# Patient Record
Sex: MALE | Race: WHITE | NOT HISPANIC OR LATINO | Employment: OTHER | URBAN - NONMETROPOLITAN AREA
[De-identification: names, ages, dates, MRNs, and addresses within clinical notes are randomized per-mention and may not be internally consistent; named-entity substitution may affect disease eponyms.]

---

## 2017-07-25 ENCOUNTER — OFFICE VISIT (OUTPATIENT)
Dept: CARDIOLOGY | Facility: CLINIC | Age: 62
End: 2017-07-25

## 2017-07-25 VITALS
SYSTOLIC BLOOD PRESSURE: 114 MMHG | BODY MASS INDEX: 33.89 KG/M2 | HEART RATE: 80 BPM | DIASTOLIC BLOOD PRESSURE: 74 MMHG | WEIGHT: 250.2 LBS | HEIGHT: 72 IN | OXYGEN SATURATION: 94 %

## 2017-07-25 DIAGNOSIS — I10 ESSENTIAL HYPERTENSION: ICD-10-CM

## 2017-07-25 DIAGNOSIS — I25.10 CORONARY ARTERY DISEASE INVOLVING NATIVE CORONARY ARTERY OF NATIVE HEART WITHOUT ANGINA PECTORIS: Primary | ICD-10-CM

## 2017-07-25 DIAGNOSIS — Z72.0 TOBACCO USE: ICD-10-CM

## 2017-07-25 PROCEDURE — 99213 OFFICE O/P EST LOW 20 MIN: CPT | Performed by: INTERNAL MEDICINE

## 2017-07-25 NOTE — PROGRESS NOTES
subjective     Chief Complaint   Patient presents with   • Follow-up   • Coronary Artery Disease   • Hypertension   • Hyperlipidemia     History of Present Illness  Patient has not been seen in quite a while.  Because of some family emergency at 2 minutes appointments.  Patient states that he is doing very well.  He denies any chest pain palpitations or shortness of breath.    Coronary artery disease  Patient had inferior wall myocardial infarction in 2013 requiring bare metal stent to RCA followed by 2 drug-eluting stents in the LAD and balloon angioplasty of the medial trunk of the LAD diagonal in 2014.  Patient is currently doing very well he denies any chest pain palpitations or shortness of breath is fairly active and is doing well from cardiac standpoint.  Recently he was planning to have surgery on the ganglion on cyst on his wrist and was wanting to know if he could hold off Plavix and aspirin for the surgery.  However he apparently had a TIA 3 weeks ago went to the emergency room and was told that he has had a mini stroke.  Patient is planning to see a neurologist to see his opinion if he could stop the medications.     HYPERTENSION  Jovanni Richter has long-standing essential hypertension for years.  he is taking medications regularly.  There are no medication side effects.  Blood pressure is very well controlled.  There has been no headache nausea chest pain.  There has been no syncopal or presyncopal episode.  he denies episodes of hypo-tension or accelerated hypertension.     Hyperlipidemia  Patient is taking Lipitor 40 mg daily.  Tolerating medications very well.  His lab work with Dr. Espinal apparently have been normal.  He has no side effects.     Unfortunately he still smokes with having known coronary artery disease and having recent TIA.  Patient is also diabetic.  Smoking cessation was discussed  Past Surgical History:   Procedure Laterality Date   • CARDIAC CATHETERIZATION  03/03/2014   •  CARDIOVASCULAR STRESS TEST  04/04/2013   • CATARACT EXTRACTION     • CEREBRAL ANEURYSM REPAIR     • CORONARY ANGIOPLASTY WITH STENT PLACEMENT     • ECHO - CONVERTED  02/02/2014   • INNER EAR SURGERY       Family History   Problem Relation Age of Onset   • Osteoporosis Mother    • Cancer Mother      breast and lung   • Cancer Father      small cell cancer, kidney   • Heart disease Father    • Diabetes Father    • No Known Problems Sister    • Aneurysm Brother      Past Medical History:   Diagnosis Date   • Claustrophobia    • COPD (chronic obstructive pulmonary disease)    • Diabetes    • Fracture, finger    • High cholesterol    • Hypertension    • MI (myocardial infarction)    • Prostate disease    • Stroke     X3 last one 9/2016   • TIA (transient ischemic attack)      Patient Active Problem List   Diagnosis   • Left wrist pain   • Diabetes   • Prostate disease   • COPD (chronic obstructive pulmonary disease)   • History of TIA (transient ischemic attack)   • Ganglion cyst of wrist   • Benign fibroma of prostate   • Claustrophobia   • Blood in the urine   • Incomplete bladder emptying   • Coronary artery disease, inferior wall myocardial infarction 2013, bare metal stent to RCA, drug-eluting stent ×2 to LAD and balloon angioplasty of the medial trunk of the LAD diagonal 2014   • Hypertension   • Hyperlipidemia   • Tobacco use       Social History   Substance Use Topics   • Smoking status: Current Every Day Smoker     Packs/day: 1.00   • Smokeless tobacco: Never Used   • Alcohol use No       No Known Allergies    Current Outpatient Prescriptions on File Prior to Visit   Medication Sig   • atenolol (TENORMIN) 25 MG tablet Take 25 mg by mouth daily.   • atorvastatin (LIPITOR) 40 MG tablet Take 40 mg by mouth daily.   • clopidogrel (PLAVIX) 75 MG tablet Take 75 mg by mouth daily.   • doxazosin (CARDURA) 8 MG tablet Take 8 mg by mouth every night.   • gabapentin (NEURONTIN) 400 MG capsule Take 400 mg by mouth 2 (two)  "times a day.   • glimepiride (AMARYL) 4 MG tablet Take 4 mg by mouth daily.   • isosorbide mononitrate (IMDUR) 30 MG 24 hr tablet Take 30 mg by mouth daily.   • losartan (COZAAR) 100 MG tablet Take 1 tablet by mouth daily.   • metFORMIN (GLUCOPHAGE) 500 MG tablet Take 500 mg by mouth. 500 mg am  1000 mg qhs   • pantoprazole (PROTONIX) 40 MG EC tablet Take 40 mg by mouth daily.   • Venlafaxine HCl (EFFEXOR PO) Take  by mouth.   • zolpidem (AMBIEN) 10 MG tablet Take 10 mg by mouth at night as needed.     No current facility-administered medications on file prior to visit.          The following portions of the patient's history were reviewed and updated as appropriate: allergies, current medications, past family history, past medical history, past social history, past surgical history and problem list.    Review of Systems   Constitution: Negative.   HENT: Negative.  Negative for congestion and headaches.    Eyes: Negative.    Cardiovascular: Negative.  Negative for chest pain, cyanosis, dyspnea on exertion, irregular heartbeat, leg swelling, near-syncope, orthopnea, palpitations, paroxysmal nocturnal dyspnea and syncope.   Respiratory: Negative.  Negative for shortness of breath.    Hematologic/Lymphatic: Negative.    Musculoskeletal: Negative.    Gastrointestinal: Negative.    Neurological: Negative.           Objective:     /74 (BP Location: Left arm, Patient Position: Sitting)  Pulse 80  Ht 72\" (182.9 cm)  Wt 250 lb 3.2 oz (113 kg)  SpO2 94%  BMI 33.93 kg/m2  Physical Exam   Constitutional: He appears well-developed and well-nourished.   HENT:   Head: Normocephalic and atraumatic.   Mouth/Throat: Oropharynx is clear and moist.   Eyes: Conjunctivae and EOM are normal. Pupils are equal, round, and reactive to light. No scleral icterus.   Neck: Normal range of motion. Neck supple. No JVD present. No tracheal deviation present. No thyromegaly present.   Cardiovascular: Normal rate, regular rhythm, normal " heart sounds and intact distal pulses.  Exam reveals no friction rub.    No murmur heard.  Pulmonary/Chest: Effort normal and breath sounds normal. No respiratory distress. He has no wheezes. He has no rales. He exhibits no tenderness.   Abdominal: Soft. Bowel sounds are normal. He exhibits no distension and no mass. There is no tenderness. There is no rebound and no guarding.   Musculoskeletal: Normal range of motion. He exhibits no edema, tenderness or deformity.   Lymphadenopathy:     He has no cervical adenopathy.   Neurological: He is alert. He has normal reflexes. No cranial nerve deficit. He exhibits normal muscle tone. Coordination normal.   Skin: Skin is warm and dry.   Psychiatric: He has a normal mood and affect. His behavior is normal. Judgment and thought content normal.         Lab Review  No lab report available today but he had lab work done by Dr. Espinal today.  We will get a copy for our records    Procedures       I personally viewed and interpreted the patient's LAB data         Assessment:     1. Coronary artery disease involving native coronary artery of native heart without angina pectoris    2. Essential hypertension    3. Tobacco use          Plan:      Patient is doing very well from cardiac standpoint he has had myocardial infarction in 2013 and has had the multiple stents.  Patient is quite active and is totally asymptomatic.  Current management will be continued.  Aggressive risk factor modification discussed.    Blood pressure seems been very well controlled  Unfortunately patient continues to smoke.  Cessation of smoking was again urged.    He also has diabetes mellitus and hyperlipidemia he is following very closely with Dr. Espinal.  He had lab work done today reported is not available.    Follow-up scheduled.  No change in therapy at this time.            Return in about 6 months (around 1/25/2018).

## 2017-11-13 ENCOUNTER — APPOINTMENT (OUTPATIENT)
Dept: CT IMAGING | Facility: HOSPITAL | Age: 62
End: 2017-11-13

## 2017-11-13 ENCOUNTER — OFFICE VISIT (OUTPATIENT)
Dept: UROLOGY | Facility: CLINIC | Age: 62
End: 2017-11-13

## 2017-11-13 ENCOUNTER — HOSPITAL ENCOUNTER (INPATIENT)
Facility: HOSPITAL | Age: 62
LOS: 2 days | Discharge: HOME | End: 2017-11-16
Attending: FAMILY MEDICINE | Admitting: HOSPITALIST

## 2017-11-13 DIAGNOSIS — R33.9 URINARY RETENTION: ICD-10-CM

## 2017-11-13 DIAGNOSIS — N40.1 BENIGN PROSTATIC HYPERPLASIA (BPH) WITH STRAINING ON URINATION: ICD-10-CM

## 2017-11-13 DIAGNOSIS — E11.9 TYPE 2 DIABETES MELLITUS TREATED WITHOUT INSULIN (HCC): ICD-10-CM

## 2017-11-13 DIAGNOSIS — R31.0 GROSS HEMATURIA: Primary | ICD-10-CM

## 2017-11-13 DIAGNOSIS — R39.16 BENIGN PROSTATIC HYPERPLASIA (BPH) WITH STRAINING ON URINATION: ICD-10-CM

## 2017-11-13 LAB
ALBUMIN SERPL-MCNC: 4.6 G/DL (ref 3.4–4.8)
ALBUMIN/GLOB SERPL: 1.4 G/DL (ref 1.5–2.5)
ALP SERPL-CCNC: 89 U/L (ref 40–129)
ALT SERPL W P-5'-P-CCNC: 30 U/L (ref 10–44)
ANION GAP SERPL CALCULATED.3IONS-SCNC: 12.6 MMOL/L (ref 3.6–11.2)
APTT PPP: 26.1 SECONDS (ref 23.8–36.1)
AST SERPL-CCNC: 30 U/L (ref 10–34)
BASOPHILS # BLD AUTO: 0.04 10*3/MM3 (ref 0–0.3)
BASOPHILS NFR BLD AUTO: 0.2 % (ref 0–2)
BILIRUB SERPL-MCNC: 0.4 MG/DL (ref 0.2–1.8)
BUN BLD-MCNC: 13 MG/DL (ref 7–21)
BUN/CREAT SERPL: 10 (ref 7–25)
CALCIUM SPEC-SCNC: 9.5 MG/DL (ref 7.7–10)
CHLORIDE SERPL-SCNC: 102 MMOL/L (ref 99–112)
CO2 SERPL-SCNC: 19.4 MMOL/L (ref 24.3–31.9)
CREAT BLD-MCNC: 1.3 MG/DL (ref 0.43–1.29)
D-LACTATE SERPL-SCNC: 5.2 MMOL/L (ref 0.5–2)
DEPRECATED RDW RBC AUTO: 50.3 FL (ref 37–54)
EOSINOPHIL # BLD AUTO: 0.02 10*3/MM3 (ref 0–0.7)
EOSINOPHIL NFR BLD AUTO: 0.1 % (ref 0–5)
ERYTHROCYTE [DISTWIDTH] IN BLOOD BY AUTOMATED COUNT: 15.6 % (ref 11.5–14.5)
GFR SERPL CREATININE-BSD FRML MDRD: 56 ML/MIN/1.73
GLOBULIN UR ELPH-MCNC: 3.4 GM/DL
GLUCOSE BLD-MCNC: 408 MG/DL (ref 70–110)
HCT VFR BLD AUTO: 38.6 % (ref 42–52)
HGB BLD-MCNC: 13 G/DL (ref 14–18)
IMM GRANULOCYTES # BLD: 0.1 10*3/MM3 (ref 0–0.03)
IMM GRANULOCYTES NFR BLD: 0.4 % (ref 0–0.5)
INR PPP: 1.02 (ref 0.9–1.1)
LYMPHOCYTES # BLD AUTO: 3.01 10*3/MM3 (ref 1–3)
LYMPHOCYTES NFR BLD AUTO: 12.3 % (ref 21–51)
MCH RBC QN AUTO: 30.1 PG (ref 27–33)
MCHC RBC AUTO-ENTMCNC: 33.7 G/DL (ref 33–37)
MCV RBC AUTO: 89.4 FL (ref 80–94)
MONOCYTES # BLD AUTO: 1.85 10*3/MM3 (ref 0.1–0.9)
MONOCYTES NFR BLD AUTO: 7.6 % (ref 0–10)
NEUTROPHILS # BLD AUTO: 19.44 10*3/MM3 (ref 1.4–6.5)
NEUTROPHILS NFR BLD AUTO: 79.4 % (ref 30–70)
OSMOLALITY SERPL CALC.SUM OF ELEC: 285.5 MOSM/KG (ref 273–305)
PLATELET # BLD AUTO: 266 10*3/MM3 (ref 130–400)
PMV BLD AUTO: 11.1 FL (ref 6–10)
POTASSIUM BLD-SCNC: 4.5 MMOL/L (ref 3.5–5.3)
PROT SERPL-MCNC: 8 G/DL (ref 6–8)
PROTHROMBIN TIME: 13.5 SECONDS (ref 11–15.4)
RBC # BLD AUTO: 4.32 10*6/MM3 (ref 4.7–6.1)
SODIUM BLD-SCNC: 134 MMOL/L (ref 135–153)
WBC NRBC COR # BLD: 24.46 10*3/MM3 (ref 4.5–12.5)

## 2017-11-13 PROCEDURE — 74176 CT ABD & PELVIS W/O CONTRAST: CPT | Performed by: RADIOLOGY

## 2017-11-13 PROCEDURE — 25010000002 HYDROMORPHONE PER 4 MG

## 2017-11-13 PROCEDURE — 87040 BLOOD CULTURE FOR BACTERIA: CPT | Performed by: FAMILY MEDICINE

## 2017-11-13 PROCEDURE — 25010000002 ONDANSETRON PER 1 MG

## 2017-11-13 PROCEDURE — 85610 PROTHROMBIN TIME: CPT | Performed by: FAMILY MEDICINE

## 2017-11-13 PROCEDURE — 80053 COMPREHEN METABOLIC PANEL: CPT | Performed by: FAMILY MEDICINE

## 2017-11-13 PROCEDURE — 84484 ASSAY OF TROPONIN QUANT: CPT | Performed by: HOSPITALIST

## 2017-11-13 PROCEDURE — 82553 CREATINE MB FRACTION: CPT | Performed by: HOSPITALIST

## 2017-11-13 PROCEDURE — 85025 COMPLETE CBC W/AUTO DIFF WBC: CPT | Performed by: FAMILY MEDICINE

## 2017-11-13 PROCEDURE — 36415 COLL VENOUS BLD VENIPUNCTURE: CPT

## 2017-11-13 PROCEDURE — 82550 ASSAY OF CK (CPK): CPT | Performed by: HOSPITALIST

## 2017-11-13 PROCEDURE — 74176 CT ABD & PELVIS W/O CONTRAST: CPT

## 2017-11-13 PROCEDURE — 85730 THROMBOPLASTIN TIME PARTIAL: CPT | Performed by: FAMILY MEDICINE

## 2017-11-13 PROCEDURE — 83874 ASSAY OF MYOGLOBIN: CPT | Performed by: HOSPITALIST

## 2017-11-13 PROCEDURE — 51703 INSERT BLADDER CATH COMPLEX: CPT | Performed by: UROLOGY

## 2017-11-13 PROCEDURE — 99204 OFFICE O/P NEW MOD 45 MIN: CPT | Performed by: UROLOGY

## 2017-11-13 PROCEDURE — 83036 HEMOGLOBIN GLYCOSYLATED A1C: CPT | Performed by: HOSPITALIST

## 2017-11-13 PROCEDURE — 99285 EMERGENCY DEPT VISIT HI MDM: CPT

## 2017-11-13 PROCEDURE — 83605 ASSAY OF LACTIC ACID: CPT | Performed by: FAMILY MEDICINE

## 2017-11-13 PROCEDURE — 99284 EMERGENCY DEPT VISIT MOD MDM: CPT

## 2017-11-13 RX ORDER — HYDROMORPHONE HCL 110MG/55ML
PATIENT CONTROLLED ANALGESIA SYRINGE INTRAVENOUS
Status: COMPLETED
Start: 2017-11-13 | End: 2017-11-13

## 2017-11-13 RX ORDER — OXYCODONE AND ACETAMINOPHEN 10; 325 MG/1; MG/1
1 TABLET ORAL ONCE
Status: COMPLETED | OUTPATIENT
Start: 2017-11-13 | End: 2017-11-13

## 2017-11-13 RX ORDER — ONDANSETRON 2 MG/ML
4 INJECTION INTRAMUSCULAR; INTRAVENOUS EVERY 6 HOURS PRN
Status: DISCONTINUED | OUTPATIENT
Start: 2017-11-13 | End: 2017-11-16 | Stop reason: HOSPADM

## 2017-11-13 RX ORDER — ONDANSETRON 2 MG/ML
INJECTION INTRAMUSCULAR; INTRAVENOUS
Status: COMPLETED
Start: 2017-11-13 | End: 2017-11-13

## 2017-11-13 RX ORDER — SODIUM CHLORIDE 0.9 % (FLUSH) 0.9 %
10 SYRINGE (ML) INJECTION AS NEEDED
Status: DISCONTINUED | OUTPATIENT
Start: 2017-11-13 | End: 2017-11-16 | Stop reason: HOSPADM

## 2017-11-13 RX ORDER — MAGNESIUM HYDROXIDE 1200 MG/15ML
LIQUID ORAL
Status: COMPLETED
Start: 2017-11-13 | End: 2017-11-13

## 2017-11-13 RX ADMIN — WATER 1000 ML: 100 IRRIGANT IRRIGATION at 23:00

## 2017-11-13 RX ADMIN — ONDANSETRON 4 MG: 2 INJECTION INTRAMUSCULAR; INTRAVENOUS at 23:22

## 2017-11-13 RX ADMIN — OXYCODONE HYDROCHLORIDE AND ACETAMINOPHEN 1 TABLET: 10; 325 TABLET ORAL at 21:20

## 2017-11-13 RX ADMIN — ONDANSETRON 4 MG: 2 INJECTION, SOLUTION INTRAMUSCULAR; INTRAVENOUS at 23:22

## 2017-11-13 RX ADMIN — SODIUM CHLORIDE 3450 ML: 9 INJECTION, SOLUTION INTRAVENOUS at 23:20

## 2017-11-13 RX ADMIN — HYDROMORPHONE HYDROCHLORIDE 2 MG: 2 INJECTION INTRAMUSCULAR; INTRAVENOUS; SUBCUTANEOUS at 23:23

## 2017-11-13 NOTE — PROGRESS NOTES
Chief Complaint:          Chief Complaint   Patient presents with   • Blood in Urine       HPI:   62 y.o. male.  62-year-old white male previously a patient here and saw Ginna in 2014 with BPH placed on doxazosin.  Apparently on Sunday started with significant pain and strangury as well as gross hematuria presents today clot retention having significant pain.  No fevers or chills.  He is in significant pain he is on Plavix for heart disease and stents.  I went ahead and anchored a catheter to gravity drainage and irrigated 5 L of fluid with a large amount of clot and cessation of the blood I told him to see the prostate bleeding or bladder tumor.  He has no flank pain or upper tract symptomatology whatsoever.  I left a 22 [place] catheter gravity drainage its clear him gentamicin and have a CBC and a BMP pending at the time of this dictation.          Past Medical History:        Past Medical History:   Diagnosis Date   • Claustrophobia    • COPD (chronic obstructive pulmonary disease)    • Diabetes    • Fracture, finger    • High cholesterol    • Hypertension    • MI (myocardial infarction)    • Prostate disease    • Stroke     X3 last one 9/2016   • TIA (transient ischemic attack)          Current Meds:     Current Outpatient Prescriptions   Medication Sig Dispense Refill   • atenolol (TENORMIN) 25 MG tablet Take 25 mg by mouth daily.     • atorvastatin (LIPITOR) 40 MG tablet Take 40 mg by mouth daily.     • clopidogrel (PLAVIX) 75 MG tablet Take 75 mg by mouth daily.     • doxazosin (CARDURA) 8 MG tablet Take 8 mg by mouth every night.     • gabapentin (NEURONTIN) 400 MG capsule Take 400 mg by mouth 2 (two) times a day.     • glimepiride (AMARYL) 4 MG tablet Take 4 mg by mouth daily.     • isosorbide mononitrate (IMDUR) 30 MG 24 hr tablet Take 30 mg by mouth daily.     • losartan (COZAAR) 100 MG tablet Take 1 tablet by mouth daily. 30 tablet 2   • metFORMIN (GLUCOPHAGE) 500 MG tablet Take 500 mg by mouth. 500 mg  am  1000 mg qhs     • pantoprazole (PROTONIX) 40 MG EC tablet Take 40 mg by mouth daily.     • Venlafaxine HCl (EFFEXOR PO) Take  by mouth.     • zolpidem (AMBIEN) 10 MG tablet Take 10 mg by mouth at night as needed.       No current facility-administered medications for this visit.         Allergies:      No Known Allergies     Past Surgical History:     Past Surgical History:   Procedure Laterality Date   • CARDIAC CATHETERIZATION  03/03/2014   • CARDIOVASCULAR STRESS TEST  04/04/2013   • CATARACT EXTRACTION     • CEREBRAL ANEURYSM REPAIR     • CORONARY ANGIOPLASTY WITH STENT PLACEMENT     • ECHO - CONVERTED  02/02/2014   • INNER EAR SURGERY           Social History:     Social History     Social History   • Marital status: Single     Spouse name: N/A   • Number of children: N/A   • Years of education: N/A     Occupational History   • Not on file.     Social History Main Topics   • Smoking status: Current Every Day Smoker     Packs/day: 1.00   • Smokeless tobacco: Never Used   • Alcohol use No   • Drug use: No   • Sexual activity: Not on file     Other Topics Concern   • Not on file     Social History Narrative       Family History:     Family History   Problem Relation Age of Onset   • Osteoporosis Mother    • Cancer Mother      breast and lung   • Cancer Father      small cell cancer, kidney   • Heart disease Father    • Diabetes Father    • No Known Problems Sister    • Aneurysm Brother        Review of Systems:     Review of Systems   Constitutional: Negative.    HENT: Negative.    Eyes: Negative.    Respiratory: Negative.    Cardiovascular: Negative.    Gastrointestinal: Negative.    Endocrine: Negative.    Genitourinary: Positive for difficulty urinating, hematuria and penile pain.   Musculoskeletal: Negative.    Allergic/Immunologic: Negative.    Neurological: Negative.    Hematological: Negative.    Psychiatric/Behavioral: Negative.        Physical Exam:     Physical Exam   Constitutional: He is oriented  to person, place, and time. He appears well-developed and well-nourished.   HENT:   Head: Normocephalic and atraumatic.   Eyes: Conjunctivae and EOM are normal. Pupils are equal, round, and reactive to light.   Neck: Normal range of motion.   Cardiovascular: Normal rate, regular rhythm, normal heart sounds and intact distal pulses.    Pulmonary/Chest: Effort normal and breath sounds normal.   Abdominal: Soft. Bowel sounds are normal.   Genitourinary: Penis normal.   Genitourinary Comments: Obese white male in obvious discomfort with a palpable bladder I went ahead negative catheter drained a large amount of urine and blood clot he circumcised he has normal testes   Musculoskeletal: Normal range of motion.   Neurological: He is alert and oriented to person, place, and time. He has normal reflexes.   Skin: Skin is warm and dry.   Psychiatric: He has a normal mood and affect. His behavior is normal. Judgment and thought content normal.   Nursing note and vitals reviewed.      Procedure:   Bladder clot irrigation extensive: After prep and drape in a sterile fashion I anesthetized the urethra with 10 cc of 2% viscous Xylocaine jelly I anchored a 22 [place] catheter and irrigated extensively with 5 L of sterile water to the urine was clear and leave the catheter and I gave him gentamicin as prophylaxis.    Assessment:   No diagnosis found.  No orders of the defined types were placed in this encounter.      Plan:   Urinary clot retention-secondary to gross hematuria catheter placed no active bleeding at this time  Gross painless hematuria-Hematuria-patient was diagnosed with hematuria.  We discussed the significance of microscopic hematuria versus gross hematuria.  We discussed the presence or absence of the type of clotting identified including vermiform clots consistent with ureteral bleeding versus just pink tinged urine versus marcy clots.  We discussed the presence of urokinase in the urine which causes the clots to  dissolve with time.  We discussed the fact that it takes only a very small amount of blood in the urine to make the urine very red appearing and therefore give one the impression that there is much more blood loss that is really present.  He discussed the use of both an upper and lower tract investigation.  I discussed the fact that an upper tract investigation includes a normal renal ultrasound with a significant risks of missing more subtle lesions.  Progressing to a CT scan without contrast and finally the CT scan with contrast being the gold standard to diagnose the small neoplasms.  We discussed the lower tract investigation consisting of a cystoscopy in many of the cases where the upper tract study is negative.  Discussed the fact that there is about a 96% chance of a negative workup with episodes of microscopic hematuria and with much greater in the face of gross hematuria.  We discussed the fact that this is a non-cumulative test.  In other words if there is hematuria next year I would recommend continuing to work up the condition because of the fact that neoplasms may be small at the first workup and easily are missed.  I discussed the differential diagnosis of hematuria including trauma, neoplasia, infection, etc.  We discussed the fact that if there is any history of chronic kidney disease or risk factors such as diabetes for contrast a noncontrasted study will be utilized.  We will initiate an investigation.           This document has been electronically signed by TRACE BELLO MD November 13, 2017 1:04 PM

## 2017-11-14 ENCOUNTER — APPOINTMENT (OUTPATIENT)
Dept: CT IMAGING | Facility: HOSPITAL | Age: 62
End: 2017-11-14

## 2017-11-14 LAB
6-ACETYL MORPHINE: NEGATIVE
ALBUMIN SERPL-MCNC: 3.6 G/DL (ref 3.4–4.8)
ALBUMIN/GLOB SERPL: 1.3 G/DL (ref 1.5–2.5)
ALP SERPL-CCNC: 66 U/L (ref 40–129)
ALT SERPL W P-5'-P-CCNC: 23 U/L (ref 10–44)
AMPHET+METHAMPHET UR QL: NEGATIVE
ANION GAP SERPL CALCULATED.3IONS-SCNC: 7 MMOL/L (ref 3.6–11.2)
AST SERPL-CCNC: 23 U/L (ref 10–34)
BACTERIA UR QL AUTO: ABNORMAL /HPF
BARBITURATES UR QL SCN: NEGATIVE
BASOPHILS # BLD AUTO: 0.03 10*3/MM3 (ref 0–0.3)
BASOPHILS NFR BLD AUTO: 0.2 % (ref 0–2)
BENZODIAZ UR QL SCN: NEGATIVE
BILIRUB SERPL-MCNC: 0.4 MG/DL (ref 0.2–1.8)
BILIRUB UR QL STRIP: ABNORMAL
BUN BLD-MCNC: 10 MG/DL (ref 7–21)
BUN/CREAT SERPL: 10.4 (ref 7–25)
BUPRENORPHINE SERPL-MCNC: NEGATIVE NG/ML
CALCIUM SPEC-SCNC: 8.1 MG/DL (ref 7.7–10)
CANNABINOIDS SERPL QL: NEGATIVE
CHLORIDE SERPL-SCNC: 108 MMOL/L (ref 99–112)
CK MB SERPL-CCNC: 3.2 NG/ML (ref 0–5)
CK MB SERPL-RTO: 3.5 % (ref 0–3)
CK SERPL-CCNC: 91 U/L (ref 24–204)
CLARITY UR: ABNORMAL
CO2 SERPL-SCNC: 22 MMOL/L (ref 24.3–31.9)
COCAINE UR QL: NEGATIVE
COLOR UR: ABNORMAL
CREAT BLD-MCNC: 0.96 MG/DL (ref 0.43–1.29)
CRP SERPL-MCNC: 0.75 MG/DL (ref 0–0.99)
CRP SERPL-MCNC: 1.33 MG/DL (ref 0–0.99)
D-LACTATE SERPL-SCNC: 1.7 MMOL/L (ref 0.5–2)
D-LACTATE SERPL-SCNC: 3.2 MMOL/L (ref 0.5–2)
DEPRECATED RDW RBC AUTO: 52.1 FL (ref 37–54)
EOSINOPHIL # BLD AUTO: 0.02 10*3/MM3 (ref 0–0.7)
EOSINOPHIL NFR BLD AUTO: 0.1 % (ref 0–5)
ERYTHROCYTE [DISTWIDTH] IN BLOOD BY AUTOMATED COUNT: 15.8 % (ref 11.5–14.5)
GFR SERPL CREATININE-BSD FRML MDRD: 79 ML/MIN/1.73
GLOBULIN UR ELPH-MCNC: 2.8 GM/DL
GLUCOSE BLD-MCNC: 235 MG/DL (ref 70–110)
GLUCOSE BLDC GLUCOMTR-MCNC: 181 MG/DL (ref 70–130)
GLUCOSE BLDC GLUCOMTR-MCNC: 219 MG/DL (ref 70–130)
GLUCOSE BLDC GLUCOMTR-MCNC: 231 MG/DL (ref 70–130)
GLUCOSE BLDC GLUCOMTR-MCNC: 242 MG/DL (ref 70–130)
GLUCOSE BLDC GLUCOMTR-MCNC: 251 MG/DL (ref 70–130)
GLUCOSE BLDC GLUCOMTR-MCNC: 332 MG/DL (ref 70–130)
GLUCOSE UR STRIP-MCNC: ABNORMAL MG/DL
HBA1C MFR BLD: 9.2 % (ref 4.5–5.7)
HBA1C MFR BLD: 9.4 % (ref 4.5–5.7)
HCT VFR BLD AUTO: 30.8 % (ref 42–52)
HGB BLD-MCNC: 10.3 G/DL (ref 14–18)
HGB UR QL STRIP.AUTO: ABNORMAL
HOLD SPECIMEN: NORMAL
HYALINE CASTS UR QL AUTO: ABNORMAL /LPF
IMM GRANULOCYTES # BLD: 0.04 10*3/MM3 (ref 0–0.03)
IMM GRANULOCYTES NFR BLD: 0.2 % (ref 0–0.5)
KETONES UR QL STRIP: NEGATIVE
LEUKOCYTE ESTERASE UR QL STRIP.AUTO: ABNORMAL
LYMPHOCYTES # BLD AUTO: 3.54 10*3/MM3 (ref 1–3)
LYMPHOCYTES NFR BLD AUTO: 19.9 % (ref 21–51)
MCH RBC QN AUTO: 29.9 PG (ref 27–33)
MCHC RBC AUTO-ENTMCNC: 33.4 G/DL (ref 33–37)
MCV RBC AUTO: 89.5 FL (ref 80–94)
METHADONE UR QL SCN: NEGATIVE
MONOCYTES # BLD AUTO: 2.15 10*3/MM3 (ref 0.1–0.9)
MONOCYTES NFR BLD AUTO: 12.1 % (ref 0–10)
MYOGLOBIN SERPL-MCNC: 53 NG/ML (ref 0–109)
NEUTROPHILS # BLD AUTO: 12.03 10*3/MM3 (ref 1.4–6.5)
NEUTROPHILS NFR BLD AUTO: 67.5 % (ref 30–70)
NITRITE UR QL STRIP: POSITIVE
OPIATES UR QL: POSITIVE
OSMOLALITY SERPL CALC.SUM OF ELEC: 280.4 MOSM/KG (ref 273–305)
OXYCODONE UR QL SCN: POSITIVE
PCP UR QL SCN: NEGATIVE
PH UR STRIP.AUTO: <=5 [PH] (ref 5–8)
PLATELET # BLD AUTO: 188 10*3/MM3 (ref 130–400)
PMV BLD AUTO: 11 FL (ref 6–10)
POTASSIUM BLD-SCNC: 4.3 MMOL/L (ref 3.5–5.3)
PROT SERPL-MCNC: 6.4 G/DL (ref 6–8)
PROT UR QL STRIP: ABNORMAL
RBC # BLD AUTO: 3.44 10*6/MM3 (ref 4.7–6.1)
RBC # UR: ABNORMAL /HPF
REF LAB TEST METHOD: ABNORMAL
SODIUM BLD-SCNC: 137 MMOL/L (ref 135–153)
SP GR UR STRIP: 1.02 (ref 1–1.03)
SQUAMOUS #/AREA URNS HPF: ABNORMAL /HPF
TROPONIN I SERPL-MCNC: 0.03 NG/ML
UROBILINOGEN UR QL STRIP: ABNORMAL
WBC NRBC COR # BLD: 17.81 10*3/MM3 (ref 4.5–12.5)
WBC UR QL AUTO: ABNORMAL /HPF

## 2017-11-14 PROCEDURE — 25010000002 ONDANSETRON PER 1 MG: Performed by: FAMILY MEDICINE

## 2017-11-14 PROCEDURE — 80307 DRUG TEST PRSMV CHEM ANLYZR: CPT | Performed by: HOSPITALIST

## 2017-11-14 PROCEDURE — 0 IOPAMIDOL 61 % SOLUTION: Performed by: FAMILY MEDICINE

## 2017-11-14 PROCEDURE — 99222 1ST HOSP IP/OBS MODERATE 55: CPT | Performed by: UROLOGY

## 2017-11-14 PROCEDURE — 25010000002 HYDROMORPHONE PER 4 MG

## 2017-11-14 PROCEDURE — 25010000002 HYDROMORPHONE PER 4 MG: Performed by: HOSPITALIST

## 2017-11-14 PROCEDURE — 99223 1ST HOSP IP/OBS HIGH 75: CPT | Performed by: HOSPITALIST

## 2017-11-14 PROCEDURE — 94799 UNLISTED PULMONARY SVC/PX: CPT

## 2017-11-14 PROCEDURE — 82962 GLUCOSE BLOOD TEST: CPT

## 2017-11-14 PROCEDURE — 74177 CT ABD & PELVIS W/CONTRAST: CPT | Performed by: RADIOLOGY

## 2017-11-14 PROCEDURE — 93010 ELECTROCARDIOGRAM REPORT: CPT | Performed by: INTERNAL MEDICINE

## 2017-11-14 PROCEDURE — 81001 URINALYSIS AUTO W/SCOPE: CPT | Performed by: HOSPITALIST

## 2017-11-14 PROCEDURE — 25010000002 PIPERACILLIN-TAZOBACTAM: Performed by: FAMILY MEDICINE

## 2017-11-14 PROCEDURE — 86140 C-REACTIVE PROTEIN: CPT | Performed by: HOSPITALIST

## 2017-11-14 PROCEDURE — 87086 URINE CULTURE/COLONY COUNT: CPT | Performed by: HOSPITALIST

## 2017-11-14 PROCEDURE — 85025 COMPLETE CBC W/AUTO DIFF WBC: CPT | Performed by: HOSPITALIST

## 2017-11-14 PROCEDURE — 93005 ELECTROCARDIOGRAM TRACING: CPT | Performed by: FAMILY MEDICINE

## 2017-11-14 PROCEDURE — 74177 CT ABD & PELVIS W/CONTRAST: CPT

## 2017-11-14 PROCEDURE — 63710000001 INSULIN REGULAR HUMAN PER 5 UNITS: Performed by: FAMILY MEDICINE

## 2017-11-14 PROCEDURE — 80053 COMPREHEN METABOLIC PANEL: CPT | Performed by: HOSPITALIST

## 2017-11-14 PROCEDURE — 63710000001 INSULIN DETEMIR PER 5 UNITS: Performed by: INTERNAL MEDICINE

## 2017-11-14 PROCEDURE — 83605 ASSAY OF LACTIC ACID: CPT | Performed by: HOSPITALIST

## 2017-11-14 PROCEDURE — 83036 HEMOGLOBIN GLYCOSYLATED A1C: CPT | Performed by: UROLOGY

## 2017-11-14 PROCEDURE — 83605 ASSAY OF LACTIC ACID: CPT | Performed by: FAMILY MEDICINE

## 2017-11-14 PROCEDURE — 63710000001 INSULIN ASPART PER 5 UNITS: Performed by: HOSPITALIST

## 2017-11-14 RX ORDER — NITROGLYCERIN 0.4 MG/1
0.4 TABLET SUBLINGUAL
Status: DISCONTINUED | OUTPATIENT
Start: 2017-11-14 | End: 2017-11-16 | Stop reason: HOSPADM

## 2017-11-14 RX ORDER — L.ACID,CASEI/B.ANIMAL/S.THERMO 16B CELL
1 CAPSULE ORAL DAILY
Status: DISCONTINUED | OUTPATIENT
Start: 2017-11-14 | End: 2017-11-16 | Stop reason: HOSPADM

## 2017-11-14 RX ORDER — ZOLPIDEM TARTRATE 5 MG/1
10 TABLET ORAL NIGHTLY PRN
Status: DISCONTINUED | OUTPATIENT
Start: 2017-11-14 | End: 2017-11-16 | Stop reason: HOSPADM

## 2017-11-14 RX ORDER — PANTOPRAZOLE SODIUM 40 MG/1
40 TABLET, DELAYED RELEASE ORAL DAILY
Status: DISCONTINUED | OUTPATIENT
Start: 2017-11-14 | End: 2017-11-16 | Stop reason: HOSPADM

## 2017-11-14 RX ORDER — NICOTINE POLACRILEX 4 MG
15 LOZENGE BUCCAL
Status: DISCONTINUED | OUTPATIENT
Start: 2017-11-14 | End: 2017-11-16 | Stop reason: HOSPADM

## 2017-11-14 RX ORDER — HYDROMORPHONE HCL 110MG/55ML
PATIENT CONTROLLED ANALGESIA SYRINGE INTRAVENOUS
Status: COMPLETED
Start: 2017-11-14 | End: 2017-11-14

## 2017-11-14 RX ORDER — ZOLPIDEM TARTRATE 5 MG/1
10 TABLET ORAL NIGHTLY PRN
Status: CANCELLED | OUTPATIENT
Start: 2017-11-14

## 2017-11-14 RX ORDER — HYDROMORPHONE HYDROCHLORIDE 1 MG/ML
0.5 INJECTION, SOLUTION INTRAMUSCULAR; INTRAVENOUS; SUBCUTANEOUS EVERY 4 HOURS PRN
Status: DISCONTINUED | OUTPATIENT
Start: 2017-11-14 | End: 2017-11-15

## 2017-11-14 RX ORDER — MAGNESIUM HYDROXIDE 1200 MG/15ML
LIQUID ORAL
Status: DISPENSED
Start: 2017-11-14 | End: 2017-11-14

## 2017-11-14 RX ORDER — ONDANSETRON 2 MG/ML
4 INJECTION INTRAMUSCULAR; INTRAVENOUS ONCE
Status: COMPLETED | OUTPATIENT
Start: 2017-11-14 | End: 2017-11-14

## 2017-11-14 RX ORDER — ISOSORBIDE MONONITRATE 30 MG/1
30 TABLET, EXTENDED RELEASE ORAL DAILY
Status: DISCONTINUED | OUTPATIENT
Start: 2017-11-14 | End: 2017-11-16 | Stop reason: HOSPADM

## 2017-11-14 RX ORDER — DEXTROSE MONOHYDRATE 25 G/50ML
25 INJECTION, SOLUTION INTRAVENOUS
Status: DISCONTINUED | OUTPATIENT
Start: 2017-11-14 | End: 2017-11-16 | Stop reason: HOSPADM

## 2017-11-14 RX ORDER — SODIUM CHLORIDE 9 MG/ML
INJECTION, SOLUTION INTRAVENOUS
Status: DISPENSED
Start: 2017-11-14 | End: 2017-11-14

## 2017-11-14 RX ORDER — PROMETHAZINE HYDROCHLORIDE 12.5 MG/1
6.25 TABLET ORAL EVERY 6 HOURS PRN
Status: DISCONTINUED | OUTPATIENT
Start: 2017-11-14 | End: 2017-11-16 | Stop reason: HOSPADM

## 2017-11-14 RX ORDER — SODIUM CHLORIDE 0.9 % (FLUSH) 0.9 %
1-10 SYRINGE (ML) INJECTION AS NEEDED
Status: DISCONTINUED | OUTPATIENT
Start: 2017-11-14 | End: 2017-11-16 | Stop reason: HOSPADM

## 2017-11-14 RX ORDER — ATORVASTATIN CALCIUM 40 MG/1
40 TABLET, FILM COATED ORAL DAILY
Status: CANCELLED | OUTPATIENT
Start: 2017-11-14

## 2017-11-14 RX ORDER — CLOPIDOGREL BISULFATE 75 MG/1
75 TABLET ORAL DAILY
Status: CANCELLED | OUTPATIENT
Start: 2017-11-14

## 2017-11-14 RX ORDER — FUROSEMIDE 10 MG/ML
INJECTION INTRAMUSCULAR; INTRAVENOUS
Status: DISPENSED
Start: 2017-11-14 | End: 2017-11-15

## 2017-11-14 RX ORDER — ATENOLOL 25 MG/1
25 TABLET ORAL DAILY
Status: DISCONTINUED | OUTPATIENT
Start: 2017-11-14 | End: 2017-11-16 | Stop reason: HOSPADM

## 2017-11-14 RX ORDER — TERAZOSIN 5 MG/1
10 CAPSULE ORAL NIGHTLY
Status: DISCONTINUED | OUTPATIENT
Start: 2017-11-14 | End: 2017-11-16 | Stop reason: HOSPADM

## 2017-11-14 RX ORDER — MAGNESIUM HYDROXIDE 1200 MG/15ML
3000 LIQUID ORAL CONTINUOUS
Status: DISCONTINUED | OUTPATIENT
Start: 2017-11-14 | End: 2017-11-15

## 2017-11-14 RX ORDER — SODIUM CHLORIDE 9 MG/ML
100 INJECTION, SOLUTION INTRAVENOUS CONTINUOUS
Status: DISCONTINUED | OUTPATIENT
Start: 2017-11-14 | End: 2017-11-16 | Stop reason: HOSPADM

## 2017-11-14 RX ORDER — GABAPENTIN 100 MG/1
100 CAPSULE ORAL EVERY 12 HOURS SCHEDULED
Status: DISCONTINUED | OUTPATIENT
Start: 2017-11-14 | End: 2017-11-16 | Stop reason: HOSPADM

## 2017-11-14 RX ORDER — LOSARTAN POTASSIUM 50 MG/1
100 TABLET ORAL 2 TIMES DAILY
Status: CANCELLED | OUTPATIENT
Start: 2017-11-14

## 2017-11-14 RX ORDER — GLIPIZIDE 5 MG/1
10 TABLET ORAL
Status: CANCELLED | OUTPATIENT
Start: 2017-11-14

## 2017-11-14 RX ORDER — VENLAFAXINE 100 MG/1
100 TABLET ORAL 2 TIMES DAILY
COMMUNITY
End: 2019-06-25

## 2017-11-14 RX ADMIN — SODIUM CHLORIDE 3000 ML: 900 IRRIGANT IRRIGATION at 12:15

## 2017-11-14 RX ADMIN — HYDROMORPHONE HYDROCHLORIDE 1 MG: 2 INJECTION INTRAMUSCULAR; INTRAVENOUS; SUBCUTANEOUS at 01:55

## 2017-11-14 RX ADMIN — Medication 1 CAPSULE: at 18:13

## 2017-11-14 RX ADMIN — HUMAN INSULIN 10 UNITS: 100 INJECTION, SOLUTION SUBCUTANEOUS at 00:45

## 2017-11-14 RX ADMIN — INSULIN ASPART 2 UNITS: 100 INJECTION, SOLUTION INTRAVENOUS; SUBCUTANEOUS at 18:14

## 2017-11-14 RX ADMIN — PIPERACILLIN SODIUM,TAZOBACTAM SODIUM 3.38 G: 3; .375 INJECTION, POWDER, FOR SOLUTION INTRAVENOUS at 05:17

## 2017-11-14 RX ADMIN — INSULIN ASPART 3 UNITS: 100 INJECTION, SOLUTION INTRAVENOUS; SUBCUTANEOUS at 12:14

## 2017-11-14 RX ADMIN — HYDROMORPHONE HYDROCHLORIDE 0.5 MG: 2 INJECTION INTRAMUSCULAR; INTRAVENOUS; SUBCUTANEOUS at 18:13

## 2017-11-14 RX ADMIN — SODIUM CHLORIDE 100 ML/HR: 9 INJECTION, SOLUTION INTRAVENOUS at 04:08

## 2017-11-14 RX ADMIN — ISOSORBIDE MONONITRATE 30 MG: 30 TABLET, EXTENDED RELEASE ORAL at 08:46

## 2017-11-14 RX ADMIN — GABAPENTIN 100 MG: 100 CAPSULE ORAL at 21:20

## 2017-11-14 RX ADMIN — VENLAFAXINE HYDROCHLORIDE 100 MG: 37.5 TABLET ORAL at 11:38

## 2017-11-14 RX ADMIN — SODIUM CHLORIDE 3000 ML: 900 IRRIGANT IRRIGATION at 18:10

## 2017-11-14 RX ADMIN — SODIUM CHLORIDE 3000 ML: 900 IRRIGANT IRRIGATION at 22:03

## 2017-11-14 RX ADMIN — HYDROMORPHONE HYDROCHLORIDE 0.5 MG: 1 INJECTION, SOLUTION INTRAMUSCULAR; INTRAVENOUS; SUBCUTANEOUS at 12:28

## 2017-11-14 RX ADMIN — PANTOPRAZOLE SODIUM 40 MG: 40 TABLET, DELAYED RELEASE ORAL at 08:45

## 2017-11-14 RX ADMIN — IOPAMIDOL 95 ML: 612 INJECTION, SOLUTION INTRAVENOUS at 00:46

## 2017-11-14 RX ADMIN — SODIUM CHLORIDE 3000 ML: 900 IRRIGANT IRRIGATION at 18:12

## 2017-11-14 RX ADMIN — SODIUM CHLORIDE 3000 ML: 900 IRRIGANT IRRIGATION at 17:02

## 2017-11-14 RX ADMIN — INSULIN ASPART 4 UNITS: 100 INJECTION, SOLUTION INTRAVENOUS; SUBCUTANEOUS at 21:20

## 2017-11-14 RX ADMIN — ATENOLOL 25 MG: 25 TABLET ORAL at 08:46

## 2017-11-14 RX ADMIN — TERAZOSIN HYDROCHLORIDE 10 MG: 5 CAPSULE ORAL at 21:20

## 2017-11-14 RX ADMIN — INSULIN DETEMIR 5 UNITS: 100 INJECTION, SOLUTION SUBCUTANEOUS at 21:21

## 2017-11-14 RX ADMIN — PIPERACILLIN SODIUM,TAZOBACTAM SODIUM 3.38 G: 3; .375 INJECTION, POWDER, FOR SOLUTION INTRAVENOUS at 14:32

## 2017-11-14 RX ADMIN — ONDANSETRON 4 MG: 2 INJECTION, SOLUTION INTRAMUSCULAR; INTRAVENOUS at 01:54

## 2017-11-14 RX ADMIN — VENLAFAXINE HYDROCHLORIDE 100 MG: 37.5 TABLET ORAL at 18:13

## 2017-11-14 RX ADMIN — PIPERACILLIN SODIUM AND TAZOBACTAM SODIUM 4.5 G: 4; .5 INJECTION, POWDER, FOR SOLUTION INTRAVENOUS at 00:13

## 2017-11-14 RX ADMIN — PIPERACILLIN SODIUM,TAZOBACTAM SODIUM 3.38 G: 3; .375 INJECTION, POWDER, FOR SOLUTION INTRAVENOUS at 23:49

## 2017-11-14 RX ADMIN — SODIUM CHLORIDE 3000 ML: 900 IRRIGANT IRRIGATION at 08:45

## 2017-11-14 RX ADMIN — ZOLPIDEM TARTRATE 10 MG: 5 TABLET ORAL at 22:51

## 2017-11-14 RX ADMIN — SODIUM CHLORIDE 3000 ML: 900 IRRIGANT IRRIGATION at 12:14

## 2017-11-14 RX ADMIN — GABAPENTIN 100 MG: 100 CAPSULE ORAL at 08:44

## 2017-11-15 LAB
ALBUMIN SERPL-MCNC: 3.7 G/DL (ref 3.4–4.8)
ALBUMIN/GLOB SERPL: 1.3 G/DL (ref 1.5–2.5)
ALP SERPL-CCNC: 62 U/L (ref 40–129)
ALT SERPL W P-5'-P-CCNC: 23 U/L (ref 10–44)
ANION GAP SERPL CALCULATED.3IONS-SCNC: 3.9 MMOL/L (ref 3.6–11.2)
AST SERPL-CCNC: 28 U/L (ref 10–34)
BASOPHILS # BLD AUTO: 0.04 10*3/MM3 (ref 0–0.3)
BASOPHILS NFR BLD AUTO: 0.3 % (ref 0–2)
BILIRUB SERPL-MCNC: 0.4 MG/DL (ref 0.2–1.8)
BUN BLD-MCNC: 10 MG/DL (ref 7–21)
BUN/CREAT SERPL: 10.4 (ref 7–25)
CALCIUM SPEC-SCNC: 8.3 MG/DL (ref 7.7–10)
CHLORIDE SERPL-SCNC: 107 MMOL/L (ref 99–112)
CO2 SERPL-SCNC: 25.1 MMOL/L (ref 24.3–31.9)
CREAT BLD-MCNC: 0.96 MG/DL (ref 0.43–1.29)
CRP SERPL-MCNC: 3.55 MG/DL (ref 0–0.99)
DEPRECATED RDW RBC AUTO: 52.1 FL (ref 37–54)
EOSINOPHIL # BLD AUTO: 0.24 10*3/MM3 (ref 0–0.7)
EOSINOPHIL NFR BLD AUTO: 2.1 % (ref 0–5)
ERYTHROCYTE [DISTWIDTH] IN BLOOD BY AUTOMATED COUNT: 16 % (ref 11.5–14.5)
GFR SERPL CREATININE-BSD FRML MDRD: 79 ML/MIN/1.73
GLOBULIN UR ELPH-MCNC: 2.8 GM/DL
GLUCOSE BLD-MCNC: 215 MG/DL (ref 70–110)
GLUCOSE BLDC GLUCOMTR-MCNC: 200 MG/DL (ref 70–130)
GLUCOSE BLDC GLUCOMTR-MCNC: 219 MG/DL (ref 70–130)
GLUCOSE BLDC GLUCOMTR-MCNC: 236 MG/DL (ref 70–130)
GLUCOSE BLDC GLUCOMTR-MCNC: 239 MG/DL (ref 70–130)
HCT VFR BLD AUTO: 32.5 % (ref 42–52)
HGB BLD-MCNC: 10.4 G/DL (ref 14–18)
IMM GRANULOCYTES # BLD: 0.03 10*3/MM3 (ref 0–0.03)
IMM GRANULOCYTES NFR BLD: 0.3 % (ref 0–0.5)
LYMPHOCYTES # BLD AUTO: 3.97 10*3/MM3 (ref 1–3)
LYMPHOCYTES NFR BLD AUTO: 34.7 % (ref 21–51)
MCH RBC QN AUTO: 30.1 PG (ref 27–33)
MCHC RBC AUTO-ENTMCNC: 32 G/DL (ref 33–37)
MCV RBC AUTO: 93.9 FL (ref 80–94)
MONOCYTES # BLD AUTO: 1.49 10*3/MM3 (ref 0.1–0.9)
MONOCYTES NFR BLD AUTO: 13 % (ref 0–10)
NEUTROPHILS # BLD AUTO: 5.67 10*3/MM3 (ref 1.4–6.5)
NEUTROPHILS NFR BLD AUTO: 49.6 % (ref 30–70)
OSMOLALITY SERPL CALC.SUM OF ELEC: 277.5 MOSM/KG (ref 273–305)
PLATELET # BLD AUTO: 202 10*3/MM3 (ref 130–400)
PMV BLD AUTO: 11 FL (ref 6–10)
POTASSIUM BLD-SCNC: 4.1 MMOL/L (ref 3.5–5.3)
PROT SERPL-MCNC: 6.5 G/DL (ref 6–8)
RBC # BLD AUTO: 3.46 10*6/MM3 (ref 4.7–6.1)
SODIUM BLD-SCNC: 136 MMOL/L (ref 135–153)
WBC NRBC COR # BLD: 11.44 10*3/MM3 (ref 4.5–12.5)

## 2017-11-15 PROCEDURE — 80053 COMPREHEN METABOLIC PANEL: CPT | Performed by: INTERNAL MEDICINE

## 2017-11-15 PROCEDURE — 86140 C-REACTIVE PROTEIN: CPT | Performed by: INTERNAL MEDICINE

## 2017-11-15 PROCEDURE — 94799 UNLISTED PULMONARY SVC/PX: CPT

## 2017-11-15 PROCEDURE — C1751 CATH, INF, PER/CENT/MIDLINE: HCPCS

## 2017-11-15 PROCEDURE — 63710000001 INSULIN ASPART PER 5 UNITS: Performed by: HOSPITALIST

## 2017-11-15 PROCEDURE — 82962 GLUCOSE BLOOD TEST: CPT

## 2017-11-15 PROCEDURE — 63710000001 INSULIN DETEMIR PER 5 UNITS: Performed by: INTERNAL MEDICINE

## 2017-11-15 PROCEDURE — 99233 SBSQ HOSP IP/OBS HIGH 50: CPT | Performed by: INTERNAL MEDICINE

## 2017-11-15 PROCEDURE — 85025 COMPLETE CBC W/AUTO DIFF WBC: CPT | Performed by: INTERNAL MEDICINE

## 2017-11-15 RX ORDER — SODIUM CHLORIDE 0.9 % (FLUSH) 0.9 %
10 SYRINGE (ML) INJECTION AS NEEDED
Status: DISCONTINUED | OUTPATIENT
Start: 2017-11-15 | End: 2017-11-16 | Stop reason: HOSPADM

## 2017-11-15 RX ORDER — NICOTINE 21 MG/24HR
1 PATCH, TRANSDERMAL 24 HOURS TRANSDERMAL DAILY
Status: DISCONTINUED | OUTPATIENT
Start: 2017-11-15 | End: 2017-11-16 | Stop reason: HOSPADM

## 2017-11-15 RX ORDER — FINASTERIDE 5 MG/1
5 TABLET, FILM COATED ORAL DAILY
Status: DISCONTINUED | OUTPATIENT
Start: 2017-11-15 | End: 2017-11-16 | Stop reason: HOSPADM

## 2017-11-15 RX ORDER — HYDROCODONE BITARTRATE AND ACETAMINOPHEN 5; 325 MG/1; MG/1
1 TABLET ORAL EVERY 8 HOURS PRN
Status: DISCONTINUED | OUTPATIENT
Start: 2017-11-15 | End: 2017-11-16 | Stop reason: HOSPADM

## 2017-11-15 RX ORDER — LUBIPROSTONE 24 UG/1
24 CAPSULE ORAL 2 TIMES DAILY WITH MEALS
Status: DISCONTINUED | OUTPATIENT
Start: 2017-11-15 | End: 2017-11-16 | Stop reason: HOSPADM

## 2017-11-15 RX ORDER — SODIUM CHLORIDE 0.9 % (FLUSH) 0.9 %
10 SYRINGE (ML) INJECTION EVERY 12 HOURS SCHEDULED
Status: DISCONTINUED | OUTPATIENT
Start: 2017-11-15 | End: 2017-11-16 | Stop reason: HOSPADM

## 2017-11-15 RX ADMIN — INSULIN ASPART 3 UNITS: 100 INJECTION, SOLUTION INTRAVENOUS; SUBCUTANEOUS at 20:27

## 2017-11-15 RX ADMIN — VENLAFAXINE HYDROCHLORIDE 100 MG: 37.5 TABLET ORAL at 08:49

## 2017-11-15 RX ADMIN — INSULIN ASPART 3 UNITS: 100 INJECTION, SOLUTION INTRAVENOUS; SUBCUTANEOUS at 18:39

## 2017-11-15 RX ADMIN — GABAPENTIN 100 MG: 100 CAPSULE ORAL at 08:49

## 2017-11-15 RX ADMIN — PIPERACILLIN SODIUM,TAZOBACTAM SODIUM 3.38 G: 3; .375 INJECTION, POWDER, FOR SOLUTION INTRAVENOUS at 13:30

## 2017-11-15 RX ADMIN — SODIUM CHLORIDE 3000 ML: 900 IRRIGANT IRRIGATION at 04:00

## 2017-11-15 RX ADMIN — HYDROCODONE BITARTRATE AND ACETAMINOPHEN 1 TABLET: 5; 325 TABLET ORAL at 20:27

## 2017-11-15 RX ADMIN — FINASTERIDE 5 MG: 5 TABLET, FILM COATED ORAL at 13:30

## 2017-11-15 RX ADMIN — ZOLPIDEM TARTRATE 10 MG: 5 TABLET ORAL at 20:51

## 2017-11-15 RX ADMIN — NICOTINE 1 PATCH: 21 PATCH TRANSDERMAL at 20:26

## 2017-11-15 RX ADMIN — INSULIN ASPART 3 UNITS: 100 INJECTION, SOLUTION INTRAVENOUS; SUBCUTANEOUS at 12:36

## 2017-11-15 RX ADMIN — TERAZOSIN HYDROCHLORIDE 10 MG: 5 CAPSULE ORAL at 20:27

## 2017-11-15 RX ADMIN — PIPERACILLIN SODIUM,TAZOBACTAM SODIUM 3.38 G: 3; .375 INJECTION, POWDER, FOR SOLUTION INTRAVENOUS at 20:55

## 2017-11-15 RX ADMIN — GABAPENTIN 100 MG: 100 CAPSULE ORAL at 20:27

## 2017-11-15 RX ADMIN — LUBIPROSTONE 24 MCG: 24 CAPSULE, GELATIN COATED ORAL at 18:39

## 2017-11-15 RX ADMIN — PANTOPRAZOLE SODIUM 40 MG: 40 TABLET, DELAYED RELEASE ORAL at 08:50

## 2017-11-15 RX ADMIN — Medication 1 CAPSULE: at 08:50

## 2017-11-15 RX ADMIN — INSULIN DETEMIR 10 UNITS: 100 INJECTION, SOLUTION SUBCUTANEOUS at 20:28

## 2017-11-15 RX ADMIN — SODIUM CHLORIDE 3000 ML: 900 IRRIGANT IRRIGATION at 00:00

## 2017-11-15 RX ADMIN — SODIUM CHLORIDE 3000 ML: 900 IRRIGANT IRRIGATION at 05:55

## 2017-11-15 RX ADMIN — Medication 10 ML: at 20:53

## 2017-11-15 RX ADMIN — ATENOLOL 25 MG: 25 TABLET ORAL at 08:50

## 2017-11-15 RX ADMIN — VENLAFAXINE HYDROCHLORIDE 100 MG: 37.5 TABLET ORAL at 18:39

## 2017-11-15 RX ADMIN — INSULIN ASPART 3 UNITS: 100 INJECTION, SOLUTION INTRAVENOUS; SUBCUTANEOUS at 07:59

## 2017-11-15 RX ADMIN — PIPERACILLIN SODIUM,TAZOBACTAM SODIUM 3.38 G: 3; .375 INJECTION, POWDER, FOR SOLUTION INTRAVENOUS at 05:55

## 2017-11-15 RX ADMIN — ISOSORBIDE MONONITRATE 30 MG: 30 TABLET, EXTENDED RELEASE ORAL at 08:50

## 2017-11-15 RX ADMIN — SODIUM CHLORIDE 3000 ML: 900 IRRIGANT IRRIGATION at 09:52

## 2017-11-15 RX ADMIN — SODIUM CHLORIDE 3000 ML: 900 IRRIGANT IRRIGATION at 02:00

## 2017-11-16 VITALS
TEMPERATURE: 98.8 F | RESPIRATION RATE: 20 BRPM | SYSTOLIC BLOOD PRESSURE: 132 MMHG | OXYGEN SATURATION: 97 % | WEIGHT: 247.8 LBS | DIASTOLIC BLOOD PRESSURE: 63 MMHG | BODY MASS INDEX: 33.56 KG/M2 | HEART RATE: 85 BPM | HEIGHT: 72 IN

## 2017-11-16 LAB
ALBUMIN SERPL-MCNC: 3.4 G/DL (ref 3.4–4.8)
ALBUMIN/GLOB SERPL: 1.3 G/DL (ref 1.5–2.5)
ALP SERPL-CCNC: 54 U/L (ref 40–129)
ALT SERPL W P-5'-P-CCNC: 23 U/L (ref 10–44)
ANION GAP SERPL CALCULATED.3IONS-SCNC: 4.8 MMOL/L (ref 3.6–11.2)
AST SERPL-CCNC: 36 U/L (ref 10–34)
BACTERIA SPEC AEROBE CULT: NORMAL
BASOPHILS # BLD AUTO: 0.03 10*3/MM3 (ref 0–0.3)
BASOPHILS NFR BLD AUTO: 0.3 % (ref 0–2)
BILIRUB SERPL-MCNC: 0.3 MG/DL (ref 0.2–1.8)
BUN BLD-MCNC: 10 MG/DL (ref 7–21)
BUN/CREAT SERPL: 10.9 (ref 7–25)
CALCIUM SPEC-SCNC: 8.3 MG/DL (ref 7.7–10)
CHLORIDE SERPL-SCNC: 107 MMOL/L (ref 99–112)
CO2 SERPL-SCNC: 24.2 MMOL/L (ref 24.3–31.9)
CREAT BLD-MCNC: 0.92 MG/DL (ref 0.43–1.29)
CRP SERPL-MCNC: 2.41 MG/DL (ref 0–0.99)
DEPRECATED RDW RBC AUTO: 50.5 FL (ref 37–54)
EOSINOPHIL # BLD AUTO: 0.23 10*3/MM3 (ref 0–0.7)
EOSINOPHIL NFR BLD AUTO: 2.6 % (ref 0–5)
ERYTHROCYTE [DISTWIDTH] IN BLOOD BY AUTOMATED COUNT: 15.6 % (ref 11.5–14.5)
GFR SERPL CREATININE-BSD FRML MDRD: 83 ML/MIN/1.73
GLOBULIN UR ELPH-MCNC: 2.7 GM/DL
GLUCOSE BLD-MCNC: 208 MG/DL (ref 70–110)
GLUCOSE BLDC GLUCOMTR-MCNC: 195 MG/DL (ref 70–130)
GLUCOSE BLDC GLUCOMTR-MCNC: 269 MG/DL (ref 70–130)
HCT VFR BLD AUTO: 28.3 % (ref 42–52)
HGB BLD-MCNC: 9.3 G/DL (ref 14–18)
IMM GRANULOCYTES # BLD: 0.01 10*3/MM3 (ref 0–0.03)
IMM GRANULOCYTES NFR BLD: 0.1 % (ref 0–0.5)
LYMPHOCYTES # BLD AUTO: 2.91 10*3/MM3 (ref 1–3)
LYMPHOCYTES NFR BLD AUTO: 32.7 % (ref 21–51)
MCH RBC QN AUTO: 30.5 PG (ref 27–33)
MCHC RBC AUTO-ENTMCNC: 32.9 G/DL (ref 33–37)
MCV RBC AUTO: 92.8 FL (ref 80–94)
MONOCYTES # BLD AUTO: 1.05 10*3/MM3 (ref 0.1–0.9)
MONOCYTES NFR BLD AUTO: 11.8 % (ref 0–10)
NEUTROPHILS # BLD AUTO: 4.67 10*3/MM3 (ref 1.4–6.5)
NEUTROPHILS NFR BLD AUTO: 52.5 % (ref 30–70)
OSMOLALITY SERPL CALC.SUM OF ELEC: 277.1 MOSM/KG (ref 273–305)
PLATELET # BLD AUTO: 195 10*3/MM3 (ref 130–400)
PMV BLD AUTO: 11.1 FL (ref 6–10)
POTASSIUM BLD-SCNC: 3.5 MMOL/L (ref 3.5–5.3)
PROT SERPL-MCNC: 6.1 G/DL (ref 6–8)
RBC # BLD AUTO: 3.05 10*6/MM3 (ref 4.7–6.1)
SODIUM BLD-SCNC: 136 MMOL/L (ref 135–153)
WBC NRBC COR # BLD: 8.9 10*3/MM3 (ref 4.5–12.5)

## 2017-11-16 PROCEDURE — 99231 SBSQ HOSP IP/OBS SF/LOW 25: CPT | Performed by: UROLOGY

## 2017-11-16 PROCEDURE — 80053 COMPREHEN METABOLIC PANEL: CPT | Performed by: INTERNAL MEDICINE

## 2017-11-16 PROCEDURE — 99239 HOSP IP/OBS DSCHRG MGMT >30: CPT | Performed by: INTERNAL MEDICINE

## 2017-11-16 PROCEDURE — 63710000001 INSULIN ASPART PER 5 UNITS: Performed by: HOSPITALIST

## 2017-11-16 PROCEDURE — 85025 COMPLETE CBC W/AUTO DIFF WBC: CPT | Performed by: INTERNAL MEDICINE

## 2017-11-16 PROCEDURE — 82962 GLUCOSE BLOOD TEST: CPT

## 2017-11-16 PROCEDURE — 86140 C-REACTIVE PROTEIN: CPT | Performed by: INTERNAL MEDICINE

## 2017-11-16 PROCEDURE — 94799 UNLISTED PULMONARY SVC/PX: CPT

## 2017-11-16 RX ORDER — LUBIPROSTONE 24 UG/1
24 CAPSULE ORAL 2 TIMES DAILY WITH MEALS
Qty: 60 CAPSULE | Refills: 0 | Status: SHIPPED | OUTPATIENT
Start: 2017-11-16 | End: 2019-06-25

## 2017-11-16 RX ORDER — AMOXICILLIN AND CLAVULANATE POTASSIUM 875; 125 MG/1; MG/1
1 TABLET, FILM COATED ORAL 2 TIMES DAILY
Qty: 14 TABLET | Refills: 0 | Status: SHIPPED | OUTPATIENT
Start: 2017-11-16 | End: 2017-11-23

## 2017-11-16 RX ORDER — HYDROCODONE BITARTRATE AND ACETAMINOPHEN 5; 325 MG/1; MG/1
1 TABLET ORAL EVERY 8 HOURS PRN
Qty: 9 TABLET | Refills: 0 | Status: SHIPPED | OUTPATIENT
Start: 2017-11-16 | End: 2017-11-25

## 2017-11-16 RX ORDER — FINASTERIDE 5 MG/1
5 TABLET, FILM COATED ORAL DAILY
Qty: 30 TABLET | Refills: 0 | Status: SHIPPED | OUTPATIENT
Start: 2017-11-17 | End: 2020-02-06 | Stop reason: SDUPTHER

## 2017-11-16 RX ADMIN — PIPERACILLIN SODIUM,TAZOBACTAM SODIUM 3.38 G: 3; .375 INJECTION, POWDER, FOR SOLUTION INTRAVENOUS at 15:06

## 2017-11-16 RX ADMIN — NICOTINE 1 PATCH: 21 PATCH TRANSDERMAL at 08:36

## 2017-11-16 RX ADMIN — LUBIPROSTONE 24 MCG: 24 CAPSULE, GELATIN COATED ORAL at 08:33

## 2017-11-16 RX ADMIN — ISOSORBIDE MONONITRATE 30 MG: 30 TABLET, EXTENDED RELEASE ORAL at 08:33

## 2017-11-16 RX ADMIN — ATENOLOL 25 MG: 25 TABLET ORAL at 08:33

## 2017-11-16 RX ADMIN — SODIUM CHLORIDE 100 ML/HR: 9 INJECTION, SOLUTION INTRAVENOUS at 02:50

## 2017-11-16 RX ADMIN — INSULIN ASPART 4 UNITS: 100 INJECTION, SOLUTION INTRAVENOUS; SUBCUTANEOUS at 12:42

## 2017-11-16 RX ADMIN — INSULIN ASPART 2 UNITS: 100 INJECTION, SOLUTION INTRAVENOUS; SUBCUTANEOUS at 08:33

## 2017-11-16 RX ADMIN — PANTOPRAZOLE SODIUM 40 MG: 40 TABLET, DELAYED RELEASE ORAL at 08:33

## 2017-11-16 RX ADMIN — PIPERACILLIN SODIUM,TAZOBACTAM SODIUM 3.38 G: 3; .375 INJECTION, POWDER, FOR SOLUTION INTRAVENOUS at 06:00

## 2017-11-16 RX ADMIN — FINASTERIDE 5 MG: 5 TABLET, FILM COATED ORAL at 08:33

## 2017-11-16 RX ADMIN — Medication 1 CAPSULE: at 08:33

## 2017-11-16 RX ADMIN — POLYETHYLENE GLYCOL (3350) 17 G: 17 POWDER, FOR SOLUTION ORAL at 08:33

## 2017-11-16 RX ADMIN — GABAPENTIN 100 MG: 100 CAPSULE ORAL at 08:33

## 2017-11-16 RX ADMIN — HYDROCODONE BITARTRATE AND ACETAMINOPHEN 1 TABLET: 5; 325 TABLET ORAL at 04:10

## 2017-11-16 RX ADMIN — VENLAFAXINE HYDROCHLORIDE 100 MG: 37.5 TABLET ORAL at 08:33

## 2017-11-18 LAB
BACTERIA SPEC AEROBE CULT: NORMAL
BACTERIA SPEC AEROBE CULT: NORMAL

## 2017-11-20 ENCOUNTER — HOSPITAL ENCOUNTER (EMERGENCY)
Facility: HOSPITAL | Age: 62
Discharge: HOME | End: 2017-11-20
Attending: EMERGENCY MEDICINE | Admitting: EMERGENCY MEDICINE

## 2017-11-20 VITALS
WEIGHT: 235 LBS | DIASTOLIC BLOOD PRESSURE: 74 MMHG | BODY MASS INDEX: 31.83 KG/M2 | TEMPERATURE: 98 F | SYSTOLIC BLOOD PRESSURE: 138 MMHG | OXYGEN SATURATION: 98 % | HEIGHT: 72 IN | RESPIRATION RATE: 18 BRPM | HEART RATE: 89 BPM

## 2017-11-20 DIAGNOSIS — T83.9XXA FOLEY CATHETER PROBLEM, INITIAL ENCOUNTER (HCC): Primary | ICD-10-CM

## 2017-11-20 LAB
ANION GAP SERPL CALCULATED.3IONS-SCNC: 6.8 MMOL/L (ref 3.6–11.2)
BASOPHILS # BLD AUTO: 0.05 10*3/MM3 (ref 0–0.3)
BASOPHILS NFR BLD AUTO: 0.3 % (ref 0–2)
BUN BLD-MCNC: 10 MG/DL (ref 7–21)
BUN/CREAT SERPL: 9.8 (ref 7–25)
CALCIUM SPEC-SCNC: 9.2 MG/DL (ref 7.7–10)
CHLORIDE SERPL-SCNC: 101 MMOL/L (ref 99–112)
CO2 SERPL-SCNC: 25.2 MMOL/L (ref 24.3–31.9)
CREAT BLD-MCNC: 1.02 MG/DL (ref 0.43–1.29)
DEPRECATED RDW RBC AUTO: 49.7 FL (ref 37–54)
EOSINOPHIL # BLD AUTO: 0.48 10*3/MM3 (ref 0–0.7)
EOSINOPHIL NFR BLD AUTO: 2.9 % (ref 0–5)
ERYTHROCYTE [DISTWIDTH] IN BLOOD BY AUTOMATED COUNT: 15.3 % (ref 11.5–14.5)
GFR SERPL CREATININE-BSD FRML MDRD: 74 ML/MIN/1.73
GLUCOSE BLD-MCNC: 277 MG/DL (ref 70–110)
HCT VFR BLD AUTO: 31.3 % (ref 42–52)
HGB BLD-MCNC: 10.3 G/DL (ref 14–18)
IMM GRANULOCYTES # BLD: 0.06 10*3/MM3 (ref 0–0.03)
IMM GRANULOCYTES NFR BLD: 0.4 % (ref 0–0.5)
LYMPHOCYTES # BLD AUTO: 4.38 10*3/MM3 (ref 1–3)
LYMPHOCYTES NFR BLD AUTO: 26.8 % (ref 21–51)
MCH RBC QN AUTO: 30.1 PG (ref 27–33)
MCHC RBC AUTO-ENTMCNC: 32.9 G/DL (ref 33–37)
MCV RBC AUTO: 91.5 FL (ref 80–94)
MONOCYTES # BLD AUTO: 2.04 10*3/MM3 (ref 0.1–0.9)
MONOCYTES NFR BLD AUTO: 12.5 % (ref 0–10)
NEUTROPHILS # BLD AUTO: 9.32 10*3/MM3 (ref 1.4–6.5)
NEUTROPHILS NFR BLD AUTO: 57.1 % (ref 30–70)
OSMOLALITY SERPL CALC.SUM OF ELEC: 275.3 MOSM/KG (ref 273–305)
PLATELET # BLD AUTO: 271 10*3/MM3 (ref 130–400)
PMV BLD AUTO: 10.6 FL (ref 6–10)
POTASSIUM BLD-SCNC: 3.8 MMOL/L (ref 3.5–5.3)
RBC # BLD AUTO: 3.42 10*6/MM3 (ref 4.7–6.1)
SODIUM BLD-SCNC: 133 MMOL/L (ref 135–153)
WBC NRBC COR # BLD: 16.33 10*3/MM3 (ref 4.5–12.5)

## 2017-11-20 PROCEDURE — 25010000002 MORPHINE PER 10 MG: Performed by: EMERGENCY MEDICINE

## 2017-11-20 PROCEDURE — 96374 THER/PROPH/DIAG INJ IV PUSH: CPT

## 2017-11-20 PROCEDURE — 85025 COMPLETE CBC W/AUTO DIFF WBC: CPT | Performed by: EMERGENCY MEDICINE

## 2017-11-20 PROCEDURE — 99284 EMERGENCY DEPT VISIT MOD MDM: CPT

## 2017-11-20 PROCEDURE — 80048 BASIC METABOLIC PNL TOTAL CA: CPT | Performed by: EMERGENCY MEDICINE

## 2017-11-20 RX ORDER — ONDANSETRON 4 MG/1
4 TABLET, ORALLY DISINTEGRATING ORAL ONCE
Status: COMPLETED | OUTPATIENT
Start: 2017-11-20 | End: 2017-11-20

## 2017-11-20 RX ORDER — MAGNESIUM HYDROXIDE 1200 MG/15ML
LIQUID ORAL
Status: COMPLETED
Start: 2017-11-20 | End: 2017-11-20

## 2017-11-20 RX ORDER — MORPHINE SULFATE 2 MG/ML
INJECTION, SOLUTION INTRAMUSCULAR; INTRAVENOUS
Status: DISCONTINUED
Start: 2017-11-20 | End: 2017-11-20 | Stop reason: WASHOUT

## 2017-11-20 RX ORDER — MAGNESIUM HYDROXIDE 1200 MG/15ML
1000 LIQUID ORAL ONCE
Status: DISCONTINUED | OUTPATIENT
Start: 2017-11-20 | End: 2017-11-20 | Stop reason: HOSPADM

## 2017-11-20 RX ORDER — MORPHINE SULFATE 2 MG/ML
2 INJECTION, SOLUTION INTRAMUSCULAR; INTRAVENOUS ONCE
Status: COMPLETED | OUTPATIENT
Start: 2017-11-20 | End: 2017-11-20

## 2017-11-20 RX ORDER — CIPROFLOXACIN 500 MG/1
500 TABLET, FILM COATED ORAL 2 TIMES DAILY
Qty: 20 TABLET | Refills: 0 | Status: SHIPPED | OUTPATIENT
Start: 2017-11-20 | End: 2019-06-25

## 2017-11-20 RX ADMIN — ONDANSETRON 4 MG: 4 TABLET, ORALLY DISINTEGRATING ORAL at 15:48

## 2017-11-20 RX ADMIN — SODIUM CHLORIDE: 900 IRRIGANT IRRIGATION at 15:32

## 2017-11-20 RX ADMIN — MORPHINE SULFATE 2 MG: 2 INJECTION, SOLUTION INTRAMUSCULAR; INTRAVENOUS at 15:47

## 2017-11-20 NOTE — ED NOTES
Patient has urinated independently without difficulty. Denies pain. Is very appreciative of out assistance. States he is 100% better.     Torie Hand RN  11/20/17 7311

## 2017-11-20 NOTE — ED NOTES
Gave pt Urine Cup. Pt states he is unable to use the bathroom at this time. Nurse notified.      Clovis Kennedy  11/20/17 1505

## 2017-11-21 NOTE — ED PROVIDER NOTES
Subjective   HPI Comments: 60-year-old white male presented to ER with complaints of urinary retention.  Patient's past medical history is positive for having Woodward catheter placed 5 days prior to arrival by Dr. Grace.  Patient denies history of cancer, admits to history of enlarged prostate.  Patient admitted that he has been passing blood clots in his urine that have been collecting in his leg bag.  Patient admitted to increase in pain.  Pain is localized to the penis.      Review of Systems   Constitutional: Negative.  Negative for fever.   HENT: Negative.    Respiratory: Negative.    Cardiovascular: Negative.  Negative for chest pain.   Gastrointestinal: Negative.  Negative for abdominal pain.   Endocrine: Negative.    Genitourinary: Positive for decreased urine volume, difficulty urinating and hematuria. Negative for dysuria.   Skin: Negative.    Neurological: Negative.    Psychiatric/Behavioral: Negative.    All other systems reviewed and are negative.      Past Medical History:   Diagnosis Date   • Claustrophobia    • COPD (chronic obstructive pulmonary disease)    • Diabetes    • Fracture, finger    • High cholesterol    • Hypertension    • MI (myocardial infarction)    • Prostate disease    • Stroke     X3 last one 9/2016   • TIA (transient ischemic attack)        No Known Allergies    Past Surgical History:   Procedure Laterality Date   • CARDIAC CATHETERIZATION  03/03/2014   • CARDIOVASCULAR STRESS TEST  04/04/2013   • CATARACT EXTRACTION     • CEREBRAL ANEURYSM REPAIR     • CORONARY ANGIOPLASTY WITH STENT PLACEMENT     • ECHO - CONVERTED  02/02/2014   • INNER EAR SURGERY         Family History   Problem Relation Age of Onset   • Osteoporosis Mother    • Cancer Mother      breast and lung   • Cancer Father      small cell cancer, kidney   • Heart disease Father    • Diabetes Father    • No Known Problems Sister    • Aneurysm Brother        Social History     Social History   • Marital status: Single      Spouse name: N/A   • Number of children: N/A   • Years of education: N/A     Social History Main Topics   • Smoking status: Current Every Day Smoker     Packs/day: 1.00   • Smokeless tobacco: Never Used   • Alcohol use No   • Drug use: No   • Sexual activity: Defer     Other Topics Concern   • None     Social History Narrative           Objective   Physical Exam   Constitutional: He is oriented to person, place, and time. He appears well-developed and well-nourished. No distress.   HENT:   Head: Normocephalic and atraumatic.   Nose: Nose normal.   Eyes: Conjunctivae and EOM are normal. Pupils are equal, round, and reactive to light.   Neck: Normal range of motion. Neck supple. No JVD present. No tracheal deviation present.   Cardiovascular: Normal rate, regular rhythm and normal heart sounds.    No murmur heard.  Pulmonary/Chest: Effort normal and breath sounds normal. No respiratory distress. He has no wheezes.   Abdominal: Soft. Bowel sounds are normal. There is no tenderness.   Genitourinary:   Genitourinary Comments: Glans penis appears normal, positive bloody discharge noted to the Woodward catheter.   Musculoskeletal: Normal range of motion. He exhibits no edema or deformity.   Neurological: He is alert and oriented to person, place, and time. No cranial nerve deficit.   Skin: Skin is warm and dry. No rash noted. He is not diaphoretic. No erythema. No pallor.   Psychiatric: He has a normal mood and affect. His behavior is normal. Thought content normal.   Nursing note and vitals reviewed.      Procedures         ED Course  ED Course   Comment By Time   Consulted Dr. Barrios, patient's urologist.  Patient has follow-up appointment in 6 days with Dr. Barrios, was given clearance to leave catheter out until follow-up appointment. JACOB Husain 11/20 1634                  MDM  Number of Diagnoses or Management Options  established and worsening     Amount and/or Complexity of Data Reviewed  Clinical lab tests:  reviewed    Risk of Complications, Morbidity, and/or Mortality  Presenting problems: moderate  Diagnostic procedures: moderate  Management options: low    Patient Progress  Patient progress: stable      Final diagnoses:   Woodward catheter problem, initial encounter            JACOB Husain  11/21/17 0244

## 2017-11-27 ENCOUNTER — OFFICE VISIT (OUTPATIENT)
Dept: UROLOGY | Facility: CLINIC | Age: 62
End: 2017-11-27

## 2017-11-27 DIAGNOSIS — N42.9 PROSTATE DISEASE: ICD-10-CM

## 2017-11-27 DIAGNOSIS — Z48.816 SURGICAL AFTERCARE, GENITOURINARY SYSTEM: Primary | ICD-10-CM

## 2017-11-27 DIAGNOSIS — R31.0 GROSS HEMATURIA: ICD-10-CM

## 2017-11-27 DIAGNOSIS — N21.0 BLADDER STONES: ICD-10-CM

## 2017-11-27 LAB
BILIRUB BLD-MCNC: NEGATIVE MG/DL
CLARITY, POC: CLEAR
COLOR UR: ABNORMAL
GLUCOSE UR STRIP-MCNC: ABNORMAL MG/DL
KETONES UR QL: NEGATIVE
LEUKOCYTE EST, POC: NEGATIVE
NITRITE UR-MCNC: NEGATIVE MG/ML
PH UR: 7 [PH] (ref 5–8)
PROT UR STRIP-MCNC: ABNORMAL MG/DL
RBC # UR STRIP: ABNORMAL /UL
SP GR UR: 1.01 (ref 1–1.03)
UROBILINOGEN UR QL: NORMAL

## 2017-11-27 PROCEDURE — 99214 OFFICE O/P EST MOD 30 MIN: CPT | Performed by: UROLOGY

## 2017-11-27 PROCEDURE — 81003 URINALYSIS AUTO W/O SCOPE: CPT | Performed by: UROLOGY

## 2017-11-27 PROCEDURE — 52000 CYSTOURETHROSCOPY: CPT | Performed by: UROLOGY

## 2017-11-27 PROCEDURE — 96372 THER/PROPH/DIAG INJ SC/IM: CPT | Performed by: UROLOGY

## 2017-11-27 RX ORDER — GENTAMICIN SULFATE 40 MG/ML
80 INJECTION, SOLUTION INTRAMUSCULAR; INTRAVENOUS ONCE
Status: COMPLETED | OUTPATIENT
Start: 2017-11-27 | End: 2017-11-27

## 2017-11-27 RX ADMIN — GENTAMICIN SULFATE 80 MG: 40 INJECTION, SOLUTION INTRAMUSCULAR; INTRAVENOUS at 15:15

## 2017-11-27 NOTE — PROGRESS NOTES
Chief Complaint:          Chief Complaint   Patient presents with   • Blood in Urine       HPI:   62 y.o. male.  62-year-old white male previously a patient here and saw Ginna in 2014 with BPH placed on doxazosin.  Apparently on Sunday started with significant pain and strangury as well as gross hematuria presents today clot retention having significant pain.  No fevers or chills.  He is in significant pain he is on Plavix for heart disease and stents.  I went ahead and anchored a catheter to gravity drainage and irrigated 5 L of fluid with a large amount of clot and cessation of the blood I told him to see the prostate bleeding or bladder tumor.  He has no flank pain or upper tract symptomatology whatsoever.  I left a 22 [place] catheter gravity drainage its clear him gentamicin and have a CBC and a BMP pending at the time of this dictation.  He had she was admitted the night after the above aforementioned visit.  In fact he was found to have massive BPH and bladder stones as the probable etiology of his symptomatology.  He actually did well after release.  He voided 500 cc after his catheter occluded knee pain now presents for cystoscopy.  Cystoscopy showed a large amount of clot I'm going to set him up for cystoscopy clot evacuation cystoscopy litholapaxy and follow-up based on this on Wednesday morning.  He is maximized with regard to his medical therapy.  He still has poorly controlled diabetes and significant heart disease but he is off the anticoagulant.          Past Medical History:        Past Medical History:   Diagnosis Date   • Claustrophobia    • COPD (chronic obstructive pulmonary disease)    • Diabetes    • Fracture, finger    • High cholesterol    • Hypertension    • MI (myocardial infarction)    • Prostate disease    • Stroke     X3 last one 9/2016   • TIA (transient ischemic attack)          Current Meds:     Current Outpatient Prescriptions   Medication Sig Dispense Refill   • atenolol  (TENORMIN) 25 MG tablet Take 25 mg by mouth daily.     • atorvastatin (LIPITOR) 40 MG tablet Take 40 mg by mouth daily.     • Blood Glucose Monitoring Suppl (ACCU-CHEK JOSE G) device Use as directed to test blood sugar three times daily and and bedtime. 1 each 0   • ciprofloxacin (CIPRO) 500 MG tablet Take 1 tablet by mouth 2 (Two) Times a Day. 20 tablet 0   • doxazosin (CARDURA) 8 MG tablet Take 8 mg by mouth Every Morning.     • finasteride (PROSCAR) 5 MG tablet Take 1 tablet by mouth Daily. 30 tablet 0   • gabapentin (NEURONTIN) 400 MG capsule Take 400 mg by mouth 2 (two) times a day.     • glimepiride (AMARYL) 4 MG tablet Take 4 mg by mouth daily.     • glucose blood test strip Use as directed to test blood sugar three times daily and and bedtime. 120 each 0   • insulin aspart (novoLOG FLEXPEN) 100 UNIT/ML solution pen-injector sc pen Use as directed 4 times daily per sliding scale (0 to 7 units/dose). 15 mL 0   • insulin detemir (LEVEMIR) 100 UNIT/ML injection Inject 15 Units under the skin Daily. 15 mL 0   • Insulin Pen Needle 29G X 12.7MM misc Use as directed with insulin three times daily with meals and at bedtime. 120 each 0   • isosorbide mononitrate (IMDUR) 30 MG 24 hr tablet Take 30 mg by mouth daily.     • losartan (COZAAR) 100 MG tablet Take 1 tablet by mouth daily. (Patient taking differently: Take 100 mg by mouth 2 (Two) Times a Day. Is prescribed as 100 mg daily) 30 tablet 2   • lubiprostone (AMITIZA) 24 MCG capsule Take 1 capsule by mouth 2 (Two) Times a Day With Meals. 60 capsule 0   • metFORMIN (GLUCOPHAGE) 1000 MG tablet Take 1,000 mg by mouth Every Night.     • metFORMIN (GLUCOPHAGE) 500 MG tablet Take 500 mg by mouth Every Morning. 500 mg am  1000 mg qhs     • pantoprazole (PROTONIX) 40 MG EC tablet Take 40 mg by mouth daily.     • polyethylene glycol (MIRALAX) pack packet Mix 17 g with liquid of choice and drink   Daily. 500 g 0   • TRUEPLUS LANCETS 33G misc Use as directed to test blood sugar  three times daily and and bedtime. 120 each 0   • venlafaxine (EFFEXOR) 100 MG tablet Take 100 mg by mouth 2 (Two) Times a Day.     • zolpidem (AMBIEN) 10 MG tablet Take 10 mg by mouth at night as needed.       No current facility-administered medications for this visit.         Allergies:      No Known Allergies     Past Surgical History:     Past Surgical History:   Procedure Laterality Date   • CARDIAC CATHETERIZATION  03/03/2014   • CARDIOVASCULAR STRESS TEST  04/04/2013   • CATARACT EXTRACTION     • CEREBRAL ANEURYSM REPAIR     • CORONARY ANGIOPLASTY WITH STENT PLACEMENT     • ECHO - CONVERTED  02/02/2014   • INNER EAR SURGERY           Social History:     Social History     Social History   • Marital status: Single     Spouse name: N/A   • Number of children: N/A   • Years of education: N/A     Occupational History   • Not on file.     Social History Main Topics   • Smoking status: Current Every Day Smoker     Packs/day: 1.00   • Smokeless tobacco: Never Used   • Alcohol use No   • Drug use: No   • Sexual activity: Defer     Other Topics Concern   • Not on file     Social History Narrative       Family History:     Family History   Problem Relation Age of Onset   • Osteoporosis Mother    • Cancer Mother      breast and lung   • Cancer Father      small cell cancer, kidney   • Heart disease Father    • Diabetes Father    • No Known Problems Sister    • Aneurysm Brother        Review of Systems:     Review of Systems   Constitutional: Negative.    HENT: Negative.    Eyes: Negative.    Respiratory: Negative.    Cardiovascular: Negative.    Gastrointestinal: Negative.    Endocrine: Negative.    Genitourinary: Positive for dysuria, frequency, hematuria and urgency.   Musculoskeletal: Negative.    Allergic/Immunologic: Negative.    Neurological: Negative.    Hematological: Negative.    Psychiatric/Behavioral: Negative.        Physical Exam:     Physical Exam   Constitutional: He is oriented to person, place, and  time. He appears well-developed and well-nourished.   HENT:   Head: Normocephalic and atraumatic.   Eyes: Conjunctivae and EOM are normal. Pupils are equal, round, and reactive to light.   Neck: Normal range of motion.   Cardiovascular: Normal rate, regular rhythm, normal heart sounds and intact distal pulses.    Pulmonary/Chest: Effort normal and breath sounds normal.   Abdominal: Soft. Bowel sounds are normal.   Genitourinary: Prostate normal and penis normal.   Genitourinary Comments: Circumcised normal phallus   Musculoskeletal: Normal range of motion.   Neurological: He is alert and oriented to person, place, and time. He has normal reflexes.   Skin: Skin is warm and dry.   Psychiatric: He has a normal mood and affect. His behavior is normal. Judgment and thought content normal.   Nursing note and vitals reviewed.      Procedure:   Cystoscopy:  Patient presents today for cystourethroscopy.  I went ahead and obtained an informed consent including the risk of anesthesia, bleeding, infection, etc.  After prep and drape in a sterile fashion in the low dorsal lithotomy position the urethra was gently anesthetized with 10 cc of 2% viscous Xylocaine jelly.  After an appropriate period of topical anesthesia I used the Olympus digital 14 [place] flexible cystoscope to examine the anterior urethra which was completely normal, the ureteral orifices were visualized and normal in position and configuration there were no , tumors or foreign bodies.  There was a massive-sized prostate with friability, bladder stones, some adherent clot to the posterior aspect of the bladder.  The blue light was enabled and was negative allowing us to see small mucosal lesions. The patient was given 80 mg of gentamicin in an intramuscular fashion  as prophylaxis for the cystoscopy and released from the clinic.    Assessment:   No diagnosis found.  No orders of the defined types were placed in this encounter.      Plan:   Bladder stones-for  cystoscopy litholapaxy on Wednesday, November 29, 2017  Massive BPH as the etiology of his bleeding.  He will require close inspection of the mucosa to rule out any small bladder tumors.           This document has been electronically signed by TRACE BELLO MD November 27, 2017 3:02 PM

## 2017-11-28 ENCOUNTER — APPOINTMENT (OUTPATIENT)
Dept: PREADMISSION TESTING | Facility: HOSPITAL | Age: 62
End: 2017-11-28

## 2017-11-28 PROBLEM — N21.0 BLADDER STONES: Status: ACTIVE | Noted: 2017-11-28

## 2017-11-28 RX ORDER — GENTAMICIN SULFATE 80 MG/100ML
80 INJECTION, SOLUTION INTRAVENOUS ONCE
Status: CANCELLED | OUTPATIENT
Start: 2017-11-29 | End: 2017-11-28

## 2017-11-29 ENCOUNTER — ANESTHESIA EVENT (OUTPATIENT)
Dept: PERIOP | Facility: HOSPITAL | Age: 62
End: 2017-11-29

## 2017-11-29 ENCOUNTER — ANESTHESIA (OUTPATIENT)
Dept: PERIOP | Facility: HOSPITAL | Age: 62
End: 2017-11-29

## 2017-11-29 ENCOUNTER — HOSPITAL ENCOUNTER (OUTPATIENT)
Facility: HOSPITAL | Age: 62
Discharge: HOME | End: 2017-11-29
Attending: UROLOGY | Admitting: UROLOGY

## 2017-11-29 ENCOUNTER — APPOINTMENT (OUTPATIENT)
Dept: GENERAL RADIOLOGY | Facility: HOSPITAL | Age: 62
End: 2017-11-29
Attending: UROLOGY

## 2017-11-29 VITALS
SYSTOLIC BLOOD PRESSURE: 152 MMHG | OXYGEN SATURATION: 98 % | RESPIRATION RATE: 16 BRPM | HEIGHT: 72 IN | DIASTOLIC BLOOD PRESSURE: 79 MMHG | TEMPERATURE: 97.7 F | HEART RATE: 86 BPM | BODY MASS INDEX: 34.27 KG/M2 | WEIGHT: 253 LBS

## 2017-11-29 DIAGNOSIS — N21.0 BLADDER STONES: ICD-10-CM

## 2017-11-29 PROBLEM — Z48.816 SURGICAL AFTERCARE, GENITOURINARY SYSTEM: Status: ACTIVE | Noted: 2017-11-29

## 2017-11-29 LAB
GLUCOSE BLDC GLUCOMTR-MCNC: 248 MG/DL (ref 70–130)
GLUCOSE BLDC GLUCOMTR-MCNC: 276 MG/DL (ref 70–130)

## 2017-11-29 PROCEDURE — 25010000002 FENTANYL CITRATE (PF) 100 MCG/2ML SOLUTION: Performed by: NURSE ANESTHETIST, CERTIFIED REGISTERED

## 2017-11-29 PROCEDURE — 25010000002 MIDAZOLAM PER 1 MG: Performed by: NURSE ANESTHETIST, CERTIFIED REGISTERED

## 2017-11-29 PROCEDURE — 25010000002 PROPOFOL 10 MG/ML EMULSION: Performed by: NURSE ANESTHETIST, CERTIFIED REGISTERED

## 2017-11-29 PROCEDURE — 25010000002 ONDANSETRON PER 1 MG: Performed by: NURSE ANESTHETIST, CERTIFIED REGISTERED

## 2017-11-29 PROCEDURE — A9270 NON-COVERED ITEM OR SERVICE: HCPCS

## 2017-11-29 PROCEDURE — 82360 CALCULUS ASSAY QUANT: CPT | Performed by: UROLOGY

## 2017-11-29 PROCEDURE — 82962 GLUCOSE BLOOD TEST: CPT

## 2017-11-29 PROCEDURE — 63710000001 BELLADONNA-OPIUM 16.2-30 MG SUPPOSITORY

## 2017-11-29 PROCEDURE — 52318 REMOVE BLADDER STONE: CPT | Performed by: UROLOGY

## 2017-11-29 PROCEDURE — 25010000002 GENTAMICIN PER 80 MG: Performed by: UROLOGY

## 2017-11-29 RX ORDER — OXYCODONE AND ACETAMINOPHEN 10; 325 MG/1; MG/1
1 TABLET ORAL EVERY 4 HOURS PRN
Qty: 12 TABLET | Refills: 0 | Status: SHIPPED | OUTPATIENT
Start: 2017-11-29 | End: 2019-06-25

## 2017-11-29 RX ORDER — ONDANSETRON 2 MG/ML
INJECTION INTRAMUSCULAR; INTRAVENOUS AS NEEDED
Status: DISCONTINUED | OUTPATIENT
Start: 2017-11-29 | End: 2017-11-29 | Stop reason: SURG

## 2017-11-29 RX ORDER — MIDAZOLAM HYDROCHLORIDE 1 MG/ML
INJECTION INTRAMUSCULAR; INTRAVENOUS AS NEEDED
Status: DISCONTINUED | OUTPATIENT
Start: 2017-11-29 | End: 2017-11-29 | Stop reason: SURG

## 2017-11-29 RX ORDER — FENTANYL CITRATE 50 UG/ML
50 INJECTION, SOLUTION INTRAMUSCULAR; INTRAVENOUS
Status: DISCONTINUED | OUTPATIENT
Start: 2017-11-29 | End: 2017-11-29 | Stop reason: HOSPADM

## 2017-11-29 RX ORDER — LIDOCAINE HYDROCHLORIDE 20 MG/ML
INJECTION, SOLUTION INFILTRATION; PERINEURAL AS NEEDED
Status: DISCONTINUED | OUTPATIENT
Start: 2017-11-29 | End: 2017-11-29 | Stop reason: SURG

## 2017-11-29 RX ORDER — MAGNESIUM HYDROXIDE 1200 MG/15ML
LIQUID ORAL AS NEEDED
Status: DISCONTINUED | OUTPATIENT
Start: 2017-11-29 | End: 2017-11-29 | Stop reason: HOSPADM

## 2017-11-29 RX ORDER — SODIUM CHLORIDE, SODIUM LACTATE, POTASSIUM CHLORIDE, CALCIUM CHLORIDE 600; 310; 30; 20 MG/100ML; MG/100ML; MG/100ML; MG/100ML
1000 INJECTION, SOLUTION INTRAVENOUS CONTINUOUS PRN
Status: DISCONTINUED | OUTPATIENT
Start: 2017-11-29 | End: 2017-11-29 | Stop reason: HOSPADM

## 2017-11-29 RX ORDER — PROPOFOL 10 MG/ML
VIAL (ML) INTRAVENOUS AS NEEDED
Status: DISCONTINUED | OUTPATIENT
Start: 2017-11-29 | End: 2017-11-29 | Stop reason: SURG

## 2017-11-29 RX ORDER — GENTAMICIN SULFATE 80 MG/100ML
80 INJECTION, SOLUTION INTRAVENOUS ONCE
Status: COMPLETED | OUTPATIENT
Start: 2017-11-29 | End: 2017-11-29

## 2017-11-29 RX ORDER — FENTANYL CITRATE 50 UG/ML
INJECTION, SOLUTION INTRAMUSCULAR; INTRAVENOUS AS NEEDED
Status: DISCONTINUED | OUTPATIENT
Start: 2017-11-29 | End: 2017-11-29 | Stop reason: SURG

## 2017-11-29 RX ORDER — ONDANSETRON 2 MG/ML
4 INJECTION INTRAMUSCULAR; INTRAVENOUS ONCE AS NEEDED
Status: DISCONTINUED | OUTPATIENT
Start: 2017-11-29 | End: 2017-11-29 | Stop reason: HOSPADM

## 2017-11-29 RX ORDER — IPRATROPIUM BROMIDE AND ALBUTEROL SULFATE 2.5; .5 MG/3ML; MG/3ML
3 SOLUTION RESPIRATORY (INHALATION) ONCE AS NEEDED
Status: DISCONTINUED | OUTPATIENT
Start: 2017-11-29 | End: 2017-11-29 | Stop reason: HOSPADM

## 2017-11-29 RX ORDER — OXYCODONE HYDROCHLORIDE AND ACETAMINOPHEN 5; 325 MG/1; MG/1
1 TABLET ORAL ONCE AS NEEDED
Status: DISCONTINUED | OUTPATIENT
Start: 2017-11-29 | End: 2017-11-29 | Stop reason: HOSPADM

## 2017-11-29 RX ORDER — MEPERIDINE HYDROCHLORIDE 25 MG/ML
12.5 INJECTION INTRAMUSCULAR; INTRAVENOUS; SUBCUTANEOUS
Status: DISCONTINUED | OUTPATIENT
Start: 2017-11-29 | End: 2017-11-29 | Stop reason: HOSPADM

## 2017-11-29 RX ADMIN — MIDAZOLAM HYDROCHLORIDE 2 MG: 1 INJECTION, SOLUTION INTRAMUSCULAR; INTRAVENOUS at 10:04

## 2017-11-29 RX ADMIN — GENTAMICIN SULFATE 80 MG: 80 INJECTION, SOLUTION INTRAVENOUS at 09:59

## 2017-11-29 RX ADMIN — FENTANYL CITRATE 50 MCG: 50 INJECTION INTRAMUSCULAR; INTRAVENOUS at 10:40

## 2017-11-29 RX ADMIN — SODIUM CHLORIDE, POTASSIUM CHLORIDE, SODIUM LACTATE AND CALCIUM CHLORIDE 1000 ML: 600; 310; 30; 20 INJECTION, SOLUTION INTRAVENOUS at 09:16

## 2017-11-29 RX ADMIN — FENTANYL CITRATE 50 MCG: 50 INJECTION INTRAMUSCULAR; INTRAVENOUS at 10:13

## 2017-11-29 RX ADMIN — FENTANYL CITRATE 100 MCG: 50 INJECTION INTRAMUSCULAR; INTRAVENOUS at 10:04

## 2017-11-29 RX ADMIN — ONDANSETRON 4 MG: 2 INJECTION, SOLUTION INTRAMUSCULAR; INTRAVENOUS at 10:04

## 2017-11-29 RX ADMIN — LIDOCAINE HYDROCHLORIDE 40 MG: 20 INJECTION, SOLUTION INFILTRATION; PERINEURAL at 10:04

## 2017-11-29 RX ADMIN — PROPOFOL 100 MG: 10 INJECTION, EMULSION INTRAVENOUS at 10:04

## 2017-11-29 NOTE — ANESTHESIA PREPROCEDURE EVALUATION
Anesthesia Evaluation     Patient summary reviewed and Nursing notes reviewed   no history of anesthetic complications:  NPO Solid Status: > 8 hours  NPO Liquid Status: > 8 hours     Airway   Mallampati: III  TM distance: <3 FB  Neck ROM: limited  possible difficult intubation  Dental - normal exam   (+) edentulous    Pulmonary - negative pulmonary ROS and normal exam   Cardiovascular - normal exam  Exercise tolerance: poor (<4 METS)    NYHA Classification: IV        Neuro/Psych- negative ROS  GI/Hepatic/Renal/Endo - negative ROS     Musculoskeletal (-) negative ROS    Abdominal  - normal exam    Bowel sounds: normal.   Substance History - negative use     OB/GYN negative ob/gyn ROS         Other - negative ROS                                       Anesthesia Plan    ASA 3     intravenous and inhalational induction   Anesthetic plan and risks discussed with patient.  Use of blood products discussed with patient  Consented to blood products.   Plan discussed with CRNA.

## 2017-11-29 NOTE — ANESTHESIA POSTPROCEDURE EVALUATION
Patient: Jovanni Richter    Procedure Summary     Date Anesthesia Start Anesthesia Stop Room / Location    11/29/17 0958 1048  [facility] / [facility]       Procedure Diagnosis Surgeon Provider    CYSTOSCOPY LITHOLAPAXY BLADDER STONE EXTRACTION (N/A ) Bladder stones  (Bladder stones [N21.0]) MD Christopher Dooley DO          Anesthesia Type: No value filed.  Last vitals  BP   157/79 (11/29/17 1120)   Temp   97.7 °F (36.5 °C) (11/29/17 1049)   Pulse   82 (11/29/17 1120)   Resp   12 (11/29/17 1120)     SpO2   97 % (11/29/17 1120)     Post Anesthesia Care and Evaluation    Patient location during evaluation: bedside  Patient participation: complete - patient participated  Level of consciousness: awake and alert  Pain score: 1  Pain management: adequate  Airway patency: patent  Anesthetic complications: No anesthetic complications  PONV Status: none  Cardiovascular status: acceptable  Respiratory status: acceptable  Hydration status: acceptable

## 2017-11-29 NOTE — ANESTHESIA PROCEDURE NOTES
Airway    General Information and Staff    CRNA: [facility]    Indications and Patient Condition    Preoxygenated: yes  Mask difficulty assessment: 0 - not attempted    Final Airway Details  Final airway type: supraglottic airway      Successful airway: classic  Size 4    Number of attempts at approach: 1

## 2017-12-07 LAB
CA PHOS CRY STONE QL IR: 25 %
COLOR STONE: NORMAL
COM CRY STONE QL IR: 75 %
COMPN STONE: NORMAL
Lab: NORMAL
Lab: NORMAL
NIDUS STONE QL: NORMAL
PATH REPORT.COMMENTS IMP SPEC: NORMAL
SIZE STONE: NORMAL MM
WT STONE: 1742 MG

## 2017-12-21 ENCOUNTER — OFFICE VISIT (OUTPATIENT)
Dept: UROLOGY | Facility: CLINIC | Age: 62
End: 2017-12-21

## 2017-12-21 DIAGNOSIS — N40.1 BENIGN PROSTATIC HYPERPLASIA WITH URINARY FREQUENCY: ICD-10-CM

## 2017-12-21 DIAGNOSIS — N21.0 BLADDER STONES: Primary | ICD-10-CM

## 2017-12-21 DIAGNOSIS — R35.0 BENIGN PROSTATIC HYPERPLASIA WITH URINARY FREQUENCY: ICD-10-CM

## 2017-12-21 PROCEDURE — 99214 OFFICE O/P EST MOD 30 MIN: CPT | Performed by: UROLOGY

## 2017-12-21 NOTE — PROGRESS NOTES
Chief Complaint:          Chief Complaint   Patient presents with   • Nephrolithiasis       HPI:   62 y.o. male.  62-year-old white male previously a patient here and saw Ginna in 2014 with BPH placed on doxazosin.  Apparently on Sunday started with significant pain and strangury as well as gross hematuria presents today clot retention having significant pain.  No fevers or chills.  He is in significant pain he is on Plavix for heart disease and stents.  I went ahead and anchored a catheter to gravity drainage and irrigated 5 L of fluid with a large amount of clot and cessation of the blood I told him to see the prostate bleeding or bladder tumor.  He has no flank pain or upper tract symptomatology whatsoever.  I left a 22 Italian catheter gravity drainage its clear him gentamicin and have a CBC and a BMP pending at the time of this dictation.  He had she was admitted the night after the above aforementioned visit.  In fact he was found to have massive BPH and bladder stones as the probable etiology of his symptomatology.  He actually did well after release.  He voided 500 cc after his catheter occluded knee pain now presents for cystoscopy.  Cystoscopy showed a large amount of clot I'm going to set him up for cystoscopy clot evacuation cystoscopy litholapaxy and follow-up based on this on Wednesday morning.  He is maximized with regard to his medical therapy.  He still has poorly controlled diabetes and significant heart disease but he is off the anticoagulant.           He returns today he's doing fantastic he has a great stream he gets up every 2 hours at night most likely secondary to his poorly controlled diabetes he has no pain with urination he has no bleeding he is a solid stream.  I told him that's okay to restart his anticoagulation but he needs to check with his cardiologist.  Overall is done extremely well.  He has no significant prostatic obstruction and I expect him to continue on finasteride and  doing extremely well and check him back in 6 months with a PSA        Past Medical History:        Past Medical History:   Diagnosis Date   • Arthritis    • Bladder stones    • BPH (benign prostatic hyperplasia)    • Cancer     skin ca- felix   • Claustrophobia    • COPD (chronic obstructive pulmonary disease)    • Coronary artery disease    • Diabetes    • Difficulty urinating    • Fracture, finger    • Hematuria    • High cholesterol    • History of sepsis    • Hypertension    • MI (myocardial infarction)    • Prostate disease    • Stroke     X3 last one 9/2016   TIA   • TIA (transient ischemic attack)    • UTI (urinary tract infection)          Current Meds:     Current Outpatient Prescriptions   Medication Sig Dispense Refill   • atenolol (TENORMIN) 25 MG tablet Take 25 mg by mouth daily.     • atorvastatin (LIPITOR) 40 MG tablet Take 40 mg by mouth daily.     • Blood Glucose Monitoring Suppl (ACCU-CHEK JOSE G) device Use as directed to test blood sugar three times daily and and bedtime. 1 each 0   • ciprofloxacin (CIPRO) 500 MG tablet Take 1 tablet by mouth 2 (Two) Times a Day. 20 tablet 0   • Clopidogrel Bisulfate (PLAVIX PO) Take 75 mg by mouth Daily.     • doxazosin (CARDURA) 8 MG tablet Take 8 mg by mouth Every Morning.     • finasteride (PROSCAR) 5 MG tablet Take 1 tablet by mouth Daily. 30 tablet 0   • gabapentin (NEURONTIN) 400 MG capsule Take 400 mg by mouth 2 (two) times a day.     • glimepiride (AMARYL) 4 MG tablet Take 4 mg by mouth daily.     • glucose blood test strip Use as directed to test blood sugar three times daily and and bedtime. 120 each 0   • insulin aspart (novoLOG FLEXPEN) 100 UNIT/ML solution pen-injector sc pen Use as directed 4 times daily per sliding scale (0 to 7 units/dose). (Patient taking differently: 2 (Two) Times a Day.) 15 mL 0   • insulin detemir (LEVEMIR) 100 UNIT/ML injection Inject 15 Units under the skin Daily. 15 mL 0   • Insulin Pen Needle 29G X 12.7MM misc Use as  directed with insulin three times daily with meals and at bedtime. 120 each 0   • isosorbide mononitrate (IMDUR) 30 MG 24 hr tablet Take 30 mg by mouth daily.     • losartan (COZAAR) 100 MG tablet Take 1 tablet by mouth daily. (Patient taking differently: Take 100 mg by mouth 2 (Two) Times a Day. Is prescribed as 100 mg daily) 30 tablet 2   • lubiprostone (AMITIZA) 24 MCG capsule Take 1 capsule by mouth 2 (Two) Times a Day With Meals. 60 capsule 0   • metFORMIN (GLUCOPHAGE) 1000 MG tablet Take 1,000 mg by mouth Every Night.     • oxyCODONE-acetaminophen (PERCOCET)  MG per tablet Take 1 tablet by mouth Every 4 (Four) Hours As Needed for Moderate Pain 12 tablet 0   • pantoprazole (PROTONIX) 40 MG EC tablet Take 40 mg by mouth daily.     • polyethylene glycol (MIRALAX) pack packet Mix 17 g with liquid of choice and drink   Daily. 500 g 0   • TRUEPLUS LANCETS 33G misc Use as directed to test blood sugar three times daily and and bedtime. 120 each 0   • venlafaxine (EFFEXOR) 100 MG tablet Take 100 mg by mouth 2 (Two) Times a Day.     • zolpidem (AMBIEN) 10 MG tablet Take 10 mg by mouth at night as needed.       No current facility-administered medications for this visit.         Allergies:      No Known Allergies     Past Surgical History:     Past Surgical History:   Procedure Laterality Date   • CARDIAC CATHETERIZATION  03/03/2014   • CARDIAC SURGERY     • CARDIOVASCULAR STRESS TEST  04/04/2013   • CATARACT EXTRACTION     • CEREBRAL ANEURYSM REPAIR     • COLONOSCOPY     • CORONARY ANGIOPLASTY WITH STENT PLACEMENT     • CYSTOSCOPY LITHOLAPAXY BLADDER STONE EXTRACTION N/A 11/29/2017    Procedure: CYSTOSCOPY LITHOLAPAXY BLADDER STONE EXTRACTION;  Surgeon: Flavio Barrios MD;  Location: [facility];  Service:    • ECHO - CONVERTED  02/02/2014   • EYE SURGERY     • INNER EAR SURGERY     • SKIN BIOPSY     • TONSILLECTOMY           Social History:     Social History     Social History   • Marital status: Single      Spouse name: N/A   • Number of children: N/A   • Years of education: N/A     Occupational History   • Not on file.     Social History Main Topics   • Smoking status: Current Every Day Smoker     Packs/day: 1.00     Years: 45.00   • Smokeless tobacco: Never Used   • Alcohol use Yes   • Drug use: No   • Sexual activity: Defer     Other Topics Concern   • Not on file     Social History Narrative       Family History:     Family History   Problem Relation Age of Onset   • Osteoporosis Mother    • Cancer Mother      breast and lung   • Cancer Father      small cell cancer, kidney   • Heart disease Father    • Diabetes Father    • No Known Problems Sister    • Aneurysm Brother        Review of Systems:     Review of Systems   Constitutional: Negative.    HENT: Negative.    Eyes: Negative.    Respiratory: Negative.    Cardiovascular: Negative.    Gastrointestinal: Negative.    Endocrine: Negative.    Genitourinary: Positive for frequency.   Musculoskeletal: Negative.    Allergic/Immunologic: Negative.    Neurological: Negative.    Hematological: Negative.    Psychiatric/Behavioral: Negative.        Physical Exam:     Physical Exam   Constitutional: He is oriented to person, place, and time. He appears well-developed and well-nourished.   HENT:   Head: Normocephalic and atraumatic.   Eyes: Conjunctivae and EOM are normal. Pupils are equal, round, and reactive to light.   Neck: Normal range of motion.   Cardiovascular: Normal rate, regular rhythm, normal heart sounds and intact distal pulses.    Pulmonary/Chest: Effort normal and breath sounds normal.   Abdominal: Soft. Bowel sounds are normal.   Musculoskeletal: Normal range of motion.   Neurological: He is alert and oriented to person, place, and time. He has normal reflexes.   Skin: Skin is warm and dry.   Psychiatric: He has a normal mood and affect. His behavior is normal. Judgment and thought content normal.   Nursing note and vitals reviewed.      Procedure:        Assessment:   No diagnosis found.  No orders of the defined types were placed in this encounter.      Plan:   Gross hematuria secondary to over anticoagulation and a bladder calculus calculus is now resolved he's doing much better we'll continue close observation.  BPH-massive recommended continued finasteride.             This document has been electronically signed by TRACE BELLO MD December 21, 2017 9:03 AM

## 2018-04-16 ENCOUNTER — TRANSCRIBE ORDERS (OUTPATIENT)
Dept: ADMINISTRATIVE | Facility: HOSPITAL | Age: 63
End: 2018-04-16

## 2018-04-16 ENCOUNTER — LAB (OUTPATIENT)
Dept: LAB | Facility: HOSPITAL | Age: 63
End: 2018-04-16

## 2018-04-16 DIAGNOSIS — D64.9 ANEMIA, UNSPECIFIED TYPE: ICD-10-CM

## 2018-04-16 DIAGNOSIS — D64.9 ANEMIA, UNSPECIFIED TYPE: Primary | ICD-10-CM

## 2018-04-16 LAB
ANISOCYTOSIS BLD QL: NORMAL
BASOPHILS # BLD AUTO: 0.04 10*3/MM3 (ref 0–0.3)
BASOPHILS NFR BLD AUTO: 0.3 % (ref 0–2)
DEPRECATED RDW RBC AUTO: 54.9 FL (ref 37–54)
EOSINOPHIL # BLD AUTO: 0.28 10*3/MM3 (ref 0–0.7)
EOSINOPHIL NFR BLD AUTO: 2.1 % (ref 0–5)
ERYTHROCYTE [DISTWIDTH] IN BLOOD BY AUTOMATED COUNT: 21.9 % (ref 11.5–14.5)
HCT VFR BLD AUTO: 33.4 % (ref 42–52)
HGB BLD-MCNC: 10.1 G/DL (ref 14–18)
HYPOCHROMIA BLD QL: NORMAL
IMM GRANULOCYTES # BLD: 0.07 10*3/MM3 (ref 0–0.03)
IMM GRANULOCYTES NFR BLD: 0.5 % (ref 0–0.5)
LYMPHOCYTES # BLD AUTO: 2.99 10*3/MM3 (ref 1–3)
LYMPHOCYTES NFR BLD AUTO: 22.6 % (ref 21–51)
MCH RBC QN AUTO: 21 PG (ref 27–33)
MCHC RBC AUTO-ENTMCNC: 30.2 G/DL (ref 33–37)
MCV RBC AUTO: 69.3 FL (ref 80–94)
MICROCYTES BLD QL: NORMAL
MONOCYTES # BLD AUTO: 1.41 10*3/MM3 (ref 0.1–0.9)
MONOCYTES NFR BLD AUTO: 10.7 % (ref 0–10)
NEUTROPHILS # BLD AUTO: 8.44 10*3/MM3 (ref 1.4–6.5)
NEUTROPHILS NFR BLD AUTO: 63.8 % (ref 30–70)
OVALOCYTES BLD QL SMEAR: NORMAL
PLAT MORPH BLD: NORMAL
PLATELET # BLD AUTO: 370 10*3/MM3 (ref 130–400)
PMV BLD AUTO: 10.3 FL (ref 6–10)
RBC # BLD AUTO: 4.82 10*6/MM3 (ref 4.7–6.1)
SCHISTOCYTES BLD QL SMEAR: NORMAL
WBC NRBC COR # BLD: 13.23 10*3/MM3 (ref 4.5–12.5)

## 2018-04-16 PROCEDURE — 85007 BL SMEAR W/DIFF WBC COUNT: CPT

## 2018-04-16 PROCEDURE — 85025 COMPLETE CBC W/AUTO DIFF WBC: CPT

## 2018-04-16 PROCEDURE — 36415 COLL VENOUS BLD VENIPUNCTURE: CPT

## 2018-04-17 ENCOUNTER — HOSPITAL ENCOUNTER (OUTPATIENT)
Dept: GENERAL RADIOLOGY | Facility: HOSPITAL | Age: 63
Discharge: HOME | End: 2018-04-17
Admitting: NURSE PRACTITIONER

## 2018-04-17 ENCOUNTER — TRANSCRIBE ORDERS (OUTPATIENT)
Dept: ADMINISTRATIVE | Facility: HOSPITAL | Age: 63
End: 2018-04-17

## 2018-04-17 DIAGNOSIS — R06.00 DYSPNEA, UNSPECIFIED TYPE: ICD-10-CM

## 2018-04-17 DIAGNOSIS — R07.81 RIB PAIN: ICD-10-CM

## 2018-04-17 DIAGNOSIS — R07.81 RIB PAIN: Primary | ICD-10-CM

## 2018-04-17 PROCEDURE — 71110 X-RAY EXAM RIBS BIL 3 VIEWS: CPT | Performed by: RADIOLOGY

## 2018-04-17 PROCEDURE — 71046 X-RAY EXAM CHEST 2 VIEWS: CPT | Performed by: RADIOLOGY

## 2018-04-17 PROCEDURE — 71110 X-RAY EXAM RIBS BIL 3 VIEWS: CPT

## 2018-04-17 PROCEDURE — 71046 X-RAY EXAM CHEST 2 VIEWS: CPT

## 2018-04-19 PROCEDURE — 96376 TX/PRO/DX INJ SAME DRUG ADON: CPT

## 2018-04-19 PROCEDURE — 96375 TX/PRO/DX INJ NEW DRUG ADDON: CPT

## 2018-04-19 PROCEDURE — 96366 THER/PROPH/DIAG IV INF ADDON: CPT

## 2018-04-19 PROCEDURE — 96365 THER/PROPH/DIAG IV INF INIT: CPT

## 2018-04-19 PROCEDURE — 99283 EMERGENCY DEPT VISIT LOW MDM: CPT

## 2018-04-20 ENCOUNTER — APPOINTMENT (OUTPATIENT)
Dept: CT IMAGING | Facility: HOSPITAL | Age: 63
End: 2018-04-20

## 2018-04-20 ENCOUNTER — HOSPITAL ENCOUNTER (EMERGENCY)
Facility: HOSPITAL | Age: 63
Discharge: HOME | End: 2018-04-20
Attending: EMERGENCY MEDICINE | Admitting: EMERGENCY MEDICINE

## 2018-04-20 VITALS
HEIGHT: 72 IN | RESPIRATION RATE: 18 BRPM | OXYGEN SATURATION: 98 % | DIASTOLIC BLOOD PRESSURE: 69 MMHG | HEART RATE: 69 BPM | TEMPERATURE: 97.7 F | WEIGHT: 235 LBS | SYSTOLIC BLOOD PRESSURE: 123 MMHG | BODY MASS INDEX: 31.83 KG/M2

## 2018-04-20 DIAGNOSIS — S22.41XA MULTIPLE FRACTURES OF RIBS, RIGHT SIDE, INITIAL ENCOUNTER FOR CLOSED FRACTURE: Primary | ICD-10-CM

## 2018-04-20 LAB
ALBUMIN SERPL-MCNC: 4 G/DL (ref 3.4–4.8)
ALBUMIN/GLOB SERPL: 1.1 G/DL (ref 1.5–2.5)
ALP SERPL-CCNC: 98 U/L (ref 40–129)
ALT SERPL W P-5'-P-CCNC: 21 U/L (ref 10–44)
ANION GAP SERPL CALCULATED.3IONS-SCNC: 4.4 MMOL/L (ref 3.6–11.2)
ANISOCYTOSIS BLD QL: NORMAL
APTT PPP: 29.5 SECONDS (ref 23.8–36.1)
AST SERPL-CCNC: 18 U/L (ref 10–34)
BACTERIA UR QL AUTO: ABNORMAL /HPF
BASOPHILS # BLD AUTO: 0.03 10*3/MM3 (ref 0–0.3)
BASOPHILS NFR BLD AUTO: 0.3 % (ref 0–2)
BILIRUB SERPL-MCNC: 0.2 MG/DL (ref 0.2–1.8)
BILIRUB UR QL STRIP: NEGATIVE
BUN BLD-MCNC: 11 MG/DL (ref 7–21)
BUN/CREAT SERPL: 8.9 (ref 7–25)
CALCIUM SPEC-SCNC: 9.3 MG/DL (ref 7.7–10)
CHLORIDE SERPL-SCNC: 100 MMOL/L (ref 99–112)
CLARITY UR: ABNORMAL
CO2 SERPL-SCNC: 23.6 MMOL/L (ref 24.3–31.9)
COLOR UR: YELLOW
CREAT BLD-MCNC: 1.23 MG/DL (ref 0.43–1.29)
DEPRECATED RDW RBC AUTO: 56.1 FL (ref 37–54)
EOSINOPHIL # BLD AUTO: 0.25 10*3/MM3 (ref 0–0.7)
EOSINOPHIL NFR BLD AUTO: 2.1 % (ref 0–5)
ERYTHROCYTE [DISTWIDTH] IN BLOOD BY AUTOMATED COUNT: 22.7 % (ref 11.5–14.5)
GFR SERPL CREATININE-BSD FRML MDRD: 60 ML/MIN/1.73
GLOBULIN UR ELPH-MCNC: 3.7 GM/DL
GLUCOSE BLD-MCNC: 404 MG/DL (ref 70–110)
GLUCOSE UR STRIP-MCNC: ABNORMAL MG/DL
HCT VFR BLD AUTO: 33.2 % (ref 42–52)
HGB BLD-MCNC: 10.3 G/DL (ref 14–18)
HGB UR QL STRIP.AUTO: ABNORMAL
HYALINE CASTS UR QL AUTO: ABNORMAL /LPF
HYPOCHROMIA BLD QL: NORMAL
IMM GRANULOCYTES # BLD: 0.05 10*3/MM3 (ref 0–0.03)
IMM GRANULOCYTES NFR BLD: 0.4 % (ref 0–0.5)
INR PPP: 1 (ref 0.9–1.1)
KETONES UR QL STRIP: NEGATIVE
LEUKOCYTE ESTERASE UR QL STRIP.AUTO: ABNORMAL
LYMPHOCYTES # BLD AUTO: 3.25 10*3/MM3 (ref 1–3)
LYMPHOCYTES NFR BLD AUTO: 27.6 % (ref 21–51)
MCH RBC QN AUTO: 21.6 PG (ref 27–33)
MCHC RBC AUTO-ENTMCNC: 31 G/DL (ref 33–37)
MCV RBC AUTO: 69.6 FL (ref 80–94)
MICROCYTES BLD QL: NORMAL
MONOCYTES # BLD AUTO: 1 10*3/MM3 (ref 0.1–0.9)
MONOCYTES NFR BLD AUTO: 8.5 % (ref 0–10)
NEUTROPHILS # BLD AUTO: 7.21 10*3/MM3 (ref 1.4–6.5)
NEUTROPHILS NFR BLD AUTO: 61.1 % (ref 30–70)
NITRITE UR QL STRIP: NEGATIVE
OSMOLALITY SERPL CALC.SUM OF ELEC: 273.5 MOSM/KG (ref 273–305)
OVALOCYTES BLD QL SMEAR: NORMAL
PH UR STRIP.AUTO: 6 [PH] (ref 5–8)
PLAT MORPH BLD: NORMAL
PLATELET # BLD AUTO: 418 10*3/MM3 (ref 130–400)
PMV BLD AUTO: 10 FL (ref 6–10)
POTASSIUM BLD-SCNC: 4.5 MMOL/L (ref 3.5–5.3)
PROT SERPL-MCNC: 7.7 G/DL (ref 6–8)
PROT UR QL STRIP: NEGATIVE
PROTHROMBIN TIME: 13.3 SECONDS (ref 11–15.4)
RBC # BLD AUTO: 4.77 10*6/MM3 (ref 4.7–6.1)
RBC # UR: ABNORMAL /HPF
REF LAB TEST METHOD: ABNORMAL
SODIUM BLD-SCNC: 128 MMOL/L (ref 135–153)
SP GR UR STRIP: 1.02 (ref 1–1.03)
SQUAMOUS #/AREA URNS HPF: ABNORMAL /HPF
UROBILINOGEN UR QL STRIP: ABNORMAL
WBC NRBC COR # BLD: 11.79 10*3/MM3 (ref 4.5–12.5)
WBC UR QL AUTO: ABNORMAL /HPF

## 2018-04-20 PROCEDURE — 85007 BL SMEAR W/DIFF WBC COUNT: CPT | Performed by: EMERGENCY MEDICINE

## 2018-04-20 PROCEDURE — 25010000002 MORPHINE PER 10 MG: Performed by: EMERGENCY MEDICINE

## 2018-04-20 PROCEDURE — 85610 PROTHROMBIN TIME: CPT | Performed by: EMERGENCY MEDICINE

## 2018-04-20 PROCEDURE — 96366 THER/PROPH/DIAG IV INF ADDON: CPT

## 2018-04-20 PROCEDURE — 96376 TX/PRO/DX INJ SAME DRUG ADON: CPT

## 2018-04-20 PROCEDURE — 87086 URINE CULTURE/COLONY COUNT: CPT | Performed by: EMERGENCY MEDICINE

## 2018-04-20 PROCEDURE — 74177 CT ABD & PELVIS W/CONTRAST: CPT | Performed by: RADIOLOGY

## 2018-04-20 PROCEDURE — 25010000002 IOPAMIDOL 61 % SOLUTION: Performed by: EMERGENCY MEDICINE

## 2018-04-20 PROCEDURE — 85025 COMPLETE CBC W/AUTO DIFF WBC: CPT | Performed by: EMERGENCY MEDICINE

## 2018-04-20 PROCEDURE — 85730 THROMBOPLASTIN TIME PARTIAL: CPT | Performed by: EMERGENCY MEDICINE

## 2018-04-20 PROCEDURE — 71260 CT THORAX DX C+: CPT

## 2018-04-20 PROCEDURE — 71260 CT THORAX DX C+: CPT | Performed by: RADIOLOGY

## 2018-04-20 PROCEDURE — 81001 URINALYSIS AUTO W/SCOPE: CPT | Performed by: EMERGENCY MEDICINE

## 2018-04-20 PROCEDURE — 80053 COMPREHEN METABOLIC PANEL: CPT | Performed by: EMERGENCY MEDICINE

## 2018-04-20 PROCEDURE — 96365 THER/PROPH/DIAG IV INF INIT: CPT

## 2018-04-20 PROCEDURE — 96375 TX/PRO/DX INJ NEW DRUG ADDON: CPT

## 2018-04-20 PROCEDURE — 25010000002 PROMETHAZINE PER 50 MG: Performed by: EMERGENCY MEDICINE

## 2018-04-20 PROCEDURE — 74177 CT ABD & PELVIS W/CONTRAST: CPT

## 2018-04-20 RX ORDER — SODIUM CHLORIDE 0.9 % (FLUSH) 0.9 %
10 SYRINGE (ML) INJECTION AS NEEDED
Status: DISCONTINUED | OUTPATIENT
Start: 2018-04-20 | End: 2018-04-20 | Stop reason: HOSPADM

## 2018-04-20 RX ORDER — SODIUM CHLORIDE 9 MG/ML
125 INJECTION, SOLUTION INTRAVENOUS CONTINUOUS
Status: DISCONTINUED | OUTPATIENT
Start: 2018-04-20 | End: 2018-04-20 | Stop reason: HOSPADM

## 2018-04-20 RX ORDER — ONDANSETRON 4 MG/1
4 TABLET, ORALLY DISINTEGRATING ORAL 4 TIMES DAILY
Qty: 15 TABLET | Refills: 0 | Status: SHIPPED | OUTPATIENT
Start: 2018-04-20 | End: 2019-06-25

## 2018-04-20 RX ORDER — ALBUTEROL SULFATE 90 UG/1
2-4 AEROSOL, METERED RESPIRATORY (INHALATION) EVERY 4 HOURS PRN
Qty: 1 INHALER | Refills: 0 | Status: SHIPPED | OUTPATIENT
Start: 2018-04-20 | End: 2019-06-25

## 2018-04-20 RX ORDER — OXYCODONE AND ACETAMINOPHEN 10; 325 MG/1; MG/1
1 TABLET ORAL EVERY 4 HOURS PRN
Qty: 12 TABLET | Refills: 0 | Status: SHIPPED | OUTPATIENT
Start: 2018-04-20 | End: 2019-06-25

## 2018-04-20 RX ADMIN — PROMETHAZINE HYDROCHLORIDE 12.5 MG: 25 INJECTION INTRAMUSCULAR; INTRAVENOUS at 01:19

## 2018-04-20 RX ADMIN — MORPHINE SULFATE 4 MG: 4 INJECTION, SOLUTION INTRAMUSCULAR; INTRAVENOUS at 01:20

## 2018-04-20 RX ADMIN — SODIUM CHLORIDE 125 ML/HR: 9 INJECTION, SOLUTION INTRAVENOUS at 01:20

## 2018-04-20 RX ADMIN — IOPAMIDOL 95 ML: 612 INJECTION, SOLUTION INTRAVENOUS at 02:48

## 2018-04-20 RX ADMIN — PROMETHAZINE HYDROCHLORIDE 12.5 MG: 25 INJECTION INTRAMUSCULAR; INTRAVENOUS at 04:39

## 2018-04-20 RX ADMIN — MORPHINE SULFATE 4 MG: 4 INJECTION, SOLUTION INTRAMUSCULAR; INTRAVENOUS at 04:39

## 2018-04-20 NOTE — ED NOTES
Rt at bedside with incentive spirometer, provided patient and family on how to use.     Kirstin Fink RN  04/20/18 2095

## 2018-04-20 NOTE — ED PROVIDER NOTES
Subjective   Pt comes in with c/o severe right sided Cp.  Pain onset after pt fell out of his chair against a bench T-4.  Hurts to move, breathe, cough and to touch.          History provided by:  Patient  Fall   Mechanism of injury: fall    Injury location:  Torso  Torso injury location:  R chest  Incident location:  Home  Time since incident:  4 days  Arrived directly from scene: no    Fall:     Fall occurred: Out of chair.    Impact surface: against bench.    Point of impact: Right side.    Entrapped after fall: no    Protective equipment: none    Suspicion of alcohol use: no    Suspicion of drug use: no    Tetanus status:  Unknown  Prior to arrival data:     Bystander interventions:  None    Patient ambulatory at scene: yes      Blood loss:  None    Responsiveness at scene:  Alert    Orientation at scene:  Person, place, situation and time    Loss of consciousness: no      Amnesic to event: no      Airway interventions:  None    Breathing interventions:  None    IV access status:  None    IO access:  None    Fluids administered:  None    Cardiac interventions:  None    Medications administered:  None    Immobilization:  None    Airway condition since incident:  Stable    Breathing condition since incident:  Stable    Circulation condition since incident:  Stable    Mental status condition since incident:  Stable    Disability condition since incident:  Stable  Associated symptoms: abdominal pain, chest pain and difficulty breathing    Associated symptoms: no back pain, no blindness, no headaches, no hearing loss, no loss of consciousness, no nausea, no neck pain, no seizures and no vomiting    Risk factors: no AICD, no anticoagulation therapy, no asthma, no beta blocker therapy, no dialysis, no hemophilia, no kidney disease, not pregnant and no steroid use        Review of Systems   Constitutional: Positive for activity change. Negative for appetite change and fever.   HENT: Negative.  Negative for hearing loss  and nosebleeds.    Eyes: Negative.  Negative for blindness.   Respiratory: Positive for cough, chest tightness and shortness of breath.    Cardiovascular: Positive for chest pain.   Gastrointestinal: Positive for abdominal pain. Negative for nausea and vomiting.   Endocrine: Negative.    Genitourinary: Negative.    Musculoskeletal: Negative.  Negative for back pain and neck pain.   Skin: Negative.  Negative for color change and wound.   Allergic/Immunologic: Negative.    Neurological: Negative.  Negative for seizures, loss of consciousness and headaches.   Hematological: Negative.    Psychiatric/Behavioral: Negative.    All other systems reviewed and are negative.      Past Medical History:   Diagnosis Date   • Arthritis    • Bladder stones    • BPH (benign prostatic hyperplasia)    • Cancer     skin ca- felix   • Claustrophobia    • COPD (chronic obstructive pulmonary disease)    • Coronary artery disease    • Diabetes    • Difficulty urinating    • Fracture, finger    • Hematuria    • High cholesterol    • History of sepsis    • Hypertension    • MI (myocardial infarction)    • Prostate disease    • Stroke     X3 last one 9/2016   TIA   • TIA (transient ischemic attack)    • UTI (urinary tract infection)        No Known Allergies    Past Surgical History:   Procedure Laterality Date   • CARDIAC CATHETERIZATION  03/03/2014   • CARDIAC SURGERY     • CARDIOVASCULAR STRESS TEST  04/04/2013   • CATARACT EXTRACTION     • CEREBRAL ANEURYSM REPAIR     • COLONOSCOPY     • CORONARY ANGIOPLASTY WITH STENT PLACEMENT     • CYSTOSCOPY LITHOLAPAXY BLADDER STONE EXTRACTION N/A 11/29/2017    Procedure: CYSTOSCOPY LITHOLAPAXY BLADDER STONE EXTRACTION;  Surgeon: Flavio Barrios MD;  Location: [facility];  Service:    • ECHO - CONVERTED  02/02/2014   • EYE SURGERY     • INNER EAR SURGERY     • SKIN BIOPSY     • TONSILLECTOMY         Family History   Problem Relation Age of Onset   • Osteoporosis Mother    • Cancer Mother       breast and lung   • Cancer Father      small cell cancer, kidney   • Heart disease Father    • Diabetes Father    • No Known Problems Sister    • Aneurysm Brother        Social History     Social History   • Marital status: Single     Social History Main Topics   • Smoking status: Current Every Day Smoker     Packs/day: 1.00     Years: 45.00   • Smokeless tobacco: Never Used   • Alcohol use Yes   • Drug use: No   • Sexual activity: Defer     Other Topics Concern   • Not on file           Objective   Physical Exam   Constitutional: He is oriented to person, place, and time. He appears well-developed and well-nourished. He appears distressed.   HENT:   Head: Normocephalic and atraumatic.   Nose: Nose normal.   Mouth/Throat: Oropharynx is clear and moist.   Eyes: Conjunctivae and EOM are normal. Pupils are equal, round, and reactive to light. Right eye exhibits no discharge. Left eye exhibits no discharge. No scleral icterus.   Neck: Normal range of motion. Neck supple. No tracheal deviation present. No thyromegaly present.   Cardiovascular: Normal rate, regular rhythm, normal heart sounds and intact distal pulses.  Exam reveals no gallop and no friction rub.    No murmur heard.  Pulmonary/Chest: No stridor. No respiratory distress. He has no wheezes. He has no rales. He exhibits tenderness.   Shallow resps.  Pt cries out in pain with any light touch, including lifting shirt with no skin/chest contact  No visible wound or discoloration   Abdominal: He exhibits no distension and no mass. There is tenderness. There is no guarding.   Musculoskeletal: Normal range of motion. He exhibits no tenderness or deformity.   Lymphadenopathy:     He has no cervical adenopathy.   Neurological: He is alert and oriented to person, place, and time. He exhibits normal muscle tone. Coordination normal.   Skin: Skin is warm and dry. Capillary refill takes less than 2 seconds. No erythema. No pallor.   Psychiatric:   Anxious   Nursing note  and vitals reviewed.      Procedures         ED Course  ED Course   Comment By Time   Matty Isbell report [id number] requested received and reviewed.  15 active morphine equivalent.  Patient appears to get regular prescriptions of zolpidem and gabapentin from Dr. Nathan Espinal.  Patient last received tramadol on 17 April 2018.  He occasionally gets oxycodone/acetaminophen 10/325 last on November 29, 2017. Tommy Partida MD 04/20 0140     CT Chest With Contrast   ED Interpretation   CT chest with IV contrast   Faxed report from [facility]   Impression:   1.  Nondisplaced fractures of the right eighth and ninth lateral ribs.   2.  No pneumothorax or pleural fluid collection.   Signed Matt Boggs M.D.      CT Abdomen Pelvis With Contrast   ED Interpretation   CT abdomen and pelvis with IV contrast   Faxed report from [facility]   Impression:   1.  Nondisplaced fractures of the right eighth and ninth lateral ribs.   2.  Enlarged prostate gland measuring 5.5 cm transversely.   3.  Mild diffuse wall thickening of the urinary bladder which may be secondary to the enlarged prostate gland or cystitis.  Air within the anterior portion of the bladder lumen, possibly following recent catheterization.   4.  Multiple calcified stones within the urinary bladder lumen.   5.  Otherwise, no acute intra-abdominal or pelvic process.   Signed Matt Boggs M.D.        Labs Reviewed   COMPREHENSIVE METABOLIC PANEL - Abnormal; Notable for the following:        Result Value    Glucose 404 (*)     Sodium 128 (*)     CO2 23.6 (*)     eGFR Non  Amer 60 (*)     A/G Ratio 1.1 (*)     All other components within normal limits   URINALYSIS W/ CULTURE IF INDICATED - Abnormal; Notable for the following:     Appearance, UA Cloudy (*)     Glucose, UA >=1000 mg/dL (3+) (*)     Blood, UA Trace (*)     Leuk Esterase, UA Moderate (2+) (*)     All other components within normal limits   CBC WITH AUTO DIFFERENTIAL -  Abnormal; Notable for the following:     Hemoglobin 10.3 (*)     Hematocrit 33.2 (*)     MCV 69.6 (*)     MCH 21.6 (*)     MCHC 31.0 (*)     RDW 22.7 (*)     RDW-SD 56.1 (*)     Platelets 418 (*)     Neutrophils, Absolute 7.21 (*)     Lymphocytes, Absolute 3.25 (*)     Monocytes, Absolute 1.00 (*)     Immature Grans, Absolute 0.05 (*)     All other components within normal limits   URINALYSIS, MICROSCOPIC ONLY - Abnormal; Notable for the following:     WBC, UA 13-20 (*)     All other components within normal limits   PROTIME-INR - Normal    Narrative:     Suggested INR therapeutic range for stable oral anticoagulant therapy:    Low Intensity therapy:   1.5-2.0  Moderate Intensity therapy:   2.0-3.0  High Intensity therapy:   2.5-4.0   APTT - Normal    Narrative:     PTT Heparin Therapeutic Range:  59 - 95 seconds   OSMOLALITY, CALCULATED - Normal   URINE CULTURE   SCAN SLIDE   CBC AND DIFFERENTIAL    Narrative:     The following orders were created for panel order CBC & Differential.  Procedure                               Abnormality         Status                     ---------                               -----------         ------                     Scan Slide[[id number]]                                       Final result               CBC Auto Differential[[id number]]        Abnormal            Final result                 Please view results for these tests on the individual orders.        Medication List      START taking these medications    albuterol 108 (90 Base) MCG/ACT inhaler  Commonly known as:  PROVENTIL HFA;VENTOLIN HFA  Inhale 2-4 puffs Every 4 (Four) Hours As Needed for Wheezing or Shortness   of Air.     ondansetron ODT 4 MG disintegrating tablet  Commonly known as:  ZOFRAN-ODT  Take 1 tablet by mouth 4 (Four) Times a Day.        CHANGE how you take these medications    losartan 100 MG tablet  Commonly known as:  COZAAR  Take 1 tablet by mouth daily.  What changed:  when to take this  additional  instructions     NOVOLOG FLEXPEN 100 UNIT/ML solution pen-injector sc pen  Generic drug:  insulin aspart  Use as directed 4 times daily per sliding scale (0 to 7 units/dose).  What changed:  when to take this  additional instructions     * oxyCODONE-acetaminophen  MG per tablet  Commonly known as:  PERCOCET  Take 1 tablet by mouth Every 4 (Four) Hours As Needed for Moderate Pain  What changed:  Another medication with the same name was added. Make sure   you understand how and when to take each.     * oxyCODONE-acetaminophen  MG per tablet  Commonly known as:  PERCOCET  Take 1 tablet by mouth Every 4 (Four) Hours As Needed for Moderate Pain .  What changed:  You were already taking a medication with the same name,   and this prescription was added. Make sure you understand how and when to   take each.        * This list has 2 medication(s) that are the same as other medications   prescribed for you. Read the directions carefully, and ask your doctor or   other care provider to review them with you.            CONTINUE taking these medications    ACCU-CHEK JOSE G device  Use as directed to test blood sugar three times daily and and bedtime.     AMITIZA 24 MCG capsule  Generic drug:  lubiprostone  Take 1 capsule by mouth 2 (Two) Times a Day With Meals.     atenolol 25 MG tablet  Commonly known as:  TENORMIN     atorvastatin 40 MG tablet  Commonly known as:  LIPITOR     BD ULTRA-FINE PEN NEEDLES 29G X 12.7MM misc  Generic drug:  Insulin Pen Needle  Use as directed with insulin three times daily with meals and at bedtime.     ciprofloxacin 500 MG tablet  Commonly known as:  CIPRO  Take 1 tablet by mouth 2 (Two) Times a Day.     doxazosin 8 MG tablet  Commonly known as:  CARDURA     finasteride 5 MG tablet  Commonly known as:  PROSCAR  Take 1 tablet by mouth Daily.     gabapentin 400 MG capsule  Commonly known as:  NEURONTIN     glimepiride 4 MG tablet  Commonly known as:  AMARYL     glucose blood test  strip  Use as directed to test blood sugar three times daily and and bedtime.     isosorbide mononitrate 30 MG 24 hr tablet  Commonly known as:  IMDUR     LEVEMIR FLEXTOUCH 100 UNIT/ML injection  Generic drug:  insulin detemir  Inject 15 Units under the skin Daily.     metFORMIN 1000 MG tablet  Commonly known as:  GLUCOPHAGE     pantoprazole 40 MG EC tablet  Commonly known as:  PROTONIX     PLAVIX PO     polyethylene glycol packet  Commonly known as:  MIRALAX  Mix 17 g with liquid of choice and drink  Daily.     TRUEPLUS LANCETS 33G misc  Use as directed to test blood sugar three times daily and and bedtime.     venlafaxine 100 MG tablet  Commonly known as:  EFFEXOR     zolpidem 10 MG tablet  Commonly known as:  AMBIEN                MDM  Number of Diagnoses or Management Options  Multiple fractures of ribs, right side, initial encounter for closed fracture: new and requires workup     Amount and/or Complexity of Data Reviewed  Clinical lab tests: ordered and reviewed  Tests in the radiology section of CPT®: ordered and reviewed  Obtain history from someone other than the patient: yes  Independent visualization of images, tracings, or specimens: yes    Risk of Complications, Morbidity, and/or Mortality  Presenting problems: moderate  Diagnostic procedures: high  Management options: moderate    Patient Progress  Patient progress: stable      Final diagnoses:   Multiple fractures of ribs, right side, initial encounter for closed fracture            Tommy Partida MD  04/20/18 0436

## 2018-04-21 ENCOUNTER — TELEPHONE (OUTPATIENT)
Dept: EMERGENCY DEPT | Facility: HOSPITAL | Age: 63
End: 2018-04-21

## 2018-04-21 LAB — BACTERIA SPEC AEROBE CULT: NORMAL

## 2018-04-22 ENCOUNTER — TELEPHONE (OUTPATIENT)
Dept: EMERGENCY DEPT | Facility: HOSPITAL | Age: 63
End: 2018-04-22

## 2018-05-07 DIAGNOSIS — R06.02 SOB (SHORTNESS OF BREATH): Primary | ICD-10-CM

## 2018-05-10 ENCOUNTER — OFFICE VISIT (OUTPATIENT)
Dept: PULMONOLOGY | Facility: CLINIC | Age: 63
End: 2018-05-10

## 2018-05-10 ENCOUNTER — HOSPITAL ENCOUNTER (OUTPATIENT)
Dept: GENERAL RADIOLOGY | Facility: HOSPITAL | Age: 63
Discharge: HOME | End: 2018-05-10
Attending: INTERNAL MEDICINE | Admitting: INTERNAL MEDICINE

## 2018-05-10 VITALS
BODY MASS INDEX: 31.83 KG/M2 | WEIGHT: 235 LBS | OXYGEN SATURATION: 98 % | SYSTOLIC BLOOD PRESSURE: 119 MMHG | HEIGHT: 72 IN | HEART RATE: 72 BPM | DIASTOLIC BLOOD PRESSURE: 64 MMHG | TEMPERATURE: 97.8 F

## 2018-05-10 DIAGNOSIS — J44.9 CHRONIC OBSTRUCTIVE PULMONARY DISEASE, UNSPECIFIED COPD TYPE (HCC): ICD-10-CM

## 2018-05-10 DIAGNOSIS — R91.1 PULMONARY NODULE SEEN ON IMAGING STUDY: Primary | ICD-10-CM

## 2018-05-10 PROCEDURE — 71046 X-RAY EXAM CHEST 2 VIEWS: CPT

## 2018-05-10 PROCEDURE — 99203 OFFICE O/P NEW LOW 30 MIN: CPT | Performed by: INTERNAL MEDICINE

## 2018-05-10 PROCEDURE — 71046 X-RAY EXAM CHEST 2 VIEWS: CPT | Performed by: RADIOLOGY

## 2018-05-10 NOTE — PROGRESS NOTES
History of Present Illness 63-year-old gentleman referred for evaluation of pulmonary nodule that was incidental finding on the CT chest that he recently had he fell off the chair and broke several ribs in the right side the emergency room where he had a chest x-ray first that was negative and had rib series that showed Fracture of seventh rib and 2 days later had a CT chest and additional fractures and incidental finding of a small subpleural nodule in the right middle lobe is past history is significant for smoking One pack a day for 40 Years was in spite of having a heart attack 4 years ago ending up to 3 dispense also had a stroke and in remote past in 1987 had to brain aneurysm operation aspirin Plavix and losartan and has some right-sided repeat pain.8 Was a  and Has No History of Adult Stage Occupation      Review of SystemsRight-sided 3 pain since his fall recently and was has minimal cough and some shortness of breath on exertion no chest pain palpitation or edema review of system otherwise noncontributory    Active Problems:  Problem List Items Addressed This Visit        Respiratory    COPD (chronic obstructive pulmonary disease)      Other Visit Diagnoses     Pulmonary nodule seen on imaging study    -  Primary          Past Medical History:  Past Medical History:   Diagnosis Date   • Arthritis    • Bladder stones    • BPH (benign prostatic hyperplasia)    • Cancer     skin ca- felix   • Claustrophobia    • COPD (chronic obstructive pulmonary disease)    • Coronary artery disease    • Diabetes    • Difficulty urinating    • Fracture, finger    • Hematuria    • High cholesterol    • History of sepsis    • Hypertension    • MI (myocardial infarction)    • Prostate disease    • Stroke     X3 last one 9/2016   TIA   • TIA (transient ischemic attack)    • UTI (urinary tract infection)        Family History:  Family History   Problem Relation Age of Onset   • Osteoporosis Mother    • Cancer Mother       breast and lung   • Cancer Father      small cell cancer, kidney   • Heart disease Father    • Diabetes Father    • No Known Problems Sister    • Aneurysm Brother        Social History:  Social History   Substance Use Topics   • Smoking status: Current Every Day Smoker     Packs/day: 1.00     Years: 45.00   • Smokeless tobacco: Never Used   • Alcohol use No       Current Medications:  Current Outpatient Prescriptions   Medication Sig Dispense Refill   • albuterol (PROVENTIL HFA;VENTOLIN HFA) 108 (90 Base) MCG/ACT inhaler Inhale 2-4 puffs Every 4 (Four) Hours As Needed for Wheezing or Shortness of Air. 1 inhaler 0   • atenolol (TENORMIN) 25 MG tablet Take 25 mg by mouth daily.     • atorvastatin (LIPITOR) 40 MG tablet Take 40 mg by mouth daily.     • Blood Glucose Monitoring Suppl (ACCU-CHEK JOSE G) device Use as directed to test blood sugar three times daily and and bedtime. 1 each 0   • ciprofloxacin (CIPRO) 500 MG tablet Take 1 tablet by mouth 2 (Two) Times a Day. 20 tablet 0   • Clopidogrel Bisulfate (PLAVIX PO) Take 75 mg by mouth Daily.     • doxazosin (CARDURA) 8 MG tablet Take 8 mg by mouth Every Morning.     • finasteride (PROSCAR) 5 MG tablet Take 1 tablet by mouth Daily. 30 tablet 0   • gabapentin (NEURONTIN) 400 MG capsule Take 400 mg by mouth 2 (two) times a day.     • glimepiride (AMARYL) 4 MG tablet Take 4 mg by mouth daily.     • glucose blood test strip Use as directed to test blood sugar three times daily and and bedtime. 120 each 0   • insulin aspart (novoLOG FLEXPEN) 100 UNIT/ML solution pen-injector sc pen Use as directed 4 times daily per sliding scale (0 to 7 units/dose). (Patient taking differently: 2 (Two) Times a Day.) 15 mL 0   • insulin detemir (LEVEMIR) 100 UNIT/ML injection Inject 15 Units under the skin Daily. 15 mL 0   • Insulin Pen Needle 29G X 12.7MM misc Use as directed with insulin three times daily with meals and at bedtime. 120 each 0   • isosorbide mononitrate (IMDUR) 30 MG  "24 hr tablet Take 30 mg by mouth daily.     • losartan (COZAAR) 100 MG tablet Take 1 tablet by mouth daily. (Patient taking differently: Take 100 mg by mouth 2 (Two) Times a Day. Is prescribed as 100 mg daily) 30 tablet 2   • lubiprostone (AMITIZA) 24 MCG capsule Take 1 capsule by mouth 2 (Two) Times a Day With Meals. 60 capsule 0   • metFORMIN (GLUCOPHAGE) 1000 MG tablet Take 1,000 mg by mouth Every Night.     • ondansetron ODT (ZOFRAN-ODT) 4 MG disintegrating tablet Take 1 tablet by mouth 4 (Four) Times a Day. 15 tablet 0   • oxyCODONE-acetaminophen (PERCOCET)  MG per tablet Take 1 tablet by mouth Every 4 (Four) Hours As Needed for Moderate Pain 12 tablet 0   • oxyCODONE-acetaminophen (PERCOCET)  MG per tablet Take 1 tablet by mouth Every 4 (Four) Hours As Needed for Moderate Pain . 12 tablet 0   • pantoprazole (PROTONIX) 40 MG EC tablet Take 40 mg by mouth daily.     • polyethylene glycol (MIRALAX) pack packet Mix 17 g with liquid of choice and drink   Daily. 500 g 0   • TRUEPLUS LANCETS 33G misc Use as directed to test blood sugar three times daily and and bedtime. 120 each 0   • venlafaxine (EFFEXOR) 100 MG tablet Take 100 mg by mouth 2 (Two) Times a Day.     • zolpidem (AMBIEN) 10 MG tablet Take 10 mg by mouth at night as needed.       No current facility-administered medications for this visit.        Allergies:  No Known Allergies    Vitals:  /64   Pulse 72   Temp 97.8 °F (36.6 °C)   Ht 182.9 cm (72.01\")   Wt 107 kg (235 lb)   SpO2 98%   BMI 31.86 kg/m²     Physical ExamNo acute distress vital signs stable soreness of the Rib cage on the right side and unable to take deep breath however lungs are clear heart is regular no clubbing cyanosis or edema    Imaging:Chest x-ray and CT reviewed and has a fracture of the right seventh rib and has small solid    Pleural effusion and pulmonary nodule of the right middle lobe a few millimeters in size    PFT:No PFT today Because of rib pain " however seems TO COPD   Results for orders placed or performed during the hospital encounter of 04/20/18   Urine Culture - Urine, Urine, Clean Catch   Result Value Ref Range    Urine Culture 40,000-50,000 CFU/mL Normal Urogenital Valeri    Comprehensive Metabolic Panel   Result Value Ref Range    Glucose 404 (H) 70 - 110 mg/dL    BUN 11 7 - 21 mg/dL    Creatinine 1.23 0.43 - 1.29 mg/dL    Sodium 128 (L) 135 - 153 mmol/L    Potassium 4.5 3.5 - 5.3 mmol/L    Chloride 100 99 - 112 mmol/L    CO2 23.6 (L) 24.3 - 31.9 mmol/L    Calcium 9.3 7.7 - 10.0 mg/dL    Total Protein 7.7 6.0 - 8.0 g/dL    Albumin 4.00 3.40 - 4.80 g/dL    ALT (SGPT) 21 10 - 44 U/L    AST (SGOT) 18 10 - 34 U/L    Alkaline Phosphatase 98 40 - 129 U/L    Total Bilirubin 0.2 0.2 - 1.8 mg/dL    eGFR Non African Amer 60 (L) >60 mL/min/1.73    Globulin 3.7 gm/dL    A/G Ratio 1.1 (L) 1.5 - 2.5 g/dL    BUN/Creatinine Ratio 8.9 7.0 - 25.0    Anion Gap 4.4 3.6 - 11.2 mmol/L   Protime-INR   Result Value Ref Range    Protime 13.3 11.0 - 15.4 Seconds    INR 1.00 0.90 - 1.10   aPTT   Result Value Ref Range    PTT 29.5 23.8 - 36.1 seconds   Urinalysis With / Culture If Indicated - Urine, Clean Catch   Result Value Ref Range    Color, UA Yellow Yellow, Straw    Appearance, UA Cloudy (A) Clear    pH, UA 6.0 5.0 - 8.0    Specific Gravity, UA 1.021 1.005 - 1.030    Glucose, UA >=1000 mg/dL (3+) (A) Negative    Ketones, UA Negative Negative    Bilirubin, UA Negative Negative    Blood, UA Trace (A) Negative    Protein, UA Negative Negative    Leuk Esterase, UA Moderate (2+) (A) Negative    Nitrite, UA Negative Negative    Urobilinogen, UA 0.2 E.U./dL 0.2 - 1.0 E.U./dL   CBC Auto Differential   Result Value Ref Range    WBC 11.79 4.50 - 12.50 10*3/mm3    RBC 4.77 4.70 - 6.10 10*6/mm3    Hemoglobin 10.3 (L) 14.0 - 18.0 g/dL    Hematocrit 33.2 (L) 42.0 - 52.0 %    MCV 69.6 (L) 80.0 - 94.0 fL    MCH 21.6 (L) 27.0 - 33.0 pg    MCHC 31.0 (L) 33.0 - 37.0 g/dL    RDW 22.7 (H) 11.5  - 14.5 %    RDW-SD 56.1 (H) 37.0 - 54.0 fl    MPV 10.0 6.0 - 10.0 fL    Platelets 418 (H) 130 - 400 10*3/mm3    Neutrophil % 61.1 30.0 - 70.0 %    Lymphocyte % 27.6 21.0 - 51.0 %    Monocyte % 8.5 0.0 - 10.0 %    Eosinophil % 2.1 0.0 - 5.0 %    Basophil % 0.3 0.0 - 2.0 %    Immature Grans % 0.4 0.0 - 0.5 %    Neutrophils, Absolute 7.21 (H) 1.40 - 6.50 10*3/mm3    Lymphocytes, Absolute 3.25 (H) 1.00 - 3.00 10*3/mm3    Monocytes, Absolute 1.00 (H) 0.10 - 0.90 10*3/mm3    Eosinophils, Absolute 0.25 0.00 - 0.70 10*3/mm3    Basophils, Absolute 0.03 0.00 - 0.30 10*3/mm3    Immature Grans, Absolute 0.05 (H) 0.00 - 0.03 10*3/mm3   Osmolality, Calculated   Result Value Ref Range    Osmolality Calc 273.5 273.0 - 305.0 mOsm/kg   Urinalysis, Microscopic Only - Urine, Clean Catch   Result Value Ref Range    RBC, UA 0-2 None Seen, 0-2 /HPF    WBC, UA 13-20 (A) None Seen, 0-2 /HPF    Bacteria, UA None Seen None Seen /HPF    Squamous Epithelial Cells, UA 0-2 None Seen, 0-2 /HPF    Hyaline Casts, UA None Seen None Seen /LPF    Methodology Manual Light Microscopy    Scan Slide   Result Value Ref Range    Anisocytosis Large/3+ None Seen    Hypochromia Slight/1+ None Seen    Microcytes Mod/2+ None Seen    Ovalocytes Slight/1+ None Seen    Platelet Morphology Normal Normal           ASSESSMENT AND DIAGNOSIS:1- Rib fractures due to his sleep and evolving from the share April 17-2- pulmonary nodule, 3 mm in size,  right middle lobe incidental finding On the CT3-COPD seemingly mild.4-coronary artery disease with history of myocardial infarction and Stenting  Recommendations strongly urged him to quit smoking.  Gave him a Prescription for NicoDerm patches to start.  Explained to him that the nodule that possibly is scarring With Could Be Early Lung Cancer and Short of Taking out the Cannot Tell Him What It Is Cancer      Follow-Up:Return in 3 months with a chest x-ray and PFT  may repeat the CAT scan and proceed accordingly, certainly in  case of increasing size or number needs further intervention

## 2018-09-04 ENCOUNTER — HOSPITAL ENCOUNTER (EMERGENCY)
Facility: HOSPITAL | Age: 63
Discharge: HOME | End: 2018-09-04
Attending: EMERGENCY MEDICINE | Admitting: EMERGENCY MEDICINE

## 2018-09-04 VITALS
WEIGHT: 240 LBS | DIASTOLIC BLOOD PRESSURE: 86 MMHG | BODY MASS INDEX: 32.51 KG/M2 | HEIGHT: 72 IN | OXYGEN SATURATION: 99 % | HEART RATE: 72 BPM | SYSTOLIC BLOOD PRESSURE: 152 MMHG | RESPIRATION RATE: 18 BRPM | TEMPERATURE: 98 F

## 2018-09-04 DIAGNOSIS — R73.9 HYPERGLYCEMIA: ICD-10-CM

## 2018-09-04 DIAGNOSIS — R06.02 SOB (SHORTNESS OF BREATH): Primary | ICD-10-CM

## 2018-09-04 DIAGNOSIS — E86.0 DEHYDRATION: Primary | ICD-10-CM

## 2018-09-04 LAB
ALBUMIN SERPL-MCNC: 3.8 G/DL (ref 3.4–4.8)
ALBUMIN/GLOB SERPL: 1.2 G/DL (ref 1.5–2.5)
ALP SERPL-CCNC: 100 U/L (ref 40–129)
ALT SERPL W P-5'-P-CCNC: 32 U/L (ref 10–44)
ANION GAP SERPL CALCULATED.3IONS-SCNC: 7.3 MMOL/L (ref 3.6–11.2)
AST SERPL-CCNC: 34 U/L (ref 10–34)
BACTERIA UR QL AUTO: ABNORMAL /HPF
BASOPHILS # BLD AUTO: 0.05 10*3/MM3 (ref 0–0.3)
BASOPHILS NFR BLD AUTO: 0.5 % (ref 0–2)
BILIRUB SERPL-MCNC: 0.3 MG/DL (ref 0.2–1.8)
BILIRUB UR QL STRIP: NEGATIVE
BNP SERPL-MCNC: 27 PG/ML (ref 0–100)
BUN BLD-MCNC: 8 MG/DL (ref 7–21)
BUN/CREAT SERPL: 6.1 (ref 7–25)
CALCIUM SPEC-SCNC: 8.8 MG/DL (ref 7.7–10)
CHLORIDE SERPL-SCNC: 100 MMOL/L (ref 99–112)
CLARITY UR: ABNORMAL
CO2 SERPL-SCNC: 20.7 MMOL/L (ref 24.3–31.9)
COLOR UR: YELLOW
CREAT BLD-MCNC: 1.31 MG/DL (ref 0.43–1.29)
DEPRECATED RDW RBC AUTO: 60.3 FL (ref 37–54)
EOSINOPHIL # BLD AUTO: 0.29 10*3/MM3 (ref 0–0.7)
EOSINOPHIL NFR BLD AUTO: 3 % (ref 0–5)
ERYTHROCYTE [DISTWIDTH] IN BLOOD BY AUTOMATED COUNT: 22.7 % (ref 11.5–14.5)
GFR SERPL CREATININE-BSD FRML MDRD: 55 ML/MIN/1.73
GLOBULIN UR ELPH-MCNC: 3.3 GM/DL
GLUCOSE BLD-MCNC: 402 MG/DL (ref 70–110)
GLUCOSE BLDC GLUCOMTR-MCNC: 278 MG/DL (ref 70–130)
GLUCOSE UR STRIP-MCNC: ABNORMAL MG/DL
HCT VFR BLD AUTO: 34.8 % (ref 42–52)
HGB BLD-MCNC: 11.3 G/DL (ref 14–18)
HGB UR QL STRIP.AUTO: ABNORMAL
HYALINE CASTS UR QL AUTO: ABNORMAL /LPF
IMM GRANULOCYTES # BLD: 0.02 10*3/MM3 (ref 0–0.03)
IMM GRANULOCYTES NFR BLD: 0.2 % (ref 0–0.5)
KETONES UR QL STRIP: NEGATIVE
LEUKOCYTE ESTERASE UR QL STRIP.AUTO: ABNORMAL
LYMPHOCYTES # BLD AUTO: 3.16 10*3/MM3 (ref 1–3)
LYMPHOCYTES NFR BLD AUTO: 32.6 % (ref 21–51)
MCH RBC QN AUTO: 24.5 PG (ref 27–33)
MCHC RBC AUTO-ENTMCNC: 32.5 G/DL (ref 33–37)
MCV RBC AUTO: 75.3 FL (ref 80–94)
MONOCYTES # BLD AUTO: 0.84 10*3/MM3 (ref 0.1–0.9)
MONOCYTES NFR BLD AUTO: 8.7 % (ref 0–10)
NEUTROPHILS # BLD AUTO: 5.33 10*3/MM3 (ref 1.4–6.5)
NEUTROPHILS NFR BLD AUTO: 55 % (ref 30–70)
NITRITE UR QL STRIP: NEGATIVE
OSMOLALITY SERPL CALC.SUM OF ELEC: 272.3 MOSM/KG (ref 273–305)
PH UR STRIP.AUTO: 6 [PH] (ref 5–8)
PLATELET # BLD AUTO: 293 10*3/MM3 (ref 130–400)
PMV BLD AUTO: 11.1 FL (ref 6–10)
POTASSIUM BLD-SCNC: 4 MMOL/L (ref 3.5–5.3)
PROT SERPL-MCNC: 7.1 G/DL (ref 6–8)
PROT UR QL STRIP: ABNORMAL
RBC # BLD AUTO: 4.62 10*6/MM3 (ref 4.7–6.1)
RBC # UR: ABNORMAL /HPF
REF LAB TEST METHOD: ABNORMAL
SODIUM BLD-SCNC: 128 MMOL/L (ref 135–153)
SP GR UR STRIP: 1.03 (ref 1–1.03)
SQUAMOUS #/AREA URNS HPF: ABNORMAL /HPF
TROPONIN I SERPL-MCNC: 0.01 NG/ML
UROBILINOGEN UR QL STRIP: ABNORMAL
WBC NRBC COR # BLD: 9.69 10*3/MM3 (ref 4.5–12.5)
WBC UR QL AUTO: ABNORMAL /HPF

## 2018-09-04 PROCEDURE — 80053 COMPREHEN METABOLIC PANEL: CPT | Performed by: EMERGENCY MEDICINE

## 2018-09-04 PROCEDURE — 82962 GLUCOSE BLOOD TEST: CPT

## 2018-09-04 PROCEDURE — 81001 URINALYSIS AUTO W/SCOPE: CPT | Performed by: EMERGENCY MEDICINE

## 2018-09-04 PROCEDURE — 85025 COMPLETE CBC W/AUTO DIFF WBC: CPT | Performed by: EMERGENCY MEDICINE

## 2018-09-04 PROCEDURE — 84484 ASSAY OF TROPONIN QUANT: CPT | Performed by: EMERGENCY MEDICINE

## 2018-09-04 PROCEDURE — 96360 HYDRATION IV INFUSION INIT: CPT

## 2018-09-04 PROCEDURE — 63710000001 INSULIN REGULAR HUMAN PER 5 UNITS: Performed by: EMERGENCY MEDICINE

## 2018-09-04 PROCEDURE — 99285 EMERGENCY DEPT VISIT HI MDM: CPT

## 2018-09-04 PROCEDURE — 83880 ASSAY OF NATRIURETIC PEPTIDE: CPT | Performed by: EMERGENCY MEDICINE

## 2018-09-04 RX ADMIN — HUMAN INSULIN 10 UNITS: 100 INJECTION, SOLUTION SUBCUTANEOUS at 19:35

## 2018-09-04 RX ADMIN — SODIUM CHLORIDE 500 ML: 9 INJECTION, SOLUTION INTRAVENOUS at 19:35

## 2018-09-04 RX ADMIN — SODIUM CHLORIDE 1000 ML: 9 INJECTION, SOLUTION INTRAVENOUS at 19:35

## 2018-09-04 NOTE — ED NOTES
Reminded pt need of urine sample at this time. Pt states he cannot provide sample, but has tried. Pt provided with water at this time.      Hortensia Brown  09/04/18 1958

## 2018-09-04 NOTE — ED NOTES
Pt states he went to his pcp today for a   UTI, pt states he had a near syncope episode while at the pcp and was sent to the ed via ambulance      Kiarra Pham RN  09/04/18 1942

## 2018-09-05 NOTE — ED NOTES
Report to CORIE Cruz from CORIE Brown, pt resting quietly, no complaints, pt appears drowsy but arouses to verbal stimuli     Zeny Rajan RN  09/04/18 2054

## 2018-09-05 NOTE — ED PROVIDER NOTES
Subjective   Patient presents to ER with weakness and dehydration.        Fatigue   Severity:  Mild  Onset quality:  Gradual  Duration:  8 hours  Progression:  Worsening  Chronicity:  Recurrent  Associated symptoms: fatigue and nausea        Review of Systems   Constitutional: Positive for appetite change and fatigue.   HENT: Negative.    Eyes: Negative.    Respiratory: Negative.    Cardiovascular: Negative.    Gastrointestinal: Positive for nausea.   Endocrine: Negative.    Genitourinary: Negative.    Musculoskeletal: Negative.    Skin: Negative.    Allergic/Immunologic: Negative.    Neurological: Negative.    Hematological: Negative.    Psychiatric/Behavioral: Negative.        Past Medical History:   Diagnosis Date   • Arthritis    • Bladder stones    • BPH (benign prostatic hyperplasia)    • Cancer (CMS/HCC)     skin ca- felix   • Claustrophobia    • COPD (chronic obstructive pulmonary disease) (CMS/HCC)    • Coronary artery disease    • Diabetes (CMS/HCC)    • Difficulty urinating    • Fracture, finger    • Hematuria    • High cholesterol    • History of sepsis    • Hypertension    • MI (myocardial infarction)    • Prostate disease    • Stroke (CMS/HCC)     X3 last one 9/2016   TIA   • TIA (transient ischemic attack)    • UTI (urinary tract infection)        No Known Allergies    Past Surgical History:   Procedure Laterality Date   • CARDIAC CATHETERIZATION  03/03/2014   • CARDIAC SURGERY     • CARDIOVASCULAR STRESS TEST  04/04/2013   • CATARACT EXTRACTION     • CEREBRAL ANEURYSM REPAIR     • COLONOSCOPY     • CORONARY ANGIOPLASTY WITH STENT PLACEMENT     • CYSTOSCOPY LITHOLAPAXY BLADDER STONE EXTRACTION N/A 11/29/2017    Procedure: CYSTOSCOPY LITHOLAPAXY BLADDER STONE EXTRACTION;  Surgeon: Flavio Barrios MD;  Location: [facility];  Service:    • ECHO - CONVERTED  02/02/2014   • EYE SURGERY     • INNER EAR SURGERY     • SKIN BIOPSY     • TONSILLECTOMY         Family History   Problem Relation Age of Onset    • Osteoporosis Mother    • Cancer Mother         breast and lung   • Cancer Father         small cell cancer, kidney   • Heart disease Father    • Diabetes Father    • No Known Problems Sister    • Aneurysm Brother        Social History     Social History   • Marital status: Single     Social History Main Topics   • Smoking status: Current Every Day Smoker     Packs/day: 1.00     Years: 45.00   • Smokeless tobacco: Never Used   • Alcohol use No   • Drug use: No   • Sexual activity: Defer     Other Topics Concern   • Not on file           Objective   Physical Exam   Constitutional: He is oriented to person, place, and time. He appears well-developed and well-nourished.   HENT:   Head: Normocephalic and atraumatic.   Eyes: Pupils are equal, round, and reactive to light. EOM are normal.   Neck: Normal range of motion.   Cardiovascular: Normal rate and regular rhythm.    Pulmonary/Chest: Effort normal and breath sounds normal.   Abdominal: Soft.   Musculoskeletal: Normal range of motion.   Neurological: He is oriented to person, place, and time.   Skin: Skin is warm.   Psychiatric: He has a normal mood and affect. His behavior is normal.   Nursing note and vitals reviewed.      Procedures           ED Course                  MDM      Final diagnoses:   Dehydration   Hyperglycemia            Cory Dacosta MD  09/04/18 2634

## 2018-09-05 NOTE — ED NOTES
Finger stick completed by me for glucose.  Results are 278mg/dL. ASHLEY Rahman RN, and Dr. Clifton aware. [place], [facility]  09/04/18 2791

## 2018-10-01 DIAGNOSIS — R06.02 SOB (SHORTNESS OF BREATH): Primary | ICD-10-CM

## 2018-10-23 ENCOUNTER — OFFICE VISIT (OUTPATIENT)
Dept: SURGERY | Facility: CLINIC | Age: 63
End: 2018-10-23

## 2018-10-23 VITALS
SYSTOLIC BLOOD PRESSURE: 146 MMHG | HEIGHT: 72 IN | HEART RATE: 107 BPM | WEIGHT: 240 LBS | BODY MASS INDEX: 32.51 KG/M2 | DIASTOLIC BLOOD PRESSURE: 86 MMHG

## 2018-10-23 DIAGNOSIS — L02.11 CUTANEOUS ABSCESS OF NECK: Primary | ICD-10-CM

## 2018-10-23 PROCEDURE — 87070 CULTURE OTHR SPECIMN AEROBIC: CPT | Performed by: SURGERY

## 2018-10-23 PROCEDURE — 87077 CULTURE AEROBIC IDENTIFY: CPT | Performed by: SURGERY

## 2018-10-23 PROCEDURE — 87205 SMEAR GRAM STAIN: CPT | Performed by: SURGERY

## 2018-10-23 PROCEDURE — 10060 I&D ABSCESS SIMPLE/SINGLE: CPT | Performed by: SURGERY

## 2018-10-23 PROCEDURE — 87186 SC STD MICRODIL/AGAR DIL: CPT | Performed by: SURGERY

## 2018-10-23 PROCEDURE — 99203 OFFICE O/P NEW LOW 30 MIN: CPT | Performed by: SURGERY

## 2018-10-23 PROCEDURE — 87147 CULTURE TYPE IMMUNOLOGIC: CPT | Performed by: SURGERY

## 2018-10-23 NOTE — PROGRESS NOTES
Mckayla Richter is a 63 y.o. male.     History of Present Illness He had a sore lump come up on his right lower neck 3 days ago. It is getting more sore and larger. No drainage. He has not started antibiotics yet.     The following portions of the patient's history were reviewed and updated as appropriate: current medications, past family history, past medical history, past social history, past surgical history and problem list.    Review of Systems   Constitutional: Negative for activity change, appetite change, chills, fever and unexpected weight change.   HENT: Negative for congestion, facial swelling and sore throat.    Eyes: Negative for photophobia and visual disturbance.   Respiratory: Negative for chest tightness, shortness of breath and wheezing.    Cardiovascular: Negative for chest pain, palpitations and leg swelling.   Gastrointestinal: Negative for abdominal distention, abdominal pain, anal bleeding, blood in stool, constipation, diarrhea, nausea, rectal pain and vomiting.   Endocrine: Negative for cold intolerance, heat intolerance, polydipsia and polyuria.   Genitourinary: Negative for difficulty urinating, dysuria, flank pain and urgency.   Musculoskeletal: Negative for back pain and myalgias.   Skin: Positive for color change. Negative for rash and wound.   Allergic/Immunologic: Negative for immunocompromised state.   Neurological: Negative for dizziness, seizures, syncope, light-headedness, numbness and headaches.   Hematological: Negative for adenopathy. Does not bruise/bleed easily.   Psychiatric/Behavioral: Negative for behavioral problems and confusion. The patient is not nervous/anxious.        Objective   Physical Exam   Constitutional: He is oriented to person, place, and time. He appears well-developed and well-nourished. He does not appear ill. No distress.       HENT:   Head: Normocephalic. Head is without laceration. Hair is normal.   Right Ear: Hearing and ear canal normal.    Left Ear: Hearing and ear canal normal.   Nose: Nose normal. No sinus tenderness. No epistaxis. Right sinus exhibits no maxillary sinus tenderness and no frontal sinus tenderness. Left sinus exhibits no maxillary sinus tenderness and no frontal sinus tenderness.   Eyes: Pupils are equal, round, and reactive to light. Conjunctivae and lids are normal.   Neck: Normal range of motion. No JVD present. No tracheal tenderness present. No tracheal deviation present. No thyroid mass and no thyromegaly present.   Cardiovascular: Normal rate and regular rhythm.  Exam reveals no gallop.    No murmur heard.  Pulmonary/Chest: Effort normal and breath sounds normal. No stridor. He has no wheezes. He exhibits no tenderness.   Abdominal: Soft. Bowel sounds are normal. He exhibits no distension, no ascites and no mass. There is no tenderness. There is no rebound and no guarding. No hernia.   Musculoskeletal: He exhibits no edema or deformity.   Lymphadenopathy:     He has no cervical adenopathy.     He has no axillary adenopathy.        Right: No inguinal and no supraclavicular adenopathy present.        Left: No inguinal and no supraclavicular adenopathy present.   Neurological: He is alert and oriented to person, place, and time. He exhibits normal muscle tone.   Skin: Skin is warm, dry and intact. No rash noted. There is erythema. No pallor.   Psychiatric: He has a normal mood and affect. His behavior is normal. Thought content normal.   Vitals reviewed.  I/D done on neck abscess. A culture was sent    Assessment/Plan   Jovanni was seen today for mass.    Diagnoses and all orders for this visit:    Cutaneous abscess of neck    return 1 wk. Start antiotics

## 2018-10-25 LAB
BACTERIA SPEC AEROBE CULT: ABNORMAL
GRAM STN SPEC: ABNORMAL
GRAM STN SPEC: ABNORMAL

## 2018-10-30 ENCOUNTER — OFFICE VISIT (OUTPATIENT)
Dept: SURGERY | Facility: CLINIC | Age: 63
End: 2018-10-30

## 2018-10-30 VITALS — WEIGHT: 240 LBS | BODY MASS INDEX: 32.51 KG/M2 | HEIGHT: 72 IN

## 2018-10-30 DIAGNOSIS — L02.11 CUTANEOUS ABSCESS OF NECK: Primary | ICD-10-CM

## 2018-10-30 PROCEDURE — 99024 POSTOP FOLLOW-UP VISIT: CPT | Performed by: SURGERY

## 2018-10-30 NOTE — PROGRESS NOTES
Subjective   Jovanni Richter is a 63 y.o. male.     History of Present Illness His neck wound is healed and no longer draining. He is still on the antibiotics.     The following portions of the patient's history were reviewed and updated as appropriate: current medications, past family history, past medical history, past social history, past surgical history and problem list.    Review of Systems    Objective   Physical Exam mild induration, no drainage    Assessment/Plan   Jovanni was seen today for follow-up.    Diagnoses and all orders for this visit:    Cutaneous abscess of neck    return prn, finish off antibiotics

## 2018-12-31 DIAGNOSIS — R06.02 SOB (SHORTNESS OF BREATH): Primary | ICD-10-CM

## 2019-01-02 ENCOUNTER — HOSPITAL ENCOUNTER (OUTPATIENT)
Dept: GENERAL RADIOLOGY | Facility: HOSPITAL | Age: 64
Discharge: HOME | End: 2019-01-02
Attending: INTERNAL MEDICINE | Admitting: INTERNAL MEDICINE

## 2019-01-02 ENCOUNTER — OFFICE VISIT (OUTPATIENT)
Dept: PULMONOLOGY | Facility: CLINIC | Age: 64
End: 2019-01-02

## 2019-01-02 VITALS
WEIGHT: 213 LBS | OXYGEN SATURATION: 96 % | HEIGHT: 70 IN | SYSTOLIC BLOOD PRESSURE: 138 MMHG | BODY MASS INDEX: 30.49 KG/M2 | TEMPERATURE: 98.2 F | HEART RATE: 95 BPM | DIASTOLIC BLOOD PRESSURE: 74 MMHG

## 2019-01-02 DIAGNOSIS — J44.9 CHRONIC OBSTRUCTIVE PULMONARY DISEASE, UNSPECIFIED COPD TYPE (HCC): ICD-10-CM

## 2019-01-02 DIAGNOSIS — R91.1 PULMONARY NODULE SEEN ON IMAGING STUDY: Primary | ICD-10-CM

## 2019-01-02 DIAGNOSIS — I25.10 CORONARY ARTERY DISEASE INVOLVING NATIVE HEART WITHOUT ANGINA PECTORIS, UNSPECIFIED VESSEL OR LESION TYPE: ICD-10-CM

## 2019-01-02 LAB
FEV1: 2.76 LITERS
FVC VOL RESPIRATORY: 3.23 LITERS

## 2019-01-02 PROCEDURE — 94010 BREATHING CAPACITY TEST: CPT | Performed by: INTERNAL MEDICINE

## 2019-01-02 PROCEDURE — 71046 X-RAY EXAM CHEST 2 VIEWS: CPT | Performed by: RADIOLOGY

## 2019-01-02 PROCEDURE — 99212 OFFICE O/P EST SF 10 MIN: CPT | Performed by: INTERNAL MEDICINE

## 2019-01-02 PROCEDURE — 71046 X-RAY EXAM CHEST 2 VIEWS: CPT

## 2019-01-02 RX ORDER — NICOTINE 21 MG/24HR
1 PATCH, TRANSDERMAL 24 HOURS TRANSDERMAL EVERY 24 HOURS
Qty: 30 PATCH | Refills: 0 | Status: SHIPPED | OUTPATIENT
Start: 2019-01-02 | End: 2019-06-25

## 2019-01-02 ASSESSMENT — PULMONARY FUNCTION TESTS
FEV1: 2.76
FVC: 3.23

## 2019-01-02 NOTE — PROGRESS NOTES
Subjective   Jovanni Richter is a 63 y.o. male.     History of Present Illness     {Common H&P Review Areas:99809}    Review of Systems   Respiratory: Positive for cough and shortness of breath.        Objective   Physical Exam      Assessment/Plan   {Assess/PlanSmartLinks:81773}

## 2019-01-02 NOTE — PROGRESS NOTES
Subjective   Chief Complaint   Patient presents with   • Lung Nodule       Jovanni Richter is a 63 y.o. male     History of Present Illness 63-year-old gentleman that was seen May 1718 because of the pulmonary nodule 3 mm in size incidental finding on CT chest at that was taken after a fall and accident was advised to return to us in 2 months however he could not afford the Co-pay until used smoker 1 pack a day that he has done all his adult life in spite of having a heart attack and stenting also gives history of having Mini strokes in the past and has gait impairment because of severe Peripheral neuropathy .  Being followed by Neurology and cardiology    Review of Systems continued cigarette cough some shortness of breath on exertion but no chest pain or weight loss    Family History   Problem Relation Age of Onset   • Osteoporosis Mother    • Cancer Mother         breast and lung   • Cancer Father         small cell cancer, kidney   • Heart disease Father    • Diabetes Father    • No Known Problems Sister    • Aneurysm Brother        Past Medical History:   Diagnosis Date   • Arthritis    • Bladder stones    • BPH (benign prostatic hyperplasia)    • Cancer (CMS/HCC)     skin ca- felix   • Claustrophobia    • COPD (chronic obstructive pulmonary disease) (CMS/HCC)    • Coronary artery disease    • Diabetes (CMS/HCC)    • Difficulty urinating    • Fracture, finger    • Hematuria    • High cholesterol    • History of sepsis    • Hypertension    • MI (myocardial infarction) (CMS/HCC)    • Prostate disease    • Stroke (CMS/HCC)     X3 last one 9/2016   TIA   • TIA (transient ischemic attack)    • UTI (urinary tract infection)        Past Surgical History:   Procedure Laterality Date   • CARDIAC CATHETERIZATION  03/03/2014   • CARDIAC SURGERY     • CARDIOVASCULAR STRESS TEST  04/04/2013   • CATARACT EXTRACTION     • CEREBRAL ANEURYSM REPAIR     • COLONOSCOPY     • CORONARY ANGIOPLASTY WITH STENT PLACEMENT     • CYSTOSCOPY  LITHOLAPAXY BLADDER STONE EXTRACTION N/A 11/29/2017    Procedure: CYSTOSCOPY LITHOLAPAXY BLADDER STONE EXTRACTION;  Surgeon: Flavio Barrios MD;  Location: [facility];  Service:    • ECHO - CONVERTED  02/02/2014   • EYE SURGERY     • INNER EAR SURGERY     • SKIN BIOPSY     • TONSILLECTOMY         Social History     Socioeconomic History   • Marital status: Single     Spouse name: Not on file   • Number of children: Not on file   • Years of education: Not on file   • Highest education level: Not on file   Social Needs   • Financial resource strain: Not on file   • Food insecurity - worry: Not on file   • Food insecurity - inability: Not on file   • Transportation needs - medical: Not on file   • Transportation needs - non-medical: Not on file   Occupational History   • Not on file   Tobacco Use   • Smoking status: Current Every Day Smoker     Packs/day: 1.00     Years: 45.00     Pack years: 45.00   • Smokeless tobacco: Never Used   Substance and Sexual Activity   • Alcohol use: No   • Drug use: No   • Sexual activity: Defer     Birth control/protection: None   Other Topics Concern   • Not on file   Social History Narrative   • Not on file        Physical Exam: No acute distress weight 212 pounds for height of 70 inches blood pressure 138/74 O2 sat 96% on room air no gross neurologic Deficit heart is regular and lungs minimal expiratory Rhonchi no clubbing cyanosis edema or joint deformity    PFT: FVC 69 FEV1 79% question of effort or mild restrictive impairment    Imaging: Chest x-ray Today is clear does not show any nodules of her CT chest of April 20, 2018 reported a 3 mm nodule in the right middle lobe area    Other Labs:       ASSESSMENT1-COPD seemingly mild in spite of that continued smoking2-pulmonary nodules incidental finding on CT chest of April 20, 2018 when he had a fall and has some retractors        Recommendations: Strongly urged him to quit smoking suggested NicoDerm patches explained to them  that small nodule that he had to quit be Slow-growing Lung cancer and he is at risk of getting lung cancer and deserves having Abnormal CT chest    Follow up: Return in 3 months chest x-ray and PFT and it will be time for his abnormal CT chest for lung cancer screening

## 2019-01-29 ENCOUNTER — APPOINTMENT (OUTPATIENT)
Dept: CT IMAGING | Facility: HOSPITAL | Age: 64
End: 2019-01-29

## 2019-01-29 ENCOUNTER — HOSPITAL ENCOUNTER (EMERGENCY)
Facility: HOSPITAL | Age: 64
Discharge: HOME | End: 2019-01-29
Attending: EMERGENCY MEDICINE | Admitting: EMERGENCY MEDICINE

## 2019-01-29 VITALS
OXYGEN SATURATION: 98 % | SYSTOLIC BLOOD PRESSURE: 166 MMHG | DIASTOLIC BLOOD PRESSURE: 78 MMHG | BODY MASS INDEX: 31.83 KG/M2 | HEART RATE: 84 BPM | RESPIRATION RATE: 18 BRPM | WEIGHT: 235 LBS | HEIGHT: 72 IN | TEMPERATURE: 98.5 F

## 2019-01-29 DIAGNOSIS — S20.212A RIB CONTUSION, LEFT, INITIAL ENCOUNTER: Primary | ICD-10-CM

## 2019-01-29 LAB
ANION GAP SERPL CALCULATED.3IONS-SCNC: 4.8 MMOL/L (ref 3.6–11.2)
BASOPHILS # BLD AUTO: 0.04 10*3/MM3 (ref 0–0.3)
BASOPHILS NFR BLD AUTO: 0.4 % (ref 0–2)
BUN BLD-MCNC: 9 MG/DL (ref 7–21)
BUN/CREAT SERPL: 8 (ref 7–25)
CALCIUM SPEC-SCNC: 9.6 MG/DL (ref 7.7–10)
CHLORIDE SERPL-SCNC: 100 MMOL/L (ref 99–112)
CO2 SERPL-SCNC: 28.2 MMOL/L (ref 24.3–31.9)
CREAT BLD-MCNC: 1.12 MG/DL (ref 0.43–1.29)
DEPRECATED RDW RBC AUTO: 50.5 FL (ref 37–54)
EOSINOPHIL # BLD AUTO: 0.27 10*3/MM3 (ref 0–0.7)
EOSINOPHIL NFR BLD AUTO: 2.7 % (ref 0–5)
ERYTHROCYTE [DISTWIDTH] IN BLOOD BY AUTOMATED COUNT: 16.7 % (ref 11.5–14.5)
GFR SERPL CREATININE-BSD FRML MDRD: 66 ML/MIN/1.73
GLUCOSE BLD-MCNC: 382 MG/DL (ref 70–110)
HCT VFR BLD AUTO: 41.5 % (ref 42–52)
HGB BLD-MCNC: 13.5 G/DL (ref 14–18)
IMM GRANULOCYTES # BLD AUTO: 0.03 10*3/MM3 (ref 0–0.03)
IMM GRANULOCYTES NFR BLD AUTO: 0.3 % (ref 0–0.5)
LYMPHOCYTES # BLD AUTO: 3.47 10*3/MM3 (ref 1–3)
LYMPHOCYTES NFR BLD AUTO: 35.3 % (ref 21–51)
MCH RBC QN AUTO: 26.8 PG (ref 27–33)
MCHC RBC AUTO-ENTMCNC: 32.5 G/DL (ref 33–37)
MCV RBC AUTO: 82.3 FL (ref 80–94)
MONOCYTES # BLD AUTO: 1.02 10*3/MM3 (ref 0.1–0.9)
MONOCYTES NFR BLD AUTO: 10.4 % (ref 0–10)
NEUTROPHILS # BLD AUTO: 5.01 10*3/MM3 (ref 1.4–6.5)
NEUTROPHILS NFR BLD AUTO: 50.9 % (ref 30–70)
OSMOLALITY SERPL CALC.SUM OF ELEC: 280.8 MOSM/KG (ref 273–305)
PLATELET # BLD AUTO: 252 10*3/MM3 (ref 130–400)
PMV BLD AUTO: 11.4 FL (ref 6–10)
POTASSIUM BLD-SCNC: 3.9 MMOL/L (ref 3.5–5.3)
RBC # BLD AUTO: 5.04 10*6/MM3 (ref 4.7–6.1)
SODIUM BLD-SCNC: 133 MMOL/L (ref 135–153)
TROPONIN I SERPL-MCNC: 0.02 NG/ML
WBC NRBC COR # BLD: 9.84 10*3/MM3 (ref 4.5–12.5)

## 2019-01-29 PROCEDURE — 80048 BASIC METABOLIC PNL TOTAL CA: CPT | Performed by: EMERGENCY MEDICINE

## 2019-01-29 PROCEDURE — 93005 ELECTROCARDIOGRAM TRACING: CPT | Performed by: EMERGENCY MEDICINE

## 2019-01-29 PROCEDURE — 96374 THER/PROPH/DIAG INJ IV PUSH: CPT

## 2019-01-29 PROCEDURE — 84484 ASSAY OF TROPONIN QUANT: CPT | Performed by: EMERGENCY MEDICINE

## 2019-01-29 PROCEDURE — 71250 CT THORAX DX C-: CPT | Performed by: RADIOLOGY

## 2019-01-29 PROCEDURE — 25010000002 MORPHINE PER 10 MG: Performed by: EMERGENCY MEDICINE

## 2019-01-29 PROCEDURE — 85025 COMPLETE CBC W/AUTO DIFF WBC: CPT | Performed by: EMERGENCY MEDICINE

## 2019-01-29 PROCEDURE — 71250 CT THORAX DX C-: CPT

## 2019-01-29 PROCEDURE — 93010 ELECTROCARDIOGRAM REPORT: CPT | Performed by: INTERNAL MEDICINE

## 2019-01-29 PROCEDURE — 99283 EMERGENCY DEPT VISIT LOW MDM: CPT

## 2019-01-29 RX ORDER — MORPHINE SULFATE 10 MG/ML
4 INJECTION INTRAMUSCULAR; INTRAVENOUS; SUBCUTANEOUS ONCE
Status: COMPLETED | OUTPATIENT
Start: 2019-01-29 | End: 2019-01-29

## 2019-01-29 RX ORDER — SODIUM CHLORIDE 0.9 % (FLUSH) 0.9 %
10 SYRINGE (ML) INJECTION AS NEEDED
Status: DISCONTINUED | OUTPATIENT
Start: 2019-01-29 | End: 2019-01-29 | Stop reason: HOSPADM

## 2019-01-29 RX ADMIN — MORPHINE SULFATE 4 MG: 10 INJECTION INTRAVENOUS at 19:22

## 2019-03-26 DIAGNOSIS — R06.02 SOB (SHORTNESS OF BREATH): Primary | ICD-10-CM

## 2019-05-13 DIAGNOSIS — R06.02 SOB (SHORTNESS OF BREATH): Primary | ICD-10-CM

## 2019-06-25 ENCOUNTER — OFFICE VISIT (OUTPATIENT)
Dept: UROLOGY | Facility: CLINIC | Age: 64
End: 2019-06-25

## 2019-06-25 VITALS
DIASTOLIC BLOOD PRESSURE: 82 MMHG | BODY MASS INDEX: 31.56 KG/M2 | SYSTOLIC BLOOD PRESSURE: 140 MMHG | TEMPERATURE: 98.1 F | HEIGHT: 72 IN | WEIGHT: 233 LBS

## 2019-06-25 DIAGNOSIS — R35.0 BENIGN PROSTATIC HYPERPLASIA WITH URINARY FREQUENCY: Primary | ICD-10-CM

## 2019-06-25 DIAGNOSIS — N40.1 BENIGN PROSTATIC HYPERPLASIA WITH URINARY FREQUENCY: Primary | ICD-10-CM

## 2019-06-25 LAB
BILIRUB BLD-MCNC: NEGATIVE MG/DL
CLARITY, POC: CLEAR
COLOR UR: YELLOW
GLUCOSE UR STRIP-MCNC: NEGATIVE MG/DL
KETONES UR QL: NEGATIVE
LEUKOCYTE EST, POC: ABNORMAL
NITRITE UR-MCNC: NEGATIVE MG/ML
PH UR: 6.5 [PH] (ref 5–8)
PROT UR STRIP-MCNC: NEGATIVE MG/DL
PSA SERPL-MCNC: 2.5 NG/ML (ref 0–4)
RBC # UR STRIP: NEGATIVE /UL
SP GR UR: 1.01 (ref 1–1.03)
UROBILINOGEN UR QL: NORMAL

## 2019-06-25 PROCEDURE — 36415 COLL VENOUS BLD VENIPUNCTURE: CPT | Performed by: UROLOGY

## 2019-06-25 PROCEDURE — 81003 URINALYSIS AUTO W/O SCOPE: CPT | Performed by: UROLOGY

## 2019-06-25 PROCEDURE — 51798 US URINE CAPACITY MEASURE: CPT | Performed by: UROLOGY

## 2019-06-25 PROCEDURE — 99213 OFFICE O/P EST LOW 20 MIN: CPT | Performed by: UROLOGY

## 2019-06-25 RX ORDER — TAMSULOSIN HYDROCHLORIDE 0.4 MG/1
1 CAPSULE ORAL NIGHTLY
Qty: 30 CAPSULE | Refills: 5 | Status: SHIPPED | OUTPATIENT
Start: 2019-06-25 | End: 2020-01-02

## 2019-06-25 RX ORDER — FINASTERIDE 5 MG/1
5 TABLET, FILM COATED ORAL DAILY
Qty: 30 TABLET | Refills: 5 | Status: SHIPPED | OUTPATIENT
Start: 2019-06-25 | End: 2021-06-01 | Stop reason: SDUPTHER

## 2019-06-25 RX ORDER — GLIMEPIRIDE 4 MG/1
4 TABLET ORAL
COMMUNITY
End: 2021-08-13

## 2019-06-25 RX ORDER — ASPIRIN 325 MG
325 TABLET, DELAYED RELEASE (ENTERIC COATED) ORAL EVERY 6 HOURS PRN
COMMUNITY

## 2019-06-25 NOTE — PROGRESS NOTES
Chief Complaint:          Chief Complaint   Patient presents with   • Urinary Incontinence     and nocturia       HPI:   64 y.o. male previously a patient here and saw Ginna in 2014 with BPH placed on doxazosin.  Apparently on Sunday started with significant pain and strangury as well as gross hematuria presents today clot retention having significant pain.  No fevers or chills.  He is in significant pain he is on Plavix for heart disease and stents.  I went ahead and anchored a catheter to gravity drainage and irrigated 5 L of fluid with a large amount of clot and cessation of the blood I told him to see the prostate bleeding or bladder tumor.  He has no flank pain or upper tract symptomatology whatsoever.  I left a 22 Sinhala catheter gravity drainage its clear him gentamicin and have a CBC and a BMP pending at the time of this dictation.  He had she was admitted the night after the above aforementioned visit.  In fact he was found to have massive BPH and bladder stones as the probable etiology of his symptomatology.  He actually did well after release.  He voided 500 cc after his catheter occluded knee pain now presents for cystoscopy.  Cystoscopy showed a large amount of clot I'm going to set him up for cystoscopy clot evacuation cystoscopy litholapaxy and follow-up based on this on Wednesday morning.  He is maximized with regard to his medical therapy.  He still has poorly controlled diabetes and significant heart disease but he is off the anticoagulant.           He returns today he's doing fantastic he has a great stream he gets up every 2 hours at night most likely secondary to his poorly controlled diabetes he has no pain with urination he has no bleeding he is a solid stream.  I told him that's okay to restart his anticoagulation but he needs to check with his cardiologist.  Overall is done extremely well.  He has no significant prostatic obstruction and I expect him to continue on finasteride and doing  extremely well and check him back in 6 months with a PSA    Interesting gentleman who returns today.  He had been on for blocker and finasteride.  He switched to taking the alpha-blocker only once a week and went off the finasteride completely and now is wetting wearing depends he thought it was from the medicine but I explained actually the medicine was preventing it he has 3+ leukocytes in the urine probably was secondary to irritation a postvoid residual of 89 cc he has no allergies he has urge urge incontinence.  With BPH he has greater than a 50 g prostate to palpation.  I am going to reinitiate therapy and set him up for follow-up.  I explained to him extensively the purpose of these medications as to prevent the problem rather than the other way around    Past Medical History:        Past Medical History:   Diagnosis Date   • Arthritis    • Bladder stones    • BPH (benign prostatic hyperplasia)    • Cancer (CMS/[facility])     skin ca- felix   • Claustrophobia    • COPD (chronic obstructive pulmonary disease) (CMS/[facility])    • Coronary artery disease    • Diabetes (CMS/[facility])    • Difficulty urinating    • Fracture, finger    • Hematuria    • High cholesterol    • History of sepsis    • Hypertension    • MI (myocardial infarction) (CMS/[facility])    • Prostate disease    • Stroke (CMS/[facility])     X3 last one 9/2016   TIA   • TIA (transient ischemic attack)    • UTI (urinary tract infection)          Current Meds:     Current Outpatient Medications   Medication Sig Dispense Refill   • albuterol (PROVENTIL HFA;VENTOLIN HFA) 108 (90 Base) MCG/ACT inhaler Inhale 2-4 puffs Every 4 (Four) Hours As Needed for Wheezing or Shortness of Air. 1 inhaler 0   • atenolol (TENORMIN) 25 MG tablet Take 25 mg by mouth daily.     • atorvastatin (LIPITOR) 40 MG tablet Take 40 mg by mouth daily.     • Blood Glucose Monitoring Suppl (ACCU-CHEK JOSE G) device Use as directed to test blood sugar three times daily and and bedtime. 1 each 0   • ciprofloxacin  (CIPRO) 500 MG tablet Take 1 tablet by mouth 2 (Two) Times a Day. 20 tablet 0   • Clopidogrel Bisulfate (PLAVIX PO) Take 75 mg by mouth Daily.     • doxazosin (CARDURA) 8 MG tablet Take 8 mg by mouth Every Morning.     • finasteride (PROSCAR) 5 MG tablet Take 1 tablet by mouth Daily. 30 tablet 0   • gabapentin (NEURONTIN) 400 MG capsule Take 400 mg by mouth 2 (two) times a day.     • glimepiride (AMARYL) 4 MG tablet Take 4 mg by mouth daily.     • glucose blood test strip Use as directed to test blood sugar three times daily and and bedtime. 120 each 0   • insulin aspart (novoLOG FLEXPEN) 100 UNIT/ML solution pen-injector sc pen Use as directed 4 times daily per sliding scale (0 to 7 units/dose). (Patient taking differently: 2 (Two) Times a Day.) 15 mL 0   • insulin detemir (LEVEMIR) 100 UNIT/ML injection Inject 15 Units under the skin Daily. 15 mL 0   • Insulin Pen Needle 29G X 12.7MM misc Use as directed with insulin three times daily with meals and at bedtime. 120 each 0   • isosorbide mononitrate (IMDUR) 30 MG 24 hr tablet Take 30 mg by mouth daily.     • losartan (COZAAR) 100 MG tablet Take 1 tablet by mouth daily. (Patient taking differently: Take 100 mg by mouth 2 (Two) Times a Day. Is prescribed as 100 mg daily) 30 tablet 2   • lubiprostone (AMITIZA) 24 MCG capsule Take 1 capsule by mouth 2 (Two) Times a Day With Meals. 60 capsule 0   • metFORMIN (GLUCOPHAGE) 1000 MG tablet Take 1,000 mg by mouth Every Night.     • nicotine (NICODERM CQ) 21 MG/24HR patch Place 1 patch on the skin as directed by provider Daily. 30 patch 0   • ondansetron ODT (ZOFRAN-ODT) 4 MG disintegrating tablet Take 1 tablet by mouth 4 (Four) Times a Day. 15 tablet 0   • oxyCODONE-acetaminophen (PERCOCET)  MG per tablet Take 1 tablet by mouth Every 4 (Four) Hours As Needed for Moderate Pain 12 tablet 0   • oxyCODONE-acetaminophen (PERCOCET)  MG per tablet Take 1 tablet by mouth Every 4 (Four) Hours As Needed for Moderate Pain .  12 tablet 0   • pantoprazole (PROTONIX) 40 MG EC tablet Take 40 mg by mouth daily.     • polyethylene glycol (MIRALAX) pack packet Mix 17 g with liquid of choice and drink   Daily. 500 g 0   • TRUEPLUS LANCETS 33G misc Use as directed to test blood sugar three times daily and and bedtime. 120 each 0   • venlafaxine (EFFEXOR) 100 MG tablet Take 100 mg by mouth 2 (Two) Times a Day.     • zolpidem (AMBIEN) 10 MG tablet Take 10 mg by mouth at night as needed.       No current facility-administered medications for this visit.         Allergies:      No Known Allergies     Past Surgical History:     Past Surgical History:   Procedure Laterality Date   • CARDIAC CATHETERIZATION  03/03/2014   • CARDIAC SURGERY     • CARDIOVASCULAR STRESS TEST  04/04/2013   • CATARACT EXTRACTION     • CEREBRAL ANEURYSM REPAIR     • COLONOSCOPY     • CORONARY ANGIOPLASTY WITH STENT PLACEMENT     • CYSTOSCOPY LITHOLAPAXY BLADDER STONE EXTRACTION N/A 11/29/2017    Procedure: CYSTOSCOPY LITHOLAPAXY BLADDER STONE EXTRACTION;  Surgeon: Flavio Barrios MD;  Location: [facility];  Service:    • ECHO - CONVERTED  02/02/2014   • EYE SURGERY     • INNER EAR SURGERY     • SKIN BIOPSY     • TONSILLECTOMY           Social History:     Social History     Socioeconomic History   • Marital status: Single     Spouse name: Not on file   • Number of children: Not on file   • Years of education: Not on file   • Highest education level: Not on file   Tobacco Use   • Smoking status: Current Every Day Smoker     Packs/day: 1.00     Years: 45.00     Pack years: 45.00   • Smokeless tobacco: Never Used   Substance and Sexual Activity   • Alcohol use: No   • Drug use: No   • Sexual activity: Defer     Birth control/protection: None       Family History:     Family History   Problem Relation Age of Onset   • Osteoporosis Mother    • Cancer Mother         breast and lung   • Cancer Father         small cell cancer, kidney   • Heart disease Father    • Diabetes  Father    • No Known Problems Sister    • Aneurysm Brother        Review of Systems:     Review of Systems   Constitutional: Negative.    HENT: Negative.    Eyes: Negative.    Respiratory: Negative.    Cardiovascular: Negative.    Gastrointestinal: Negative.    Endocrine: Negative.    Genitourinary: Positive for difficulty urinating, frequency and urgency.   Musculoskeletal: Negative.    Allergic/Immunologic: Negative.    Neurological: Negative.    Hematological: Negative.    Psychiatric/Behavioral: Negative.        Physical Exam:     Physical Exam   Constitutional: He is oriented to person, place, and time. He appears well-developed and well-nourished.   HENT:   Head: Normocephalic and atraumatic.   Eyes: Conjunctivae and EOM are normal. Pupils are equal, round, and reactive to light.   Neck: Normal range of motion.   Cardiovascular: Normal rate, regular rhythm, normal heart sounds and intact distal pulses.   Pulmonary/Chest: Effort normal and breath sounds normal.   Abdominal: Soft. Bowel sounds are normal.   Genitourinary:   Genitourinary Comments: Soft nontender abdomen with no organomegaly, rigidity, or tenderness.  He has normal external genitalia and uncircumcised phallus with a freely movable foreskin bilaterally descended testes without masses there is no inguinal hernias adenopathy or abnormalities he had good rectal tone and a large smooth firm prostate.  There is no nodularity or any suspicious rectal abnormalities     Musculoskeletal: Normal range of motion.   Neurological: He is alert and oriented to person, place, and time. He has normal reflexes.   Skin: Skin is warm and dry.   Psychiatric: He has a normal mood and affect. His behavior is normal. Judgment and thought content normal.   Nursing note and vitals reviewed.      I have reviewed the following portions of the patient's history: allergies, current medications, past family history, past medical history, past social history, past surgical  history, problem list and ROS and confirm it's accurate.      Procedure:       Assessment/Plan:   BPH: Discussed the pathophysiology of BPH and obstruction.  We discussed the static and dynamic effect effects of BPH as well as using 5 alpha reductase inhibitors versus alpha blockade.  We discussed the indications for transurethral surgery as well.  And/ or other therapeutic options available including all of the newer techniques.  Please note the above history I restarted both Flomax and finasteride  PSA testing-I am recommending a PSA blood test that stands for prostate specific antigen.  I discussed the pathophysiology of PSA testing indicating its use in the diagnosis and management of prostate cancer.  I discussed the normal range being 0-4 but more appropriately being much closer to 0-2 in a normal male.  I discussed the fact that after certain age we don't recommend PSA testing especially in view of numerous comorbidities.  That this will not be a useful test.  I discussed many of the things that can artificially raised PSA including a recent infection, urinary tract infection, recent sexual intercourse.  Where even the type of movement such as manipulation of the prostate  riding a bicycle.  After all this is taken into account when the test is reviewed  the most important use of PSA which is the velocity measurement in other words the change of PSA with time as a very important factor in the use and that we look for greater than 20% rise over a year to help us make the prediction of prostate cancer.  I also discussed that the use with prostate cancer indicating that after a radical prostatectomy the PSA should be 0 and any rise indicates an early biochemical recurrence            Patient's There is no height or weight on file to calculate BMI. BMI is above normal parameters. Recommendations include: educational material.              This document has been electronically signed by TRACE BELLO MD  June 25, 2019 8:18 AM

## 2019-06-27 LAB
BACTERIA UR CULT: NO GROWTH
BACTERIA UR CULT: NORMAL

## 2019-07-25 ENCOUNTER — OFFICE VISIT (OUTPATIENT)
Dept: UROLOGY | Facility: CLINIC | Age: 64
End: 2019-07-25

## 2019-07-25 VITALS
WEIGHT: 236.2 LBS | SYSTOLIC BLOOD PRESSURE: 150 MMHG | DIASTOLIC BLOOD PRESSURE: 80 MMHG | BODY MASS INDEX: 31.99 KG/M2 | HEIGHT: 72 IN

## 2019-07-25 DIAGNOSIS — N21.0 BLADDER STONES: Primary | ICD-10-CM

## 2019-07-25 PROCEDURE — 99213 OFFICE O/P EST LOW 20 MIN: CPT | Performed by: UROLOGY

## 2019-07-25 NOTE — PROGRESS NOTES
Chief Complaint:          Chief Complaint   Patient presents with   • Nocturia     1 month f/u       HPI:   64 y.o. male with a history of bladder stones returns today he is still passing stones I am to set him up for cystoscopy ASAP his urine culture is negative his PSA is 2.5.      Past Medical History:        Past Medical History:   Diagnosis Date   • Arthritis    • Bladder stones    • BPH (benign prostatic hyperplasia)    • Cancer (CMS/[facility])     skin ca- felix   • Claustrophobia    • COPD (chronic obstructive pulmonary disease) (CMS/[facility])    • Coronary artery disease    • Diabetes (CMS/[facility])    • Difficulty urinating    • Fracture, finger    • Hematuria    • High cholesterol    • History of sepsis    • Hypertension    • MI (myocardial infarction) (CMS/[facility])    • Prostate disease    • Stroke (CMS/[facility])     X3 last one 9/2016   TIA   • TIA (transient ischemic attack)    • UTI (urinary tract infection)          Current Meds:     Current Outpatient Medications   Medication Sig Dispense Refill   • aspirin  MG tablet Take 325 mg by mouth Every 6 (Six) Hours As Needed.     • Blood Glucose Monitoring Suppl (ACCU-CHEK JOSE G) device Use as directed to test blood sugar three times daily and and bedtime. 1 each 0   • doxazosin (CARDURA) 8 MG tablet Take 8 mg by mouth Every Morning.     • finasteride (PROSCAR) 5 MG tablet Take 1 tablet by mouth Daily. 30 tablet 0   • finasteride (PROSCAR) 5 MG tablet Take 1 tablet by mouth Daily. 30 tablet 5   • gabapentin (NEURONTIN) 400 MG capsule Take 400 mg by mouth 2 (two) times a day.     • glimepiride (AMARYL) 4 MG tablet Take 4 mg by mouth Every Morning Before Breakfast.     • glucose blood test strip Use as directed to test blood sugar three times daily and and bedtime. 120 each 0   • insulin aspart (novoLOG FLEXPEN) 100 UNIT/ML solution pen-injector sc pen Use as directed 4 times daily per sliding scale (0 to 7 units/dose). (Patient taking differently: 2 (Two) Times a Day.) 15 mL 0    • insulin detemir (LEVEMIR) 100 UNIT/ML injection Inject 15 Units under the skin Daily. 15 mL 0   • Insulin Pen Needle 29G X 12.7MM misc Use as directed with insulin three times daily with meals and at bedtime. 120 each 0   • metFORMIN (GLUCOPHAGE) 1000 MG tablet Take 1,000 mg by mouth Every Night.     • pantoprazole (PROTONIX) 40 MG EC tablet Take 40 mg by mouth daily.     • polyethylene glycol (MIRALAX) pack packet Mix 17 g with liquid of choice and drink   Daily. 500 g 0   • tamsulosin (FLOMAX) 0.4 MG capsule 24 hr capsule Take 1 capsule by mouth Every Night. 30 capsule 5   • TRUEPLUS LANCETS 33G misc Use as directed to test blood sugar three times daily and and bedtime. 120 each 0   • zolpidem (AMBIEN) 10 MG tablet Take 10 mg by mouth at night as needed.     • Clopidogrel Bisulfate (PLAVIX PO) Take 75 mg by mouth Daily.       No current facility-administered medications for this visit.         Allergies:      No Known Allergies     Past Surgical History:     Past Surgical History:   Procedure Laterality Date   • CARDIAC CATHETERIZATION  03/03/2014   • CARDIAC SURGERY     • CARDIOVASCULAR STRESS TEST  04/04/2013   • CATARACT EXTRACTION     • CEREBRAL ANEURYSM REPAIR     • COLONOSCOPY     • CORONARY ANGIOPLASTY WITH STENT PLACEMENT     • CYSTOSCOPY LITHOLAPAXY BLADDER STONE EXTRACTION N/A 11/29/2017    Procedure: CYSTOSCOPY LITHOLAPAXY BLADDER STONE EXTRACTION;  Surgeon: Flavio Barrios MD;  Location: [facility];  Service:    • ECHO - CONVERTED  02/02/2014   • EYE SURGERY     • INNER EAR SURGERY     • SKIN BIOPSY     • TONSILLECTOMY           Social History:     Social History     Socioeconomic History   • Marital status: Single     Spouse name: Not on file   • Number of children: Not on file   • Years of education: Not on file   • Highest education level: Not on file   Tobacco Use   • Smoking status: Current Every Day Smoker     Packs/day: 1.00     Years: 45.00     Pack years: 45.00   • Smokeless tobacco:  Never Used   Substance and Sexual Activity   • Alcohol use: No   • Drug use: No   • Sexual activity: Defer     Birth control/protection: None       Family History:     Family History   Problem Relation Age of Onset   • Osteoporosis Mother    • Cancer Mother         breast and lung   • Cancer Father         small cell cancer, kidney   • Heart disease Father    • Diabetes Father    • No Known Problems Sister    • Aneurysm Brother        Review of Systems:     Review of Systems   Constitutional: Negative.    HENT: Negative.    Eyes: Negative.    Respiratory: Negative.    Cardiovascular: Negative.    Gastrointestinal: Negative.    Endocrine: Negative.    Genitourinary: Positive for frequency and urgency.   Musculoskeletal: Negative.    Allergic/Immunologic: Negative.    Neurological: Negative.    Hematological: Negative.    Psychiatric/Behavioral: Negative.        Physical Exam:     Physical Exam   Constitutional: He is oriented to person, place, and time. He appears well-developed and well-nourished.   HENT:   Head: Normocephalic and atraumatic.   Eyes: Conjunctivae and EOM are normal. Pupils are equal, round, and reactive to light.   Neck: Normal range of motion.   Cardiovascular: Normal rate, regular rhythm, normal heart sounds and intact distal pulses.   Pulmonary/Chest: Effort normal and breath sounds normal.   Abdominal: Soft. Bowel sounds are normal.   Musculoskeletal: Normal range of motion.   Neurological: He is alert and oriented to person, place, and time. He has normal reflexes.   Skin: Skin is warm and dry.   Psychiatric: He has a normal mood and affect. His behavior is normal. Judgment and thought content normal.   Nursing note and vitals reviewed.      I have reviewed the following portions of the patient's history: allergies, current medications, past family history, past medical history, past social history, past surgical history, problem list and ROS and confirm it's accurate.      Procedure:        Assessment/Plan:   Bladder stone-we will set up a urgent cystoscopy            Patient's There is no height or weight on file to calculate BMI. BMI is above normal parameters. Recommendations include: educational material.      Smoking Cessation Counseling:  Never a smoker.  Patient does not currently use any tobacco products.                   This document has been electronically signed by TRACE BELLO MD July 25, 2019 10:06 AM

## 2019-07-31 ENCOUNTER — APPOINTMENT (OUTPATIENT)
Dept: CT IMAGING | Facility: HOSPITAL | Age: 64
End: 2019-07-31

## 2019-07-31 ENCOUNTER — HOSPITAL ENCOUNTER (EMERGENCY)
Facility: HOSPITAL | Age: 64
Discharge: HOME | End: 2019-08-01
Attending: FAMILY MEDICINE | Admitting: FAMILY MEDICINE

## 2019-07-31 ENCOUNTER — APPOINTMENT (OUTPATIENT)
Dept: GENERAL RADIOLOGY | Facility: HOSPITAL | Age: 64
End: 2019-07-31
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
## 2019-07-31 DIAGNOSIS — M79.18 MUSCULOSKELETAL PAIN: ICD-10-CM

## 2019-07-31 PROCEDURE — 71250 CT THORAX DX C-: CPT | Performed by: RADIOLOGY

## 2019-07-31 PROCEDURE — 25010000002 MORPHINE PER 10 MG: Performed by: FAMILY MEDICINE

## 2019-07-31 PROCEDURE — 70450 CT HEAD/BRAIN W/O DYE: CPT | Performed by: RADIOLOGY

## 2019-07-31 PROCEDURE — 71101 X-RAY EXAM UNILAT RIBS/CHEST: CPT

## 2019-07-31 PROCEDURE — 70450 CT HEAD/BRAIN W/O DYE: CPT

## 2019-07-31 PROCEDURE — 71250 CT THORAX DX C-: CPT

## 2019-07-31 PROCEDURE — 96372 THER/PROPH/DIAG INJ SC/IM: CPT

## 2019-07-31 PROCEDURE — 99283 EMERGENCY DEPT VISIT LOW MDM: CPT

## 2019-07-31 PROCEDURE — 71101 X-RAY EXAM UNILAT RIBS/CHEST: CPT | Performed by: RADIOLOGY

## 2019-07-31 RX ADMIN — MORPHINE SULFATE 4 MG: 4 INJECTION, SOLUTION INTRAMUSCULAR; INTRAVENOUS at 23:02

## 2019-08-01 VITALS
HEIGHT: 71 IN | OXYGEN SATURATION: 98 % | SYSTOLIC BLOOD PRESSURE: 153 MMHG | HEART RATE: 83 BPM | BODY MASS INDEX: 32.9 KG/M2 | DIASTOLIC BLOOD PRESSURE: 83 MMHG | TEMPERATURE: 97.6 F | WEIGHT: 235 LBS | RESPIRATION RATE: 18 BRPM

## 2019-08-01 RX ORDER — DICLOFENAC SODIUM 75 MG/1
75 TABLET, DELAYED RELEASE ORAL 2 TIMES DAILY
Qty: 30 TABLET | Refills: 0 | Status: SHIPPED | OUTPATIENT
Start: 2019-08-01 | End: 2021-04-13

## 2019-08-01 NOTE — ED PROVIDER NOTES
Subjective   64-year-old male presents to the ED with left-sided rib pain status post fall at 3 AM this morning.  Patient states he got up to go to the restroom and stumbled to the point that he fell.  Patient states that he hit his left side and face.  Patient denies loss of consciousness.  Patient states that he has neuropathy and has trouble feeling his feet when walking and states that he generally takes Ambien prior to going to bed, therefore was somewhat disoriented at 3 AM.  Patient denies shortness of breath or head injury.  Aggravating factors include movement.  Denies any alleviating factors.          History provided by:  Patient   used: No        Review of Systems   Constitutional: Negative.  Negative for fever.   HENT: Negative.    Respiratory: Negative.    Cardiovascular: Negative.  Negative for chest pain.   Gastrointestinal: Negative.  Negative for abdominal pain.   Endocrine: Negative.    Genitourinary: Negative.  Negative for dysuria.   Musculoskeletal:        (+) left sided rib pain   Skin: Negative.    Neurological: Negative.    Psychiatric/Behavioral: Negative.    All other systems reviewed and are negative.      Past Medical History:   Diagnosis Date   • Arthritis    • Bladder stones    • BPH (benign prostatic hyperplasia)    • Cancer (CMS/[facility])     skin ca- felix   • Claustrophobia    • COPD (chronic obstructive pulmonary disease) (CMS/[facility])    • Coronary artery disease    • Diabetes (CMS/[facility])    • Difficulty urinating    • Fracture, finger    • Hematuria    • High cholesterol    • History of sepsis    • Hypertension    • MI (myocardial infarction) (CMS/[facility])    • Prostate disease    • Stroke (CMS/[facility])     X3 last one 9/2016   TIA   • TIA (transient ischemic attack)    • UTI (urinary tract infection)        No Known Allergies    Past Surgical History:   Procedure Laterality Date   • CARDIAC CATHETERIZATION  03/03/2014   • CARDIAC SURGERY     • CARDIOVASCULAR STRESS TEST   04/04/2013   • CATARACT EXTRACTION     • CEREBRAL ANEURYSM REPAIR     • COLONOSCOPY     • CORONARY ANGIOPLASTY WITH STENT PLACEMENT     • CYSTOSCOPY LITHOLAPAXY BLADDER STONE EXTRACTION N/A 11/29/2017    Procedure: CYSTOSCOPY LITHOLAPAXY BLADDER STONE EXTRACTION;  Surgeon: Flavio Barrios MD;  Location[facility];  Service:    • ECHO - CONVERTED  02/02/2014   • EYE SURGERY     • INNER EAR SURGERY     • SKIN BIOPSY     • TONSILLECTOMY         Family History   Problem Relation Age of Onset   • Osteoporosis Mother    • Cancer Mother         breast and lung   • Cancer Father         small cell cancer, kidney   • Heart disease Father    • Diabetes Father    • No Known Problems Sister    • Aneurysm Brother        Social History     Socioeconomic History   • Marital status: Single     Spouse name: Not on file   • Number of children: Not on file   • Years of education: Not on file   • Highest education level: Not on file   Tobacco Use   • Smoking status: Current Every Day Smoker     Packs/day: 1.00     Years: 45.00     Pack years: 45.00   • Smokeless tobacco: Never Used   Substance and Sexual Activity   • Alcohol use: No   • Drug use: No   • Sexual activity: Defer     Birth control/protection: None           Objective   Physical Exam   Constitutional: He is oriented to person, place, and time. He appears well-developed and well-nourished. No distress.   HENT:   Head: Normocephalic and atraumatic.   Right Ear: External ear normal.   Left Ear: External ear normal.   Nose: Nose normal.   Eyes: Conjunctivae and EOM are normal. Pupils are equal, round, and reactive to light.   Neck: Normal range of motion. Neck supple. No JVD present. No tracheal deviation present.   Cardiovascular: Normal rate, regular rhythm and normal heart sounds.   No murmur heard.  Pulmonary/Chest: Effort normal and breath sounds normal. No respiratory distress. He has no wheezes. He exhibits tenderness and bony tenderness.       Abdominal: Soft.  Bowel sounds are normal. There is no tenderness.   Musculoskeletal: Normal range of motion. He exhibits no edema or deformity.        Cervical back: Normal.        Thoracic back: Normal.        Lumbar back: Normal.   Neurological: He is alert and oriented to person, place, and time. He has normal strength. He is not disoriented. No cranial nerve deficit or sensory deficit. He displays a negative Romberg sign. GCS eye subscore is 4. GCS verbal subscore is 5. GCS motor subscore is 6.   Skin: Skin is warm and dry. No rash noted. He is not diaphoretic. No erythema. No pallor.   Psychiatric: He has a normal mood and affect. His behavior is normal. Thought content normal.   Nursing note and vitals reviewed.      Procedures           ED Course  ED Course as of Aug 01 0049   Wed Jul 31, 2019   2203 Dr. Quigley recommends CT chest without contrast to further evaluation. XR Ribs Left With PA Chest [TK]   Thu Aug 01, 2019   0043 Per [facility], no definite noncontrast CT evidence of visceral injury.  Pulmonary nodules.  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  Healed fracture right seventh eighth ninth ribs.  No acute fractures. CT Chest Without Contrast [TK]   0045 Per [facility], no obvious acute findings. CT Head Without Contrast [TK]   0048 Discussed work-up with patient.  He was encouraged to take anti-inflammatories for pain and inflammation.  He will follow-up with his PCP in 2 days.  [TK]      ED Course User Index  [TK] Germain Olea PA-C                  [facility]      Final diagnoses:   Fall, initial encounter   Musculoskeletal pain            Germain Olea PA-C  08/01/19 0049

## 2019-08-13 ENCOUNTER — PROCEDURE VISIT (OUTPATIENT)
Dept: UROLOGY | Facility: CLINIC | Age: 64
End: 2019-08-13

## 2019-08-13 VITALS
DIASTOLIC BLOOD PRESSURE: 82 MMHG | HEIGHT: 71 IN | SYSTOLIC BLOOD PRESSURE: 142 MMHG | BODY MASS INDEX: 33.35 KG/M2 | WEIGHT: 238.2 LBS

## 2019-08-13 DIAGNOSIS — Z79.2 PROPHYLACTIC ANTIBIOTIC: Primary | ICD-10-CM

## 2019-08-13 DIAGNOSIS — R35.0 BENIGN PROSTATIC HYPERPLASIA WITH URINARY FREQUENCY: ICD-10-CM

## 2019-08-13 DIAGNOSIS — N40.1 BENIGN PROSTATIC HYPERPLASIA WITH URINARY FREQUENCY: ICD-10-CM

## 2019-08-13 PROCEDURE — 52000 CYSTOURETHROSCOPY: CPT | Performed by: UROLOGY

## 2019-08-13 PROCEDURE — 96372 THER/PROPH/DIAG INJ SC/IM: CPT | Performed by: UROLOGY

## 2019-08-13 RX ORDER — GENTAMICIN SULFATE 40 MG/ML
80 INJECTION, SOLUTION INTRAMUSCULAR; INTRAVENOUS ONCE
Status: COMPLETED | OUTPATIENT
Start: 2019-08-13 | End: 2019-08-13

## 2019-08-13 RX ADMIN — GENTAMICIN SULFATE 80 MG: 40 INJECTION, SOLUTION INTRAMUSCULAR; INTRAVENOUS at 10:40

## 2019-08-13 NOTE — PROGRESS NOTES
Chief Complaint:          Chief Complaint   Patient presents with   • Cystoscopy     Bladder stones       HPI:   64 y.o. male previously a patient here and saw Ginna in 2014 with BPH placed on doxazosin.  Apparently on Sunday started with significant pain and strangury as well as gross hematuria presents today clot retention having significant pain.  No fevers or chills.  He is in significant pain he is on Plavix for heart disease and stents.  I went ahead and anchored a catheter to gravity drainage and irrigated 5 L of fluid with a large amount of clot and cessation of the blood I told him to see the prostate bleeding or bladder tumor.  He has no flank pain or upper tract symptomatology whatsoever.  I left a 22 Polish catheter gravity drainage its clear him gentamicin and have a CBC and a BMP pending at the time of this dictation.  He had she was admitted the night after the above aforementioned visit.  In fact he was found to have massive BPH and bladder stones as the probable etiology of his symptomatology.  He actually did well after release.  He voided 500 cc after his catheter occluded knee pain now presents for cystoscopy.  Cystoscopy showed a large amount of clot I'm going to set him up for cystoscopy clot evacuation cystoscopy litholapaxy and follow-up based on this on Wednesday morning.  He is maximized with regard to his medical therapy.  He still has poorly controlled diabetes and significant heart disease but he is off the anticoagulant.           He returns today he's doing fantastic he has a great stream he gets up every 2 hours at night most likely secondary to his poorly controlled diabetes he has no pain with urination he has no bleeding he is a solid stream.  I told him that's okay to restart his anticoagulation but he needs to check with his cardiologist.  Overall is done extremely well.  He has no significant prostatic obstruction and I expect him to continue on finasteride and doing extremely  well and check him back in 6 months with a PSA    Interesting gentleman who returns today.  He had been on for blocker and finasteride.  He switched to taking the alpha-blocker only once a week and went off the finasteride completely and now is wetting wearing depends he thought it was from the medicine but I explained actually the medicine was preventing it he has 3+ leukocytes in the urine probably was secondary to irritation a postvoid residual of 89 cc he has no allergies he has urge urge incontinence.  With BPH he has greater than a 50 g prostate to palpation.  I am going to reinitiate therapy and set him up for follow-up.  I explained to him extensively the purpose of these medications as to prevent the problem rather than the other way around.  He returns today he got off his medication of his own volition and had a significant increase in his frequency there is a question of bladder stones.  Today he presents for cystoscopy basically I saw the enlarged prostate.  No stones but a snowstorm of debris and certainly is well on his way to developing stones but he is doing symptomatically order back on his appropriate medication and will discuss to keep him on it          Past Medical History:        Past Medical History:   Diagnosis Date   • Arthritis    • Bladder stones    • BPH (benign prostatic hyperplasia)    • Cancer (CMS/HCC)     skin ca- felix   • Claustrophobia    • COPD (chronic obstructive pulmonary disease) (CMS/HCC)    • Coronary artery disease    • Diabetes (CMS/HCC)    • Difficulty urinating    • Fracture, finger    • Hematuria    • High cholesterol    • History of sepsis    • Hypertension    • MI (myocardial infarction) (CMS/HCC)    • Prostate disease    • Stroke (CMS/HCC)     X3 last one 9/2016   TIA   • TIA (transient ischemic attack)    • UTI (urinary tract infection)          Current Meds:     Current Outpatient Medications   Medication Sig Dispense Refill   • aspirin  MG tablet Take 325 mg  by mouth Every 6 (Six) Hours As Needed.     • Blood Glucose Monitoring Suppl (ACCU-CHEK JOSE G) device Use as directed to test blood sugar three times daily and and bedtime. 1 each 0   • Clopidogrel Bisulfate (PLAVIX PO) Take 75 mg by mouth Daily.     • diclofenac (VOLTAREN) 75 MG EC tablet Take 1 tablet by mouth 2 (Two) Times a Day. 30 tablet 0   • doxazosin (CARDURA) 8 MG tablet Take 8 mg by mouth Every Morning.     • finasteride (PROSCAR) 5 MG tablet Take 1 tablet by mouth Daily. 30 tablet 0   • finasteride (PROSCAR) 5 MG tablet Take 1 tablet by mouth Daily. 30 tablet 5   • gabapentin (NEURONTIN) 400 MG capsule Take 400 mg by mouth 2 (two) times a day.     • glimepiride (AMARYL) 4 MG tablet Take 4 mg by mouth Every Morning Before Breakfast.     • glucose blood test strip Use as directed to test blood sugar three times daily and and bedtime. 120 each 0   • insulin aspart (novoLOG FLEXPEN) 100 UNIT/ML solution pen-injector sc pen Use as directed 4 times daily per sliding scale (0 to 7 units/dose). (Patient taking differently: 2 (Two) Times a Day.) 15 mL 0   • insulin detemir (LEVEMIR) 100 UNIT/ML injection Inject 15 Units under the skin Daily. 15 mL 0   • Insulin Pen Needle 29G X 12.7MM misc Use as directed with insulin three times daily with meals and at bedtime. 120 each 0   • metFORMIN (GLUCOPHAGE) 1000 MG tablet Take 1,000 mg by mouth Every Night.     • pantoprazole (PROTONIX) 40 MG EC tablet Take 40 mg by mouth daily.     • polyethylene glycol (MIRALAX) pack packet Mix 17 g with liquid of choice and drink   Daily. 500 g 0   • tamsulosin (FLOMAX) 0.4 MG capsule 24 hr capsule Take 1 capsule by mouth Every Night. 30 capsule 5   • TRUEPLUS LANCETS 33G misc Use as directed to test blood sugar three times daily and and bedtime. 120 each 0   • zolpidem (AMBIEN) 10 MG tablet Take 10 mg by mouth at night as needed.       No current facility-administered medications for this visit.         Allergies:      No Known  Allergies     Past Surgical History:     Past Surgical History:   Procedure Laterality Date   • CARDIAC CATHETERIZATION  03/03/2014   • CARDIAC SURGERY     • CARDIOVASCULAR STRESS TEST  04/04/2013   • CATARACT EXTRACTION     • CEREBRAL ANEURYSM REPAIR     • COLONOSCOPY     • CORONARY ANGIOPLASTY WITH STENT PLACEMENT     • CYSTOSCOPY LITHOLAPAXY BLADDER STONE EXTRACTION N/A 11/29/2017    Procedure: CYSTOSCOPY LITHOLAPAXY BLADDER STONE EXTRACTION;  Surgeon: Flavio Barrios MD;  Location: [facility];  Service:    • ECHO - CONVERTED  02/02/2014   • EYE SURGERY     • INNER EAR SURGERY     • SKIN BIOPSY     • TONSILLECTOMY           Social History:     Social History     Socioeconomic History   • Marital status: Single     Spouse name: Not on file   • Number of children: Not on file   • Years of education: Not on file   • Highest education level: Not on file   Tobacco Use   • Smoking status: Current Every Day Smoker     Packs/day: 1.00     Years: 45.00     Pack years: 45.00   • Smokeless tobacco: Never Used   Substance and Sexual Activity   • Alcohol use: No   • Drug use: No   • Sexual activity: Defer     Birth control/protection: None       Family History:     Family History   Problem Relation Age of Onset   • Osteoporosis Mother    • Cancer Mother         breast and lung   • Cancer Father         small cell cancer, kidney   • Heart disease Father    • Diabetes Father    • No Known Problems Sister    • Aneurysm Brother        Review of Systems:     Review of Systems   Constitutional: Negative.    HENT: Negative.    Eyes: Negative.    Respiratory: Negative.    Cardiovascular: Negative.    Gastrointestinal: Negative.    Endocrine: Negative.    Genitourinary: Positive for dysuria, frequency and urgency.   Musculoskeletal: Negative.    Allergic/Immunologic: Negative.    Neurological: Negative.    Hematological: Negative.    Psychiatric/Behavioral: Negative.        Physical Exam:     Physical Exam   Constitutional: He  is oriented to person, place, and time. He appears well-developed and well-nourished.   HENT:   Head: Normocephalic and atraumatic.   Eyes: Conjunctivae and EOM are normal. Pupils are equal, round, and reactive to light.   Neck: Normal range of motion.   Cardiovascular: Normal rate, regular rhythm, normal heart sounds and intact distal pulses.   Pulmonary/Chest: Effort normal and breath sounds normal.   Abdominal: Soft. Bowel sounds are normal.   Genitourinary: Penis normal.   Musculoskeletal: Normal range of motion.   Neurological: He is alert and oriented to person, place, and time. He has normal reflexes.   Skin: Skin is warm and dry.   Psychiatric: He has a normal mood and affect. His behavior is normal. Judgment and thought content normal.   Nursing note and vitals reviewed.      I have reviewed the following portions of the patient's history: allergies, current medications, past family history, past medical history, past social history, past surgical history, problem list and ROS and confirm it's accurate.      Procedure:   Cystoscopy:  Patient presents today for cystourethroscopy.  I went ahead and obtained an informed consent including the risk of anesthesia, bleeding, infection, etc.  After prep and drape in a sterile fashion in the low dorsal lithotomy position the urethra was gently anesthetized with 10 cc of 2% viscous Xylocaine jelly.  After an appropriate period of topical anesthesia I used the Olympus digital 14 [place] flexible cystoscope to examine the anterior urethra which was completely normal, the ureteral orifices were visualized and normal in position and configuration there were no stones, tumors or foreign bodies.  He actually had a large prostate and a snowstorm of debris but no stones . The patient was given 80 mg of gentamicin in an intramuscular fashion  as prophylaxis for the cystoscopy and released from the clinic.    Assessment/Plan:   BPH: Discussed the pathophysiology of BPH and  obstruction.  We discussed the static and dynamic effect effects of BPH as well as using 5 alpha reductase inhibitors versus alpha blockade.  We discussed the indications for transurethral surgery as well.  And/ or other therapeutic options available including all of the newer techniques.  His cystoscopy showed a large prostate in the snowstorm of debris but no obvious calculus and no endoluminal filling defect such as bladder tumors I am going to recommend continue aggressive oral therapy that he was on previously as he is tending to get better rather than something aggressive such as a TURP he is in complete agreement with this            Patient's There is no height or weight on file to calculate BMI. BMI is above normal parameters. Recommendations include: educational material.              This document has been electronically signed by TRACE BELLO MD August 13, 2019 10:24 AM

## 2019-12-21 ENCOUNTER — APPOINTMENT (OUTPATIENT)
Dept: GENERAL RADIOLOGY | Facility: HOSPITAL | Age: 64
End: 2019-12-21

## 2019-12-21 ENCOUNTER — HOSPITAL ENCOUNTER (EMERGENCY)
Facility: HOSPITAL | Age: 64
Discharge: HOME | End: 2019-12-21
Attending: FAMILY MEDICINE | Admitting: FAMILY MEDICINE

## 2019-12-21 ENCOUNTER — APPOINTMENT (OUTPATIENT)
Dept: ULTRASOUND IMAGING | Facility: HOSPITAL | Age: 64
End: 2019-12-21

## 2019-12-21 VITALS
DIASTOLIC BLOOD PRESSURE: 89 MMHG | WEIGHT: 235 LBS | SYSTOLIC BLOOD PRESSURE: 159 MMHG | HEART RATE: 79 BPM | HEIGHT: 72 IN | RESPIRATION RATE: 18 BRPM | TEMPERATURE: 98.1 F | OXYGEN SATURATION: 100 % | BODY MASS INDEX: 31.83 KG/M2

## 2019-12-21 DIAGNOSIS — R10.32 LEFT INGUINAL PAIN: ICD-10-CM

## 2019-12-21 DIAGNOSIS — N30.00 ACUTE CYSTITIS WITHOUT HEMATURIA: Primary | ICD-10-CM

## 2019-12-21 LAB
ALBUMIN SERPL-MCNC: 3.36 G/DL (ref 3.5–5.2)
ALBUMIN/GLOB SERPL: 0.8 G/DL
ALP SERPL-CCNC: 143 U/L (ref 39–117)
ALT SERPL W P-5'-P-CCNC: 24 U/L (ref 1–41)
ANION GAP SERPL CALCULATED.3IONS-SCNC: 13.8 MMOL/L (ref 5–15)
APTT PPP: 31.3 SECONDS (ref 23.8–36.1)
AST SERPL-CCNC: 68 U/L (ref 1–40)
BACTERIA UR QL AUTO: ABNORMAL /HPF
BASOPHILS # BLD AUTO: 0.06 10*3/MM3 (ref 0–0.2)
BASOPHILS NFR BLD AUTO: 0.8 % (ref 0–1.5)
BILIRUB SERPL-MCNC: 0.4 MG/DL (ref 0.2–1.2)
BILIRUB UR QL STRIP: NEGATIVE
BUN BLD-MCNC: 4 MG/DL (ref 8–23)
BUN/CREAT SERPL: 4.3 (ref 7–25)
CALCIUM SPEC-SCNC: 8.8 MG/DL (ref 8.6–10.5)
CHLORIDE SERPL-SCNC: 93 MMOL/L (ref 98–107)
CLARITY UR: ABNORMAL
CO2 SERPL-SCNC: 22.2 MMOL/L (ref 22–29)
COLOR UR: YELLOW
CREAT BLD-MCNC: 0.93 MG/DL (ref 0.76–1.27)
CRP SERPL-MCNC: 1.4 MG/DL (ref 0–0.5)
D-LACTATE SERPL-SCNC: 2.4 MMOL/L (ref 0.5–2)
DEPRECATED RDW RBC AUTO: 50.7 FL (ref 37–54)
EOSINOPHIL # BLD AUTO: 0.22 10*3/MM3 (ref 0–0.4)
EOSINOPHIL NFR BLD AUTO: 2.8 % (ref 0.3–6.2)
ERYTHROCYTE [DISTWIDTH] IN BLOOD BY AUTOMATED COUNT: 17.2 % (ref 12.3–15.4)
GFR SERPL CREATININE-BSD FRML MDRD: 82 ML/MIN/1.73
GLOBULIN UR ELPH-MCNC: 4.4 GM/DL
GLUCOSE BLD-MCNC: 415 MG/DL (ref 65–99)
GLUCOSE BLDC GLUCOMTR-MCNC: 208 MG/DL (ref 70–130)
GLUCOSE UR STRIP-MCNC: ABNORMAL MG/DL
HCT VFR BLD AUTO: 34.3 % (ref 37.5–51)
HGB BLD-MCNC: 10.7 G/DL (ref 13–17.7)
HGB UR QL STRIP.AUTO: ABNORMAL
HOLD SPECIMEN: NORMAL
HYALINE CASTS UR QL AUTO: ABNORMAL /LPF
IMM GRANULOCYTES # BLD AUTO: 0.04 10*3/MM3 (ref 0–0.05)
IMM GRANULOCYTES NFR BLD AUTO: 0.5 % (ref 0–0.5)
INR PPP: 1.01 (ref 0.9–1.1)
KETONES UR QL STRIP: NEGATIVE
LEUKOCYTE ESTERASE UR QL STRIP.AUTO: ABNORMAL
LYMPHOCYTES # BLD AUTO: 2.14 10*3/MM3 (ref 0.7–3.1)
LYMPHOCYTES NFR BLD AUTO: 26.9 % (ref 19.6–45.3)
MAGNESIUM SERPL-MCNC: 2 MG/DL (ref 1.6–2.4)
MCH RBC QN AUTO: 25.2 PG (ref 26.6–33)
MCHC RBC AUTO-ENTMCNC: 31.2 G/DL (ref 31.5–35.7)
MCV RBC AUTO: 80.7 FL (ref 79–97)
MONOCYTES # BLD AUTO: 0.97 10*3/MM3 (ref 0.1–0.9)
MONOCYTES NFR BLD AUTO: 12.2 % (ref 5–12)
NEUTROPHILS # BLD AUTO: 4.54 10*3/MM3 (ref 1.7–7)
NEUTROPHILS NFR BLD AUTO: 56.8 % (ref 42.7–76)
NITRITE UR QL STRIP: NEGATIVE
NRBC BLD AUTO-RTO: 0 /100 WBC (ref 0–0.2)
NT-PROBNP SERPL-MCNC: 150.4 PG/ML (ref 5–900)
PH UR STRIP.AUTO: 7 [PH] (ref 5–8)
PLATELET # BLD AUTO: 223 10*3/MM3 (ref 140–450)
PMV BLD AUTO: 11.6 FL (ref 6–12)
POTASSIUM BLD-SCNC: 4 MMOL/L (ref 3.5–5.2)
PROT SERPL-MCNC: 7.8 G/DL (ref 6–8.5)
PROT UR QL STRIP: NEGATIVE
PROTHROMBIN TIME: 13.8 SECONDS (ref 11–15.4)
RBC # BLD AUTO: 4.25 10*6/MM3 (ref 4.14–5.8)
RBC # UR: ABNORMAL /HPF
REF LAB TEST METHOD: ABNORMAL
SODIUM BLD-SCNC: 129 MMOL/L (ref 136–145)
SP GR UR STRIP: 1.02 (ref 1–1.03)
SQUAMOUS #/AREA URNS HPF: ABNORMAL /HPF
TROPONIN T SERPL-MCNC: <0.01 NG/ML (ref 0–0.03)
TSH SERPL DL<=0.05 MIU/L-ACNC: 4.75 UIU/ML (ref 0.27–4.2)
UROBILINOGEN UR QL STRIP: ABNORMAL
WBC NRBC COR # BLD: 7.97 10*3/MM3 (ref 3.4–10.8)
WBC UR QL AUTO: ABNORMAL /HPF

## 2019-12-21 PROCEDURE — 71045 X-RAY EXAM CHEST 1 VIEW: CPT | Performed by: RADIOLOGY

## 2019-12-21 PROCEDURE — 93005 ELECTROCARDIOGRAM TRACING: CPT | Performed by: FAMILY MEDICINE

## 2019-12-21 PROCEDURE — 85610 PROTHROMBIN TIME: CPT | Performed by: FAMILY MEDICINE

## 2019-12-21 PROCEDURE — 96365 THER/PROPH/DIAG IV INF INIT: CPT

## 2019-12-21 PROCEDURE — 63710000001 INSULIN REGULAR HUMAN PER 5 UNITS: Performed by: FAMILY MEDICINE

## 2019-12-21 PROCEDURE — 83880 ASSAY OF NATRIURETIC PEPTIDE: CPT | Performed by: FAMILY MEDICINE

## 2019-12-21 PROCEDURE — 83735 ASSAY OF MAGNESIUM: CPT | Performed by: FAMILY MEDICINE

## 2019-12-21 PROCEDURE — 86140 C-REACTIVE PROTEIN: CPT | Performed by: FAMILY MEDICINE

## 2019-12-21 PROCEDURE — 83605 ASSAY OF LACTIC ACID: CPT | Performed by: FAMILY MEDICINE

## 2019-12-21 PROCEDURE — 81001 URINALYSIS AUTO W/SCOPE: CPT | Performed by: FAMILY MEDICINE

## 2019-12-21 PROCEDURE — 99284 EMERGENCY DEPT VISIT MOD MDM: CPT

## 2019-12-21 PROCEDURE — 25010000002 ONDANSETRON PER 1 MG: Performed by: FAMILY MEDICINE

## 2019-12-21 PROCEDURE — 82962 GLUCOSE BLOOD TEST: CPT

## 2019-12-21 PROCEDURE — 93971 EXTREMITY STUDY: CPT

## 2019-12-21 PROCEDURE — 85730 THROMBOPLASTIN TIME PARTIAL: CPT | Performed by: FAMILY MEDICINE

## 2019-12-21 PROCEDURE — 25010000002 PIPERACILLIN-TAZOBACTAM: Performed by: FAMILY MEDICINE

## 2019-12-21 PROCEDURE — 84443 ASSAY THYROID STIM HORMONE: CPT | Performed by: FAMILY MEDICINE

## 2019-12-21 PROCEDURE — 84484 ASSAY OF TROPONIN QUANT: CPT | Performed by: FAMILY MEDICINE

## 2019-12-21 PROCEDURE — 80053 COMPREHEN METABOLIC PANEL: CPT | Performed by: FAMILY MEDICINE

## 2019-12-21 PROCEDURE — 25010000002 MORPHINE PER 10 MG: Performed by: FAMILY MEDICINE

## 2019-12-21 PROCEDURE — 85025 COMPLETE CBC W/AUTO DIFF WBC: CPT | Performed by: FAMILY MEDICINE

## 2019-12-21 PROCEDURE — 93010 ELECTROCARDIOGRAM REPORT: CPT | Performed by: INTERNAL MEDICINE

## 2019-12-21 PROCEDURE — 87040 BLOOD CULTURE FOR BACTERIA: CPT | Performed by: FAMILY MEDICINE

## 2019-12-21 PROCEDURE — 71045 X-RAY EXAM CHEST 1 VIEW: CPT

## 2019-12-21 PROCEDURE — 93971 EXTREMITY STUDY: CPT | Performed by: RADIOLOGY

## 2019-12-21 PROCEDURE — 96375 TX/PRO/DX INJ NEW DRUG ADDON: CPT

## 2019-12-21 RX ORDER — CEFDINIR 300 MG/1
300 CAPSULE ORAL 2 TIMES DAILY
Qty: 14 CAPSULE | Refills: 0 | Status: SHIPPED | OUTPATIENT
Start: 2019-12-21 | End: 2019-12-28

## 2019-12-21 RX ORDER — SODIUM CHLORIDE 0.9 % (FLUSH) 0.9 %
10 SYRINGE (ML) INJECTION AS NEEDED
Status: DISCONTINUED | OUTPATIENT
Start: 2019-12-21 | End: 2019-12-21 | Stop reason: HOSPADM

## 2019-12-21 RX ORDER — HYDROCODONE BITARTRATE AND ACETAMINOPHEN 5; 325 MG/1; MG/1
1 TABLET ORAL EVERY 6 HOURS PRN
Qty: 12 TABLET | Refills: 0 | Status: SHIPPED | OUTPATIENT
Start: 2019-12-21 | End: 2020-05-29

## 2019-12-21 RX ORDER — ONDANSETRON 2 MG/ML
4 INJECTION INTRAMUSCULAR; INTRAVENOUS ONCE
Status: COMPLETED | OUTPATIENT
Start: 2019-12-21 | End: 2019-12-21

## 2019-12-21 RX ADMIN — MORPHINE SULFATE 4 MG: 4 INJECTION, SOLUTION INTRAMUSCULAR; INTRAVENOUS at 13:54

## 2019-12-21 RX ADMIN — HUMAN INSULIN 8 UNITS: 100 INJECTION, SOLUTION SUBCUTANEOUS at 15:03

## 2019-12-21 RX ADMIN — PIPERACILLIN SODIUM,TAZOBACTAM SODIUM 3.38 G: 3; .375 INJECTION, POWDER, FOR SOLUTION INTRAVENOUS at 15:01

## 2019-12-21 RX ADMIN — ONDANSETRON 4 MG: 2 INJECTION, SOLUTION INTRAMUSCULAR; INTRAVENOUS at 13:54

## 2019-12-21 RX ADMIN — SODIUM CHLORIDE 1000 ML: 9 INJECTION, SOLUTION INTRAVENOUS at 13:54

## 2019-12-21 RX ADMIN — SODIUM CHLORIDE 1000 ML: 9 INJECTION, SOLUTION INTRAVENOUS at 15:01

## 2019-12-21 RX ADMIN — HUMAN INSULIN 5 UNITS: 100 INJECTION, SOLUTION SUBCUTANEOUS at 15:03

## 2019-12-21 NOTE — ED NOTES
Went to draw pt labs. Ultrasound tech was at bedside at this time.      Bar Hester  12/21/19 5010

## 2019-12-21 NOTE — ED NOTES
Pt reports bilateral feet swollen, left leg swollen, pain radiate to left groin. Redness,swollen noted on the second toe on the right foot, pt reports he has been like that for at least 5 days.     Gladis Bryan, RN  12/21/19 4924

## 2019-12-21 NOTE — ED PROVIDER NOTES
Subjective   Patient is a 64-year-old male presents emergency department for left lower extremity pain and swelling is been present for approximately 5 days.  He states that he has had fever, chills in addition to the swelling.  He fears that he has a blood clot in his leg.  The patient states that his legs are continuously numb and tingly from a history of peripheral neuropathy.  He has uncontrolled type 2 diabetes, and COPD as well as CAD.  The patient states that the symptoms have been getting progressively worse over the past few days.  He has not had any shortness of breath.  He also has not had any nausea or vomiting but has had decreased appetite since yesterday.  States of the last him he ate anything was yesterday morning.  He has not had any treatment for these symptoms prior to coming into the ER today.  The patient denies any injury.  He states that most of his symptoms are affecting the left leg and he complains of pain from his groin down to his knee.  He is also noticed that his right foot especially his second toe is a little bit red as well.  The patient has no other complaints at this time.          Review of Systems   Constitutional: Positive for appetite change, chills and fever. Negative for activity change, diaphoresis and fatigue.   HENT: Negative for congestion, postnasal drip, rhinorrhea, sinus pressure, sinus pain, sneezing and sore throat.    Eyes: Negative for pain, discharge, redness and itching.   Respiratory: Negative for apnea, cough, choking, chest tightness, shortness of breath, wheezing and stridor.    Cardiovascular: Negative for chest pain, palpitations and leg swelling.   Gastrointestinal: Negative for abdominal distention, abdominal pain, constipation, diarrhea, nausea and vomiting.   Endocrine: Negative for cold intolerance and heat intolerance.   Genitourinary: Negative for difficulty urinating and flank pain.   Musculoskeletal: Negative for arthralgias and back pain.   Skin:  Positive for color change. Negative for pallor and rash.   Neurological: Negative for dizziness, facial asymmetry, light-headedness and headaches.   Psychiatric/Behavioral: Negative for agitation, behavioral problems, confusion and decreased concentration.       Past Medical History:   Diagnosis Date   • Arthritis    • Bladder stones    • BPH (benign prostatic hyperplasia)    • Cancer (CMS/HCC)     skin ca- felix   • Claustrophobia    • COPD (chronic obstructive pulmonary disease) (CMS/[facility])    • Coronary artery disease    • Diabetes (CMS/HCC)    • Difficulty urinating    • Fracture, finger    • Hematuria    • High cholesterol    • History of sepsis    • Hypertension    • MI (myocardial infarction) (CMS/HCC)    • Prostate disease    • Stroke (CMS/HCC)     X3 last one 9/2016   TIA   • TIA (transient ischemic attack)    • UTI (urinary tract infection)        No Known Allergies    Past Surgical History:   Procedure Laterality Date   • CARDIAC CATHETERIZATION  03/03/2014   • CARDIAC SURGERY     • CARDIOVASCULAR STRESS TEST  04/04/2013   • CATARACT EXTRACTION     • CEREBRAL ANEURYSM REPAIR     • COLONOSCOPY     • CORONARY ANGIOPLASTY WITH STENT PLACEMENT     • CYSTOSCOPY LITHOLAPAXY BLADDER STONE EXTRACTION N/A 11/29/2017    Procedure: CYSTOSCOPY LITHOLAPAXY BLADDER STONE EXTRACTION;  Surgeon: Flavio Barrios MD;  Location: [facility];  Service:    • ECHO - CONVERTED  02/02/2014   • EYE SURGERY     • INNER EAR SURGERY     • SKIN BIOPSY     • TONSILLECTOMY         Family History   Problem Relation Age of Onset   • Osteoporosis Mother    • Cancer Mother         breast and lung   • Cancer Father         small cell cancer, kidney   • Heart disease Father    • Diabetes Father    • No Known Problems Sister    • Aneurysm Brother        Social History     Socioeconomic History   • Marital status: Single     Spouse name: Not on file   • Number of children: Not on file   • Years of education: Not on file   • Highest education  level: Not on file   Tobacco Use   • Smoking status: Current Every Day Smoker     Packs/day: 1.00     Years: 45.00     Pack years: 45.00   • Smokeless tobacco: Never Used   Substance and Sexual Activity   • Alcohol use: No   • Drug use: No   • Sexual activity: Defer     Birth control/protection: None           Objective   Physical Exam   Constitutional: He is oriented to person, place, and time. He appears well-developed and well-nourished. No distress.   HENT:   Head: Normocephalic and atraumatic.   Right Ear: External ear normal.   Left Ear: External ear normal.   Nose: Nose normal.   Mouth/Throat: Oropharynx is clear and moist. No oropharyngeal exudate.   Eyes: Pupils are equal, round, and reactive to light. Conjunctivae and EOM are normal. Right eye exhibits no discharge. Left eye exhibits no discharge. No scleral icterus.   Neck: Normal range of motion. Neck supple. No JVD present. No tracheal deviation present. No thyromegaly present.   Cardiovascular: Normal rate, regular rhythm, normal heart sounds and intact distal pulses. Exam reveals no gallop and no friction rub.   No murmur heard.  Pulmonary/Chest: Effort normal and breath sounds normal. No stridor. No respiratory distress. He has no wheezes. He has no rales.   Abdominal: Soft. Bowel sounds are normal. He exhibits no distension and no mass. There is no tenderness. There is no guarding.   Musculoskeletal:   There is very minimal redness around the patient's toes bilaterally, worse on the right than the left.  Dorsalis pedis pulses are palpated bilaterally easily.  There is very trace erythema on bilateral calves as well.   Neurological: He is alert and oriented to person, place, and time.   Skin: Skin is warm and dry. Capillary refill takes less than 2 seconds. No rash noted. He is not diaphoretic. No erythema. No pallor.   Psychiatric: He has a normal mood and affect. His behavior is normal.   Nursing note and vitals reviewed.      Procedures            ED Course  ED Course as of Dec 21 1738   Sat Dec 21, 2019   1524 Patient has elevated lactic acid however his blood sugars over 400 and he seems always have elevated lactic acid which may be from medications.  His heart rate returned to normal without any fluids but fluids were given for his hyperglycemia.  I do not believe that this patient is septic.  He was offered admission to the hospitalist both by myself, and Dr. Coles who came to evaluate him.  The patient states that he would much rather go home.  Due to the mild nature of his symptoms and his urinary tract infection I think that this is reasonable.  We will go ahead and discharge the patient at this point.    [EG]      ED Course User Index  [EG] Rosi Mishra DO                      No data recorded                        MDM  Number of Diagnoses or Management Options  Acute cystitis without hematuria: new and requires workup  Left inguinal pain: new and requires workup     Amount and/or Complexity of Data Reviewed  Clinical lab tests: ordered and reviewed  Tests in the radiology section of CPT®: ordered and reviewed  Tests in the medicine section of CPT®: ordered and reviewed  Discuss the patient with other providers: yes  Independent visualization of images, tracings, or specimens: yes    Risk of Complications, Morbidity, and/or Mortality  Presenting problems: high  Diagnostic procedures: high  Management options: high    Critical Care  Total time providing critical care: < 30 minutes    Patient Progress  Patient progress: stable      Final diagnoses:   Acute cystitis without hematuria   Left inguinal pain              Rosi Mishra DO  12/21/19 1739

## 2019-12-23 ENCOUNTER — OFFICE VISIT (OUTPATIENT)
Dept: UROLOGY | Facility: CLINIC | Age: 64
End: 2019-12-23

## 2019-12-23 VITALS — HEIGHT: 72 IN | BODY MASS INDEX: 31.83 KG/M2 | WEIGHT: 235 LBS

## 2019-12-23 DIAGNOSIS — R35.0 URINE FREQUENCY: ICD-10-CM

## 2019-12-23 DIAGNOSIS — N30.00 ACUTE CYSTITIS WITHOUT HEMATURIA: ICD-10-CM

## 2019-12-23 DIAGNOSIS — R33.9 INCOMPLETE BLADDER EMPTYING: Primary | ICD-10-CM

## 2019-12-23 LAB
BILIRUB BLD-MCNC: NEGATIVE MG/DL
CLARITY, POC: CLEAR
COLOR UR: YELLOW
GLUCOSE UR STRIP-MCNC: ABNORMAL MG/DL
KETONES UR QL: NEGATIVE
LEUKOCYTE EST, POC: ABNORMAL
NITRITE UR-MCNC: NEGATIVE MG/ML
PH UR: 7 [PH] (ref 5–8)
PROT UR STRIP-MCNC: NEGATIVE MG/DL
RBC # UR STRIP: ABNORMAL /UL
SP GR UR: 1.01 (ref 1–1.03)
UROBILINOGEN UR QL: NORMAL

## 2019-12-23 PROCEDURE — 99213 OFFICE O/P EST LOW 20 MIN: CPT | Performed by: UROLOGY

## 2019-12-23 PROCEDURE — 81003 URINALYSIS AUTO W/O SCOPE: CPT | Performed by: UROLOGY

## 2019-12-23 RX ORDER — NITROFURANTOIN 25; 75 MG/1; MG/1
100 CAPSULE ORAL 2 TIMES DAILY
Qty: 56 CAPSULE | Refills: 3 | Status: SHIPPED | OUTPATIENT
Start: 2019-12-23 | End: 2020-04-27 | Stop reason: SDUPTHER

## 2019-12-23 NOTE — PROGRESS NOTES
Chief Complaint:          Chief Complaint   Patient presents with   • ER f/u       HPI:   64 y.o. male previously a patient here and saw Ginna in 2014 with BPH placed on doxazosin.  Apparently on Sunday started with significant pain and strangury as well as gross hematuria presents today clot retention having significant pain.  No fevers or chills.  He is in significant pain he is on Plavix for heart disease and stents.  I went ahead and anchored a catheter to gravity drainage and irrigated 5 L of fluid with a large amount of clot and cessation of the blood I told him to see the prostate bleeding or bladder tumor.  He has no flank pain or upper tract symptomatology whatsoever.  I left a 22 Dominican catheter gravity drainage its clear him gentamicin and have a CBC and a BMP pending at the time of this dictation.  He had she was admitted the night after the above aforementioned visit.  In fact he was found to have massive BPH and bladder stones as the probable etiology of his symptomatology.  He actually did well after release.  He voided 500 cc after his catheter occluded knee pain now presents for cystoscopy.  Cystoscopy showed a large amount of clot I'm going to set him up for cystoscopy clot evacuation cystoscopy litholapaxy and follow-up based on this on Wednesday morning.  He is maximized with regard to his medical therapy.  He still has poorly controlled diabetes and significant heart disease but he is off the anticoagulant.           He returns today he's doing fantastic he has a great stream he gets up every 2 hours at night most likely secondary to his poorly controlled diabetes he has no pain with urination he has no bleeding he is a solid stream.  I told him that's okay to restart his anticoagulation but he needs to check with his cardiologist.  Overall is done extremely well.  He has no significant prostatic obstruction and I expect him to continue on finasteride and doing extremely well and check him  back in 6 months with a PSA    Interesting gentleman who returns today.  He had been on for blocker and finasteride.  He switched to taking the alpha-blocker only once a week and went off the finasteride completely and now is wetting wearing depends he thought it was from the medicine but I explained actually the medicine was preventing it he has 3+ leukocytes in the urine probably was secondary to irritation a postvoid residual of 89 cc he has no allergies he has urge urge incontinence.  With BPH he has greater than a 50 g prostate to palpation.  I am going to reinitiate therapy and set him up for follow-up.  I explained to him extensively the purpose of these medications as to prevent the problem rather than the other way around.  He returns today he got off his medication of his own volition and had a significant increase in his frequency there is a question of bladder stones.  Today he presents for cystoscopy basically I saw the enlarged prostate.  No stones but a snowstorm of debris and certainly is well on his way to developing stones but he is doing symptomatically order back on his appropriate medication and will discuss to keep him on it  He went to emergency room with frequency and urgency.  Basically was found to have a questionable urinary tract infection.  His urine culture was negative however.  Going to treat him for prostatitis and follow back up with him based on this.  They placed him on cefdinir 300 mg twice a day x7 days.  He had a CT of his chest here his urine today was negative it was positive I will await the culture here and I will follow-up with him based on this      Past Medical History:        Past Medical History:   Diagnosis Date   • Arthritis    • Bladder stones    • BPH (benign prostatic hyperplasia)    • Cancer (CMS/HCC)     skin ca- felix   • Claustrophobia    • COPD (chronic obstructive pulmonary disease) (CMS/HCC)    • Coronary artery disease    • Diabetes (CMS/HCC)    • Difficulty  urinating    • Fracture, finger    • Hematuria    • High cholesterol    • History of sepsis    • Hypertension    • MI (myocardial infarction) (CMS/[facility])    • Prostate disease    • Stroke (CMS/[facility])     X3 last one 9/2016   TIA   • TIA (transient ischemic attack)    • UTI (urinary tract infection)          Current Meds:     Current Outpatient Medications   Medication Sig Dispense Refill   • aspirin  MG tablet Take 325 mg by mouth Every 6 (Six) Hours As Needed.     • Blood Glucose Monitoring Suppl (ACCU-CHEK JOSE G) device Use as directed to test blood sugar three times daily and and bedtime. 1 each 0   • cefdinir (OMNICEF) 300 MG capsule Take 1 capsule by mouth 2 (Two) Times a Day for 7 days. 14 capsule 0   • Clopidogrel Bisulfate (PLAVIX PO) Take 75 mg by mouth Daily.     • diclofenac (VOLTAREN) 75 MG EC tablet Take 1 tablet by mouth 2 (Two) Times a Day. 30 tablet 0   • doxazosin (CARDURA) 8 MG tablet Take 8 mg by mouth Every Morning.     • finasteride (PROSCAR) 5 MG tablet Take 1 tablet by mouth Daily. 30 tablet 0   • finasteride (PROSCAR) 5 MG tablet Take 1 tablet by mouth Daily. 30 tablet 5   • gabapentin (NEURONTIN) 400 MG capsule Take 400 mg by mouth 2 (two) times a day.     • glimepiride (AMARYL) 4 MG tablet Take 4 mg by mouth Every Morning Before Breakfast.     • glucose blood test strip Use as directed to test blood sugar three times daily and and bedtime. 120 each 0   • HYDROcodone-acetaminophen (NORCO) 5-325 MG per tablet Take 1 tablet by mouth Every 6 (Six) Hours As Needed for Moderate Pain . 12 tablet 0   • insulin aspart (novoLOG FLEXPEN) 100 UNIT/ML solution pen-injector sc pen Use as directed 4 times daily per sliding scale (0 to 7 units/dose). (Patient taking differently: 2 (Two) Times a Day.) 15 mL 0   • insulin detemir (LEVEMIR) 100 UNIT/ML injection Inject 15 Units under the skin Daily. 15 mL 0   • Insulin Pen Needle 29G X 12.7MM misc Use as directed with insulin three times daily with meals  and at bedtime. 120 each 0   • metFORMIN (GLUCOPHAGE) 1000 MG tablet Take 1,000 mg by mouth Every Night.     • pantoprazole (PROTONIX) 40 MG EC tablet Take 40 mg by mouth daily.     • polyethylene glycol (MIRALAX) pack packet Mix 17 g with liquid of choice and drink   Daily. 500 g 0   • tamsulosin (FLOMAX) 0.4 MG capsule 24 hr capsule Take 1 capsule by mouth Every Night. 30 capsule 5   • TRUEPLUS LANCETS 33G misc Use as directed to test blood sugar three times daily and and bedtime. 120 each 0   • zolpidem (AMBIEN) 10 MG tablet Take 10 mg by mouth at night as needed.       No current facility-administered medications for this visit.         Allergies:      No Known Allergies     Past Surgical History:     Past Surgical History:   Procedure Laterality Date   • CARDIAC CATHETERIZATION  03/03/2014   • CARDIAC SURGERY     • CARDIOVASCULAR STRESS TEST  04/04/2013   • CATARACT EXTRACTION     • CEREBRAL ANEURYSM REPAIR     • COLONOSCOPY     • CORONARY ANGIOPLASTY WITH STENT PLACEMENT     • CYSTOSCOPY LITHOLAPAXY BLADDER STONE EXTRACTION N/A 11/29/2017    Procedure: CYSTOSCOPY LITHOLAPAXY BLADDER STONE EXTRACTION;  Surgeon: Flavio Barrios MD;  Location: [facility];  Service:    • ECHO - CONVERTED  02/02/2014   • EYE SURGERY     • INNER EAR SURGERY     • SKIN BIOPSY     • TONSILLECTOMY           Social History:     Social History     Socioeconomic History   • Marital status: Single     Spouse name: Not on file   • Number of children: Not on file   • Years of education: Not on file   • Highest education level: Not on file   Tobacco Use   • Smoking status: Current Every Day Smoker     Packs/day: 1.00     Years: 45.00     Pack years: 45.00   • Smokeless tobacco: Never Used   Substance and Sexual Activity   • Alcohol use: No   • Drug use: No   • Sexual activity: Defer     Birth control/protection: None       Family History:     Family History   Problem Relation Age of Onset   • Osteoporosis Mother    • Cancer Mother          breast and lung   • Cancer Father         small cell cancer, kidney   • Heart disease Father    • Diabetes Father    • No Known Problems Sister    • Aneurysm Brother        Review of Systems:     Review of Systems   Constitutional: Negative.    HENT: Negative.    Eyes: Negative.    Respiratory: Negative.    Cardiovascular: Negative.    Gastrointestinal: Negative.    Endocrine: Negative.    Musculoskeletal: Negative.    Allergic/Immunologic: Negative.    Neurological: Negative.    Hematological: Negative.    Psychiatric/Behavioral: Negative.        Physical Exam:     Physical Exam   Constitutional: He is oriented to person, place, and time. He appears well-developed and well-nourished.   HENT:   Head: Normocephalic and atraumatic.   Eyes: Pupils are equal, round, and reactive to light. Conjunctivae and EOM are normal.   Neck: Normal range of motion.   Cardiovascular: Normal rate, regular rhythm, normal heart sounds and intact distal pulses.   Pulmonary/Chest: Effort normal and breath sounds normal.   Abdominal: Soft. Bowel sounds are normal.   Musculoskeletal: Normal range of motion.   Neurological: He is alert and oriented to person, place, and time. He has normal reflexes.   Skin: Skin is warm and dry.   Psychiatric: He has a normal mood and affect. His behavior is normal. Judgment and thought content normal.   Nursing note and vitals reviewed.      I have reviewed the following portions of the patient's history: allergies, current medications, past family history, past medical history, past social history, past surgical history, problem list and ROS and confirm it's accurate.      Procedure:       Assessment/Plan:   Recurrent urinary tract infections-patient has been referred and diagnosed with recurrent urinary tract infections.  We discussed the types of organisms that are found in the urinary tract indicating that the vast majority are results of the patient's own gastrointestinal jerrica.  We discussed how  many of the antibiotics that are utilized can actually exacerbate these infections by creating resistant organisms and there is only a very few antibiotics that are concentrated in the urine and do not affect the rectal reservoir nor cause recurrent yeast vaginitis.  We discussed the risk factors for recurrent infections being intercourse in younger patients and atrophic changes in older patients.  We discussed the symptoms that are found including pain, pressure, burning, frequency, urgency suprapubic pain and painful intercourse.  I discussed upper tract symptoms including fevers, chills, and indicated the workup would be much more aggressive if the patient were to present with recurrent infections in the face of upper tract symptomatology such as fever.  I discussed the history of vesicoureteral reflux in young patients and finally chronic renal scarring as a result of such.  I recommend concomitant probiotics with treatment with antibiotics to protect the rectal reservoir including over-the-counter yogurt preparations to marcy oral pills containing the appropriate probiotics.  Await urine culture he is on cefdinir 300 mg twice a day for a week            Patient's Body mass index is 31.87 kg/m². BMI is above normal parameters. Recommendations include: educational material.              This document has been electronically signed by TRACE BELLO MD December 23, 2019 1:58 PM

## 2019-12-25 LAB
BACTERIA UR CULT: NO GROWTH
BACTERIA UR CULT: NORMAL

## 2019-12-26 PROBLEM — N30.00 ACUTE CYSTITIS WITHOUT HEMATURIA: Status: ACTIVE | Noted: 2019-12-26

## 2019-12-26 LAB
BACTERIA SPEC AEROBE CULT: NORMAL
BACTERIA SPEC AEROBE CULT: NORMAL

## 2020-01-02 DIAGNOSIS — N40.1 BENIGN PROSTATIC HYPERPLASIA WITH URINARY FREQUENCY: ICD-10-CM

## 2020-01-02 DIAGNOSIS — R35.0 BENIGN PROSTATIC HYPERPLASIA WITH URINARY FREQUENCY: ICD-10-CM

## 2020-01-02 RX ORDER — TAMSULOSIN HYDROCHLORIDE 0.4 MG/1
CAPSULE ORAL
Qty: 30 CAPSULE | Refills: 4 | Status: SHIPPED | OUTPATIENT
Start: 2020-01-02 | End: 2020-05-06 | Stop reason: SDUPTHER

## 2020-01-16 ENCOUNTER — HOSPITAL ENCOUNTER (EMERGENCY)
Facility: HOSPITAL | Age: 65
Discharge: HOME | End: 2020-01-16
Attending: FAMILY MEDICINE | Admitting: FAMILY MEDICINE

## 2020-01-16 ENCOUNTER — APPOINTMENT (OUTPATIENT)
Dept: GENERAL RADIOLOGY | Facility: HOSPITAL | Age: 65
End: 2020-01-16

## 2020-01-16 ENCOUNTER — APPOINTMENT (OUTPATIENT)
Dept: CT IMAGING | Facility: HOSPITAL | Age: 65
End: 2020-01-16

## 2020-01-16 VITALS
HEART RATE: 98 BPM | RESPIRATION RATE: 16 BRPM | OXYGEN SATURATION: 98 % | SYSTOLIC BLOOD PRESSURE: 144 MMHG | HEIGHT: 71 IN | DIASTOLIC BLOOD PRESSURE: 76 MMHG | WEIGHT: 222 LBS | BODY MASS INDEX: 31.08 KG/M2 | TEMPERATURE: 98.5 F
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
## 2020-01-16 DIAGNOSIS — S00.83XA CONTUSION OF FACE, INITIAL ENCOUNTER: ICD-10-CM

## 2020-01-16 DIAGNOSIS — S51.812A SKIN TEAR OF LEFT FOREARM WITHOUT COMPLICATION, INITIAL ENCOUNTER: ICD-10-CM

## 2020-01-16 PROCEDURE — 72125 CT NECK SPINE W/O DYE: CPT

## 2020-01-16 PROCEDURE — 73090 X-RAY EXAM OF FOREARM: CPT

## 2020-01-16 PROCEDURE — 70486 CT MAXILLOFACIAL W/O DYE: CPT

## 2020-01-16 PROCEDURE — 90471 IMMUNIZATION ADMIN: CPT | Performed by: FAMILY MEDICINE

## 2020-01-16 PROCEDURE — 96372 THER/PROPH/DIAG INJ SC/IM: CPT

## 2020-01-16 PROCEDURE — 70450 CT HEAD/BRAIN W/O DYE: CPT

## 2020-01-16 PROCEDURE — 99284 EMERGENCY DEPT VISIT MOD MDM: CPT

## 2020-01-16 PROCEDURE — 25010000002 TDAP 5-2.5-18.5 LF-MCG/0.5 SUSPENSION: Performed by: FAMILY MEDICINE

## 2020-01-16 PROCEDURE — 25010000002 MORPHINE PER 10 MG: Performed by: FAMILY MEDICINE

## 2020-01-16 PROCEDURE — 90715 TDAP VACCINE 7 YRS/> IM: CPT | Performed by: FAMILY MEDICINE

## 2020-01-16 RX ADMIN — TETANUS TOXOID, REDUCED DIPHTHERIA TOXOID AND ACELLULAR PERTUSSIS VACCINE, ADSORBED 0.5 ML: 5; 2.5; 8; 8; 2.5 SUSPENSION INTRAMUSCULAR at 02:13

## 2020-01-16 RX ADMIN — MORPHINE SULFATE 4 MG: 4 INJECTION, SOLUTION INTRAMUSCULAR; INTRAVENOUS at 02:13

## 2020-01-16 NOTE — ED PROVIDER NOTES
Subjective   Patient is a 64-year-old male presents emergency department after fall which occurred just prior to arrival.  The patient states he was getting up to urinate in the middle the night and states that he fell due to his peripheral neuropathy.  The patient states that he falls frequently.  He fell and landed on the front of his face and complains of right-sided facial pain and headache.  He does not think he lost consciousness but is not completely sure.  He currently complains of right-sided facial pain, mild headache, but denies any nausea vomiting or changes in vision.  He also sustained a large skin tear to his left forearm.  He states that he is not having any other symptoms right now.  He has no other complaints.          Review of Systems   Constitutional: Negative for activity change, appetite change, chills, diaphoresis, fatigue and fever.   HENT: Positive for facial swelling. Negative for congestion, sinus pressure, sneezing, sore throat and tinnitus.    Eyes: Negative for pain, discharge, redness and itching.   Respiratory: Negative for apnea, cough, choking, chest tightness, shortness of breath and stridor.    Cardiovascular: Negative for chest pain, palpitations and leg swelling.   Gastrointestinal: Negative for abdominal distention, abdominal pain, constipation, diarrhea and nausea.   Genitourinary: Negative for difficulty urinating and frequency.   Musculoskeletal: Negative for arthralgias, back pain and gait problem.   Skin: Positive for wound.   Neurological: Positive for headaches. Negative for dizziness, facial asymmetry and light-headedness.   Psychiatric/Behavioral: Negative for agitation, behavioral problems, confusion and decreased concentration.       Past Medical History:   Diagnosis Date   • Arthritis    • Bladder stones    • BPH (benign prostatic hyperplasia)    • Cancer (CMS/[facility])     skin ca- felix   • Claustrophobia    • COPD (chronic obstructive pulmonary disease) (CMS/[facility])    •  Coronary artery disease    • Diabetes (CMS/HCC)    • Difficulty urinating    • Fracture, finger    • Hematuria    • High cholesterol    • History of sepsis    • Hypertension    • MI (myocardial infarction) (CMS/HCC)    • Prostate disease    • Stroke (CMS/HCC)     X3 last one 9/2016   TIA   • TIA (transient ischemic attack)    • UTI (urinary tract infection)        No Known Allergies    Past Surgical History:   Procedure Laterality Date   • CARDIAC CATHETERIZATION  03/03/2014   • CARDIAC SURGERY     • CARDIOVASCULAR STRESS TEST  04/04/2013   • CATARACT EXTRACTION     • CEREBRAL ANEURYSM REPAIR     • COLONOSCOPY     • CORONARY ANGIOPLASTY WITH STENT PLACEMENT     • CYSTOSCOPY LITHOLAPAXY BLADDER STONE EXTRACTION N/A 11/29/2017    Procedure: CYSTOSCOPY LITHOLAPAXY BLADDER STONE EXTRACTION;  Surgeon: Flavio Barrios MD;  Location: [facility];  Service:    • ECHO - CONVERTED  02/02/2014   • EYE SURGERY     • INNER EAR SURGERY     • SKIN BIOPSY     • TONSILLECTOMY         Family History   Problem Relation Age of Onset   • Osteoporosis Mother    • Cancer Mother         breast and lung   • Cancer Father         small cell cancer, kidney   • Heart disease Father    • Diabetes Father    • No Known Problems Sister    • Aneurysm Brother        Social History     Socioeconomic History   • Marital status: Single     Spouse name: Not on file   • Number of children: Not on file   • Years of education: Not on file   • Highest education level: Not on file   Tobacco Use   • Smoking status: Current Every Day Smoker     Packs/day: 1.00     Years: 45.00     Pack years: 45.00   • Smokeless tobacco: Never Used   Substance and Sexual Activity   • Alcohol use: No   • Drug use: No   • Sexual activity: Defer     Birth control/protection: None           Objective   Physical Exam   Constitutional: He is oriented to person, place, and time. He appears well-developed and well-nourished. No distress.   HENT:   Head: Normocephalic.   Right  Ear: External ear normal.   Left Ear: External ear normal.   Nose: Nose normal.   Mouth/Throat: Oropharynx is clear and moist.   Abrasions along right nose and swelling along right zygoma   Eyes: Pupils are equal, round, and reactive to light. Conjunctivae and EOM are normal. Right eye exhibits no discharge. Left eye exhibits no discharge. No scleral icterus.   Neck: Normal range of motion. Neck supple. No JVD present. No tracheal deviation present. No thyromegaly present.   Cardiovascular: Normal rate, regular rhythm, normal heart sounds and intact distal pulses. Exam reveals no gallop and no friction rub.   No murmur heard.  Pulmonary/Chest: Effort normal and breath sounds normal. No stridor. No respiratory distress. He has no wheezes. He has no rales.   Abdominal: Soft. Bowel sounds are normal. He exhibits no distension and no mass. There is no tenderness. There is no guarding.   Musculoskeletal: Normal range of motion. He exhibits no edema, tenderness or deformity.   Neurological: He is alert and oriented to person, place, and time. He displays normal reflexes. No cranial nerve deficit. Coordination normal.   Skin: Skin is warm and dry. Capillary refill takes less than 2 seconds. No rash noted. He is not diaphoretic. No erythema. No pallor.   Large skin tear on lateral left forearm   Psychiatric: He has a normal mood and affect. His behavior is normal.   Nursing note and vitals reviewed.      Procedures           ED Course                                               MDM  Number of Diagnoses or Management Options  Contusion of face, initial encounter: new and requires workup  Fall, initial encounter: new and requires workup  Skin tear of left forearm without complication, initial encounter: new and requires workup     Amount and/or Complexity of Data Reviewed  Tests in the radiology section of CPT®: ordered and reviewed  Tests in the medicine section of CPT®: ordered and reviewed  Independent visualization of  images, tracings, or specimens: yes    Risk of Complications, Morbidity, and/or Mortality  Presenting problems: high  Diagnostic procedures: high  Management options: high    Critical Care  Total time providing critical care: < 30 minutes    Patient Progress  Patient progress: stable      Final diagnoses:   Fall, initial encounter   Contusion of face, initial encounter   Skin tear of left forearm without complication, initial encounter            Rosi Mishra DO  01/16/20 0333

## 2020-01-16 NOTE — ED NOTES
Dr. Mishra at bedside at this time. Pt's skin tear cleaned with iodine, wrapped with petroleum gauze, gauze, and ace bandage. Pt tolerated well.      Shayna Lancaster RN  01/16/20 0247

## 2020-01-20 ENCOUNTER — OFFICE VISIT (OUTPATIENT)
Dept: UROLOGY | Facility: CLINIC | Age: 65
End: 2020-01-20

## 2020-01-20 VITALS — HEIGHT: 71 IN | BODY MASS INDEX: 31.78 KG/M2 | WEIGHT: 227 LBS

## 2020-01-20 DIAGNOSIS — N39.0 URINARY TRACT INFECTION WITHOUT HEMATURIA, SITE UNSPECIFIED: Primary | ICD-10-CM

## 2020-01-20 DIAGNOSIS — R30.0 DYSURIA: ICD-10-CM

## 2020-01-20 LAB
BILIRUB BLD-MCNC: NEGATIVE MG/DL
CLARITY, POC: ABNORMAL
COLOR UR: YELLOW
GLUCOSE UR STRIP-MCNC: ABNORMAL MG/DL
KETONES UR QL: NEGATIVE
LEUKOCYTE EST, POC: ABNORMAL
NITRITE UR-MCNC: NEGATIVE MG/ML
PH UR: 6.5 [PH] (ref 5–8)
PROT UR STRIP-MCNC: ABNORMAL MG/DL
RBC # UR STRIP: ABNORMAL /UL
SP GR UR: 1.01 (ref 1–1.03)
UROBILINOGEN UR QL: NORMAL

## 2020-01-20 PROCEDURE — 81003 URINALYSIS AUTO W/O SCOPE: CPT | Performed by: UROLOGY

## 2020-01-20 PROCEDURE — 99213 OFFICE O/P EST LOW 20 MIN: CPT | Performed by: UROLOGY

## 2020-01-20 RX ORDER — GABAPENTIN 600 MG/1
2400 TABLET ORAL NIGHTLY
COMMUNITY
Start: 2020-01-13

## 2020-01-20 RX ORDER — FUROSEMIDE 20 MG/1
20 TABLET ORAL DAILY PRN
COMMUNITY
Start: 2019-12-26 | End: 2021-03-11

## 2020-01-20 NOTE — PROGRESS NOTES
Chief Complaint:          Chief Complaint   Patient presents with   • Recurrent UTI       HPI:   64 y.o. male  previously a patient here and saw Ginna in 2014 with BPH placed on doxazosin.  Apparently on Sunday started with significant pain and strangury as well as gross hematuria presents today clot retention having significant pain.  No fevers or chills.  He is in significant pain he is on Plavix for heart disease and stents.  I went ahead and anchored a catheter to gravity drainage and irrigated 5 L of fluid with a large amount of clot and cessation of the blood I told him to see the prostate bleeding or bladder tumor.  He has no flank pain or upper tract symptomatology whatsoever.  I left a 22 [place] catheter gravity drainage its clear him gentamicin and have a CBC and a BMP pending at the time of this dictation.  He had she was admitted the night after the above aforementioned visit.  In fact he was found to have massive BPH and bladder stones as the probable etiology of his symptomatology.  He actually did well after release.  He voided 500 cc after his catheter occluded knee pain now presents for cystoscopy.  Cystoscopy showed a large amount of clot I'm going to set him up for cystoscopy clot evacuation cystoscopy litholapaxy and follow-up based on this on Wednesday morning.  He is maximized with regard to his medical therapy.  He still has poorly controlled diabetes and significant heart disease but he is off the anticoagulant.           He returns today he's doing fantastic he has a great stream he gets up every 2 hours at night most likely secondary to his poorly controlled diabetes he has no pain with urination he has no bleeding he is a solid stream.  I told him that's okay to restart his anticoagulation but he needs to check with his cardiologist.  Overall is done extremely well.  He has no significant prostatic obstruction and I expect him to continue on finasteride and doing extremely well and check  him back in 6 months with a PSA    Interesting gentleman who returns today.  He had been on for blocker and finasteride.  He switched to taking the alpha-blocker only once a week and went off the finasteride completely and now is wetting wearing depends he thought it was from the medicine but I explained actually the medicine was preventing it he has 3+ leukocytes in the urine probably was secondary to irritation a postvoid residual of 89 cc he has no allergies he has urge urge incontinence.  With BPH he has greater than a 50 g prostate to palpation.  I am going to reinitiate therapy and set him up for follow-up.  I explained to him extensively the purpose of these medications as to prevent the problem rather than the other way around.  He returns today he got off his medication of his own volition and had a significant increase in his frequency there is a question of bladder stones.  Today he presents for cystoscopy basically I saw the enlarged prostate.  No stones but a snowstorm of debris and certainly is well on his way to developing stones but he is doing symptomatically order back on his appropriate medication and will discuss to keep him on it  He went to emergency room with frequency and urgency.  Basically was found to have a questionable urinary tract infection.  His urine culture was negative however.  Going to treat him for prostatitis and follow back up with him based on this.  They placed him on cefdinir 300 mg twice a day x7 days.  He had a CT of his chest here his urine today was negative it was positive I will await the culture here and I will follow-up with him based on this  He returns today.  His last culture was negative today he has a right black eye.  He still has the frequency.  His culture from 1223 was no growth final.  He has 2+ leukocytes which I cultured I am to set him up for cystoscopy I suspect were dealing with this his bladder stone has recurred he has been very noncompliant about  his medications      Past Medical History:        Past Medical History:   Diagnosis Date   • Arthritis    • Bladder stones    • BPH (benign prostatic hyperplasia)    • Cancer (CMS/[facility])     skin ca- felix   • Claustrophobia    • COPD (chronic obstructive pulmonary disease) (CMS/[facility])    • Coronary artery disease    • Diabetes (CMS/[facility])    • Difficulty urinating    • Fracture, finger    • Hematuria    • High cholesterol    • History of sepsis    • Hypertension    • MI (myocardial infarction) (CMS/[facility])    • Prostate disease    • Stroke (CMS/[facility])     X3 last one 9/2016   TIA   • TIA (transient ischemic attack)    • UTI (urinary tract infection)          Current Meds:     Current Outpatient Medications   Medication Sig Dispense Refill   • aspirin  MG tablet Take 325 mg by mouth Every 6 (Six) Hours As Needed.     • Blood Glucose Monitoring Suppl (ACCU-CHEK JOSE G) device Use as directed to test blood sugar three times daily and and bedtime. 1 each 0   • Clopidogrel Bisulfate (PLAVIX PO) Take 75 mg by mouth Daily.     • diclofenac (VOLTAREN) 75 MG EC tablet Take 1 tablet by mouth 2 (Two) Times a Day. 30 tablet 0   • doxazosin (CARDURA) 8 MG tablet Take 8 mg by mouth Every Morning.     • finasteride (PROSCAR) 5 MG tablet Take 1 tablet by mouth Daily. 30 tablet 0   • finasteride (PROSCAR) 5 MG tablet Take 1 tablet by mouth Daily. 30 tablet 5   • furosemide (LASIX) 20 MG tablet      • gabapentin (NEURONTIN) 400 MG capsule Take 400 mg by mouth 2 (two) times a day.     • gabapentin (NEURONTIN) 600 MG tablet      • glimepiride (AMARYL) 4 MG tablet Take 4 mg by mouth Every Morning Before Breakfast.     • glucose blood test strip Use as directed to test blood sugar three times daily and and bedtime. 120 each 0   • HYDROcodone-acetaminophen (NORCO) 5-325 MG per tablet Take 1 tablet by mouth Every 6 (Six) Hours As Needed for Moderate Pain . 12 tablet 0   • insulin aspart (novoLOG FLEXPEN) 100 UNIT/ML solution pen-injector sc pen  Use as directed 4 times daily per sliding scale (0 to 7 units/dose). (Patient taking differently: 2 (Two) Times a Day.) 15 mL 0   • insulin detemir (LEVEMIR) 100 UNIT/ML injection Inject 15 Units under the skin Daily. 15 mL 0   • Insulin Pen Needle 29G X 12.7MM misc Use as directed with insulin three times daily with meals and at bedtime. 120 each 0   • metFORMIN (GLUCOPHAGE) 1000 MG tablet Take 1,000 mg by mouth Every Night.     • metFORMIN (GLUCOPHAGE) 500 MG tablet      • nitrofurantoin, macrocrystal-monohydrate, (MACROBID) 100 MG capsule Take 1 capsule by mouth 2 (Two) Times a Day. 56 capsule 3   • pantoprazole (PROTONIX) 40 MG EC tablet Take 40 mg by mouth daily.     • polyethylene glycol (MIRALAX) pack packet Mix 17 g with liquid of choice and drink   Daily. 500 g 0   • tamsulosin (FLOMAX) 0.4 MG capsule 24 hr capsule TAKE ONE CAPSULE BY MOUTH EVERY NIGHT 30 capsule 4   • TRUEPLUS LANCETS 33G misc Use as directed to test blood sugar three times daily and and bedtime. 120 each 0   • zolpidem (AMBIEN) 10 MG tablet Take 10 mg by mouth at night as needed.       No current facility-administered medications for this visit.         Allergies:      No Known Allergies     Past Surgical History:     Past Surgical History:   Procedure Laterality Date   • CARDIAC CATHETERIZATION  03/03/2014   • CARDIAC SURGERY     • CARDIOVASCULAR STRESS TEST  04/04/2013   • CATARACT EXTRACTION     • CEREBRAL ANEURYSM REPAIR     • COLONOSCOPY     • CORONARY ANGIOPLASTY WITH STENT PLACEMENT     • CYSTOSCOPY LITHOLAPAXY BLADDER STONE EXTRACTION N/A 11/29/2017    Procedure: CYSTOSCOPY LITHOLAPAXY BLADDER STONE EXTRACTION;  Surgeon: Flavio Barrios MD;  Location: [facility];  Service:    • ECHO - CONVERTED  02/02/2014   • EYE SURGERY     • INNER EAR SURGERY     • SKIN BIOPSY     • TONSILLECTOMY           Social History:     Social History     Socioeconomic History   • Marital status: Single     Spouse name: Not on file   • Number of  children: Not on file   • Years of education: Not on file   • Highest education level: Not on file   Tobacco Use   • Smoking status: Current Every Day Smoker     Packs/day: 1.00     Years: 45.00     Pack years: 45.00   • Smokeless tobacco: Never Used   Substance and Sexual Activity   • Alcohol use: No   • Drug use: No   • Sexual activity: Defer     Birth control/protection: None       Family History:     Family History   Problem Relation Age of Onset   • Osteoporosis Mother    • Cancer Mother         breast and lung   • Cancer Father         small cell cancer, kidney   • Heart disease Father    • Diabetes Father    • No Known Problems Sister    • Aneurysm Brother        Review of Systems:     Review of Systems   Constitutional: Negative.    HENT: Negative.    Eyes: Negative.    Respiratory: Negative.    Cardiovascular: Negative.    Gastrointestinal: Negative.    Endocrine: Negative.    Musculoskeletal: Negative.    Allergic/Immunologic: Negative.    Neurological: Negative.    Hematological: Negative.    Psychiatric/Behavioral: Negative.        Physical Exam:     Physical Exam   Constitutional: He is oriented to person, place, and time. He appears well-developed and well-nourished.   HENT:   Head: Normocephalic and atraumatic.   Eyes: Pupils are equal, round, and reactive to light. Conjunctivae and EOM are normal.   Black eye over-right   Neck: Normal range of motion.   Cardiovascular: Normal rate, regular rhythm, normal heart sounds and intact distal pulses.   Pulmonary/Chest: Effort normal and breath sounds normal.   Abdominal: Soft. Bowel sounds are normal.   Musculoskeletal: Normal range of motion.   Neurological: He is alert and oriented to person, place, and time. He has normal reflexes.   Skin: Skin is warm and dry.   Psychiatric: He has a normal mood and affect. His behavior is normal. Judgment and thought content normal.   Nursing note and vitals reviewed.      I have reviewed the following portions of the  patient's history: allergies, current medications, past family history, past medical history, past social history, past surgical history, problem list and ROS and confirm it's accurate.      Procedure:       Assessment/Plan:   Dysuria-severe despite negative urine culture I suspect were dealing with another bladder stone.  He has been very noncompliant with regards to his medication until just recently.            Patient's Body mass index is 30.96 kg/m². BMI is above normal parameters. Recommendations include: educational material.              This document has been electronically signed by TRACE BELLO MD January 20, 2020 1:08 PM

## 2020-01-21 PROBLEM — R30.0 DYSURIA: Status: ACTIVE | Noted: 2020-01-21

## 2020-01-22 LAB
BACTERIA UR CULT: NORMAL
BACTERIA UR CULT: NORMAL

## 2020-02-06 ENCOUNTER — HOSPITAL ENCOUNTER (OUTPATIENT)
Dept: GENERAL RADIOLOGY | Facility: HOSPITAL | Age: 65
Discharge: HOME | End: 2020-02-06
Admitting: NURSE PRACTITIONER

## 2020-02-06 ENCOUNTER — OFFICE VISIT (OUTPATIENT)
Dept: UROLOGY | Facility: CLINIC | Age: 65
End: 2020-02-06

## 2020-02-06 VITALS
DIASTOLIC BLOOD PRESSURE: 72 MMHG | BODY MASS INDEX: 32.9 KG/M2 | SYSTOLIC BLOOD PRESSURE: 138 MMHG | WEIGHT: 235 LBS | HEIGHT: 71 IN

## 2020-02-06 DIAGNOSIS — N39.0 RECURRENT UTI: ICD-10-CM

## 2020-02-06 DIAGNOSIS — N20.0 KIDNEY STONE: Primary | ICD-10-CM

## 2020-02-06 DIAGNOSIS — N42.9 DISORDER OF PROSTATE: ICD-10-CM

## 2020-02-06 DIAGNOSIS — R31.0 GROSS HEMATURIA: ICD-10-CM

## 2020-02-06 DIAGNOSIS — N32.89 BLADDER SPASMS: ICD-10-CM

## 2020-02-06 DIAGNOSIS — R53.83 OTHER FATIGUE: ICD-10-CM

## 2020-02-06 LAB
BILIRUB BLD-MCNC: ABNORMAL MG/DL
CLARITY, POC: CLEAR
COLOR UR: YELLOW
GLUCOSE UR STRIP-MCNC: NEGATIVE MG/DL
KETONES UR QL: NEGATIVE
LEUKOCYTE EST, POC: ABNORMAL
NITRITE UR-MCNC: POSITIVE MG/ML
PH UR: 6.5 [PH] (ref 5–8)
PROT UR STRIP-MCNC: ABNORMAL MG/DL
RBC # UR STRIP: ABNORMAL /UL
SP GR UR: 1.01 (ref 1–1.03)
UROBILINOGEN UR QL: NORMAL

## 2020-02-06 PROCEDURE — 74018 RADEX ABDOMEN 1 VIEW: CPT | Performed by: RADIOLOGY

## 2020-02-06 PROCEDURE — 74018 RADEX ABDOMEN 1 VIEW: CPT

## 2020-02-06 PROCEDURE — 81003 URINALYSIS AUTO W/O SCOPE: CPT | Performed by: NURSE PRACTITIONER

## 2020-02-06 PROCEDURE — 36415 COLL VENOUS BLD VENIPUNCTURE: CPT | Performed by: NURSE PRACTITIONER

## 2020-02-06 PROCEDURE — 51798 US URINE CAPACITY MEASURE: CPT | Performed by: NURSE PRACTITIONER

## 2020-02-06 PROCEDURE — 99214 OFFICE O/P EST MOD 30 MIN: CPT | Performed by: NURSE PRACTITIONER

## 2020-02-06 RX ORDER — PHENAZOPYRIDINE HYDROCHLORIDE 200 MG/1
200 TABLET, FILM COATED ORAL 3 TIMES DAILY PRN
Qty: 20 TABLET | Refills: 0 | Status: SHIPPED | OUTPATIENT
Start: 2020-02-06 | End: 2020-06-05 | Stop reason: SDUPTHER

## 2020-02-06 RX ORDER — TRAMADOL HYDROCHLORIDE 50 MG/1
TABLET ORAL
COMMUNITY
Start: 2020-01-28 | End: 2020-05-29

## 2020-02-06 NOTE — PROGRESS NOTES
Chief Complaint:          Chief Complaint   Patient presents with   • Recurrent UTI   • Abdominal Pain       HPI:   64 y.o. male.    Patient presents to the clinic today with gross hematuria.  He reports this has been ongoing for the last 2 days.  He reports urinary symptoms of frequency, urgency, and burning on urination.  He has nocturia 4-5 times, suprapubic pain and discomfort, denies back pain or flank pain, denies chills no fevers, he does not have any CVA tenderness.  Patient is currently on antibiotic prophylaxis with Macrobid.  He has been taking it diligently.    Patient has a significant medical history as listed below.  He has a significantly enlarged prostate, will recurrent episodes of gross hematuria.  He was placed on maximum therapy with finasteride and Flomax. He has been noncompliant, but occasionally states he is now taking the medication as prescribed.  He has been scheduled for cystoscopy with Dr. Uriarte in March.      We will send his urine out for culture today, tell him to continue his antibiotic therapy unless otherwise stated.  I will call him with with culture results and determine his plan of care from then. His IPSS score for urological symptom questionnaire 20, his PVR is 10    We will get a KUB, and possibly a CT of his abdomen and pelvis if his KUB becomes negative for stones    Past Medical History:        Past Medical History:   Diagnosis Date   • Arthritis    • Bladder stones    • BPH (benign prostatic hyperplasia)    • Cancer (CMS/[facility])     skin ca- felix   • Claustrophobia    • COPD (chronic obstructive pulmonary disease) (CMS/[facility])    • Coronary artery disease    • Diabetes (CMS/[facility])    • Difficulty urinating    • Fracture, finger    • Hematuria    • High cholesterol    • History of sepsis    • Hypertension    • MI (myocardial infarction) (CMS/[facility])    • Prostate disease    • Stroke (CMS/[facility])     X3 last one 9/2016   TIA   • TIA (transient ischemic attack)    • UTI (urinary tract  infection)          The following portions of the patient's history were reviewed and updated as appropriate: allergies, current medications, past family history, past medical history, past social history, past surgical history and problem list.    Current Meds:     Current Outpatient Medications   Medication Sig Dispense Refill   • aspirin  MG tablet Take 325 mg by mouth Every 6 (Six) Hours As Needed.     • Blood Glucose Monitoring Suppl (ACCU-CHEK JOSE G) device Use as directed to test blood sugar three times daily and and bedtime. 1 each 0   • diclofenac (VOLTAREN) 75 MG EC tablet Take 1 tablet by mouth 2 (Two) Times a Day. 30 tablet 0   • doxazosin (CARDURA) 8 MG tablet Take 8 mg by mouth Every Morning.     • finasteride (PROSCAR) 5 MG tablet Take 1 tablet by mouth Daily. 30 tablet 5   • gabapentin (NEURONTIN) 600 MG tablet      • glimepiride (AMARYL) 4 MG tablet Take 4 mg by mouth Every Morning Before Breakfast.     • glucose blood test strip Use as directed to test blood sugar three times daily and and bedtime. 120 each 0   • HYDROcodone-acetaminophen (NORCO) 5-325 MG per tablet Take 1 tablet by mouth Every 6 (Six) Hours As Needed for Moderate Pain . 12 tablet 0   • insulin aspart (novoLOG FLEXPEN) 100 UNIT/ML solution pen-injector sc pen Use as directed 4 times daily per sliding scale (0 to 7 units/dose). (Patient taking differently: 2 (Two) Times a Day.) 15 mL 0   • insulin detemir (LEVEMIR) 100 UNIT/ML injection Inject 15 Units under the skin Daily. 15 mL 0   • Insulin Pen Needle 29G X 12.7MM misc Use as directed with insulin three times daily with meals and at bedtime. 120 each 0   • metFORMIN (GLUCOPHAGE) 500 MG tablet      • nitrofurantoin, macrocrystal-monohydrate, (MACROBID) 100 MG capsule Take 1 capsule by mouth 2 (Two) Times a Day. 56 capsule 3   • pantoprazole (PROTONIX) 40 MG EC tablet Take 40 mg by mouth daily.     • polyethylene glycol (MIRALAX) pack packet Mix 17 g with liquid of choice and  drink   Daily. 500 g 0   • tamsulosin (FLOMAX) 0.4 MG capsule 24 hr capsule TAKE ONE CAPSULE BY MOUTH EVERY NIGHT 30 capsule 4   • traMADol (ULTRAM) 50 MG tablet      • TRUEPLUS LANCETS 33G misc Use as directed to test blood sugar three times daily and and bedtime. 120 each 0   • zolpidem (AMBIEN) 10 MG tablet Take 10 mg by mouth at night as needed.     • Clopidogrel Bisulfate (PLAVIX PO) Take 75 mg by mouth Daily.     • furosemide (LASIX) 20 MG tablet As Needed.       No current facility-administered medications for this visit.         Allergies:      No Known Allergies     Past Surgical History:     Past Surgical History:   Procedure Laterality Date   • CARDIAC CATHETERIZATION  03/03/2014   • CARDIAC SURGERY     • CARDIOVASCULAR STRESS TEST  04/04/2013   • CATARACT EXTRACTION     • CEREBRAL ANEURYSM REPAIR     • COLONOSCOPY     • CORONARY ANGIOPLASTY WITH STENT PLACEMENT     • CYSTOSCOPY LITHOLAPAXY BLADDER STONE EXTRACTION N/A 11/29/2017    Procedure: CYSTOSCOPY LITHOLAPAXY BLADDER STONE EXTRACTION;  Surgeon: Flavio Barrios MD;  Location: [facility];  Service:    • ECHO - CONVERTED  02/02/2014   • EYE SURGERY     • INNER EAR SURGERY     • SKIN BIOPSY     • TONSILLECTOMY           Social History:     Social History     Socioeconomic History   • Marital status: Single     Spouse name: Not on file   • Number of children: Not on file   • Years of education: Not on file   • Highest education level: Not on file   Tobacco Use   • Smoking status: Current Every Day Smoker     Packs/day: 1.00     Years: 45.00     Pack years: 45.00   • Smokeless tobacco: Never Used   Substance and Sexual Activity   • Alcohol use: No   • Drug use: No   • Sexual activity: Defer     Birth control/protection: None       Family History:     Family History   Problem Relation Age of Onset   • Osteoporosis Mother    • Cancer Mother         breast and lung   • Cancer Father         small cell cancer, kidney   • Heart disease Father    •  Diabetes Father    • No Known Problems Sister    • Aneurysm Brother        Review of Systems:    Review of Systems   Constitutional: Negative for activity change, appetite change, chills, fatigue and fever.   HENT: Negative for congestion, dental problem, mouth sores, postnasal drip, sinus pressure, sore throat and trouble swallowing.    Eyes: Negative for blurred vision and double vision.   Respiratory: Negative for shortness of breath and wheezing.    Cardiovascular: Negative for chest pain, palpitations and leg swelling.   Gastrointestinal: Positive for constipation and nausea. Negative for abdominal pain, diarrhea and vomiting.   Genitourinary: Positive for dysuria, frequency, nocturia, urgency and urinary incontinence. Negative for difficulty urinating, discharge, flank pain, genital sores, hematuria, penile pain, penile swelling, scrotal swelling and testicular pain.   Musculoskeletal: Negative for back pain.   Skin: Positive for dry skin and pallor.        Vitiligo-like spots on skin bilateral arms and chest   Neurological: Negative for dizziness, weakness and confusion.   Psychiatric/Behavioral: Negative for agitation, behavioral problems and decreased concentration.      IPSS Questionnaire (AUA-7):  Over the past month…    1)  How often have you had a sensation of not emptying your bladder completely after you finish urinating?  3 - About half the time   2)  How often have you had to urinate again less than two hours after you finished urinating? 3 - About half the time   3)  How often have you found you stopped and started again several times when you urinated?  3 - About half the time   4) How difficult have you found it to postpone urination?  1 - Less than 1 time in 5   5) How often have you had a weak urinary stream?  3 - About half the time   6) How often have you had to push or strain to begin urination?  3 - About half the time   7) How many times did you most typically get up to urinate from the  time you went to bed until the time you got up in the morning?  4 - 4 times   Total score:  0-7 mildly symptomatic    8-19 moderately symptomatic    20-35 severely symptomatic                                               20         Physical Exam:     Physical Exam   Constitutional: He is oriented to person, place, and time. He appears well-developed and well-nourished. No distress.   HENT:   Head: Normocephalic and atraumatic.   Right Ear: External ear normal.   Left Ear: External ear normal.   Eyes: Pupils are equal, round, and reactive to light. Conjunctivae and EOM are normal. Right eye exhibits no discharge. Left eye exhibits no discharge.   Neck: Normal range of motion. Neck supple. No tracheal deviation present. No thyromegaly present.   Cardiovascular: Normal rate and regular rhythm. Exam reveals no friction rub.   No murmur heard.  Pulmonary/Chest: Effort normal and breath sounds normal. No stridor. No respiratory distress.   Abdominal: Soft. Bowel sounds are normal. He exhibits distension. There is tenderness. There is no guarding.   Genitourinary: Rectum normal, testes normal and penis normal. Rectal exam shows guaiac negative stool. Uncircumcised. No penile tenderness. No discharge found.   Musculoskeletal: Normal range of motion. He exhibits no edema, tenderness or deformity.   Neurological: He is alert and oriented to person, place, and time. No cranial nerve deficit. Coordination normal.   Skin: Skin is warm and dry. Capillary refill takes less than 2 seconds. Rash noted. There is pallor.   Vitiligo spots on skin bilateral arms and chest   Psychiatric: He has a normal mood and affect. His behavior is normal. Judgment and thought content normal.       Procedure:     No notes on file      Assessment:     Encounter Diagnosis   Name Primary?   • Kidney stone Yes     Orders Placed This Encounter   Procedures   • XR Abdomen KUB     Order Specific Question:   Reason for Exam:     Answer:   kidney or bladder  stone       Plan:   Recurrent UTI's/Gross Hematuria: Patient presents to clinic with concerns of a recurrent urinary tract infection.  Gross hematuria has been ongoing for 2 days.  He is currently on antibiotic prophylaxis with Macrobid for recurrent urinary tract infections.     We discussed the types of organisms that are found in the urinary tract indicating that the vast majority are results of the patient's own gastrointestinal jerrica.  We discussed how many of the antibiotics that are utilized can actually exacerbate these infections by creating resistant organism patient s and there is only a very few antibiotics that are concentrated in the urine and do not affect the rectal reservoir nor cause recurrent yeast vaginitis.      We discussed the risk factors for recurrent infections being intercourse in younger patients and atrophic changes in older patients.  We discussed the symptoms that are found including pain, pressure, burning, frequency, urgency suprapubic pain .     I discussed upper tract symptoms including fevers, chills, and indicated the workup would be much more aggressive if the patient were to present with recurrent infections in the face of upper tract symptomatology such as fever.      I recommend concomitant probiotics with treatment with antibiotics to protect the rectal reservoir including over-the-counter yogurt preparations to marcy oral pills containing the appropriate probiotics.    We also Discussed Microscopic VS Gross Hematuria with patient.  He has been very symptomatic and has some gross hematuria today his urine is completely bloody and dark. We discussed the possible causes such as infection in the bladder, kidney, or bladder discomfort, trauma. vigorous exercise, viral illness, such as hepatitis a virus that causes liver disease and inflammation of the liver.     We discussed the use of both an upper and lower tract investigation.  I discussed the fact that an upper tract  investigation includes a normal renal ultrasound with a significant risks of missing more subtle lesions.  Progressing to a CT scan without contrast and finally the CT scan with contrast being the gold standard to diagnose the small neoplasms.  We discussed the lower tract investigation consisting of a cystoscopy. Patient is very familiar with these work-ups.    We both reviewed and discussed his KUB today which was negative for bladder stones. One view of the abdomen shows moderate-to-large volume stool in the colon.  Because we cannot exclude calcifications in the left kidney,    Will Schedule CT abdomen and pelvis with and without contrast.      We will send his urine out for culture we will call patient with results.    Get some labs CBC make sure is not losing most blood PSA and a BMP check his kidney function.    He is currently on antibiotic prophylaxis with Macrobid is to continue that.        Patient has been scheduled  for Cystoscopy with Dr. Barrios on March 12, 2020    We will see him back in 2 weeks to review CT scan results    Patient's Body mass index is 32.78 kg/m². BMI is above normal parameters. Recommendations include: educational material, exercise counseling and nutrition counseling.      Smoking Cessation Counseling:  Current every day smoker. less than 3 minutes spent counseling. Will try to cut down.  I advised patient to quit tobacco use and offered support.     Counseling was given to patient for the following topics diagnostic results including: Gross hematuria, recurrent UTIs, risk factor reductions including: Smoking cessation, weight and bladder irritants such as caffeine, nicotine, avoiding exposure to secondhand smoke and impressions as follows: Send urine out for culture, get labs CBC, BMP, PSA, get KUB, CT abdomen and pelvis.. The interim medical history and current results were reviewed.  A treatment plan with follow-up was made for Kidney stone [N20.0].            This document  has been electronically signed by Griselda Cheng-Akwa, APRN February 6, 2020 10:50 AM

## 2020-02-06 NOTE — PATIENT INSTRUCTIONS
Urinary Tract Infection, Adult  A urinary tract infection (UTI) is an infection of any part of the urinary tract. The urinary tract includes:  · The kidneys.  · The ureters.  · The bladder.  · The urethra.  These organs make, store, and get rid of pee (urine) in the body.  What are the causes?  This is caused by germs (bacteria) in your genital area. These germs grow and cause swelling (inflammation) of your urinary tract.  What increases the risk?  You are more likely to develop this condition if:  · You have a small, thin tube (catheter) to drain pee.  · You cannot control when you pee or poop (incontinence).  · You are female, and:  ? You use these methods to prevent pregnancy:  ? A medicine that kills sperm (spermicide).  ? A device that blocks sperm (diaphragm).  ? You have low levels of a female hormone (estrogen).  ? You are pregnant.  · You have genes that add to your risk.  · You are sexually active.  · You take antibiotic medicines.  · You have trouble peeing because of:  ? A prostate that is bigger than normal, if you are male.  ? A blockage in the part of your body that drains pee from the bladder (urethra).  ? A kidney stone.  ? A nerve condition that affects your bladder (neurogenic bladder).  ? Not getting enough to drink.  ? Not peeing often enough.  · You have other conditions, such as:  ? Diabetes.  ? A weak disease-fighting system (immune system).  ? Sickle cell disease.  ? Gout.  ? Injury of the spine.  What are the signs or symptoms?  Symptoms of this condition include:  · Needing to pee right away (urgently).  · Peeing often.  · Peeing small amounts often.  · Pain or burning when peeing.  · Blood in the pee.  · Pee that smells bad or not like normal.  · Trouble peeing.  · Pee that is cloudy.  · Fluid coming from the vagina, if you are female.  · Pain in the belly or lower back.  Other symptoms include:  · Throwing up (vomiting).  · No urge to eat.  · Feeling mixed up (confused).  · Being tired  and grouchy (irritable).  · A fever.  · Watery poop (diarrhea).  How is this treated?  This condition may be treated with:  · Antibiotic medicine.  · Other medicines.  · Drinking enough water.  Follow these instructions at home:    Medicines  · Take over-the-counter and prescription medicines only as told by your doctor.  · If you were prescribed an antibiotic medicine, take it as told by your doctor. Do not stop taking it even if you start to feel better.  General instructions  · Make sure you:  ? Pee until your bladder is empty.   ? Do not hold pee for a long time.  ? Empty your bladder after sex.  ? Wipe from front to back after pooping if you are a female. Use each tissue one time when you wipe.  · Drink enough fluid to keep your pee pale yellow.  · Keep all follow-up visits as told by your doctor. This is important.  Contact a doctor if:  · You do not get better after 1-2 days.  · Your symptoms go away and then come back.  Get help right away if:  · You have very bad back pain.  · You have very bad pain in your lower belly.  · You have a fever.  · You are sick to your stomach (nauseous).  · You are throwing up.  Summary  · A urinary tract infection (UTI) is an infection of any part of the urinary tract.  · This condition is caused by germs in your genital area.  · There are many risk factors for a UTI. These include having a small, thin tube to drain pee and not being able to control when you pee or poop.  · Treatment includes antibiotic medicines for germs.  · Drink enough fluid to keep your pee pale yellow.  This information is not intended to replace advice given to you by your health care provider. Make sure you discuss any questions you have with your health care provider.  Document Released: 06/05/2009 Document Revised: 06/27/2019 Document Reviewed: 06/27/2019  [employer] Interactive Patient Education © 2019 [employer]  Hematuria, Adult  Hematuria is blood in the urine. Blood may be visible in the urine,  or it may be identified with a test. This condition can be caused by infections of the bladder, urethra, kidney, or prostate. Other possible causes include:  · Kidney stones.  · Cancer of the urinary tract.  · Too much calcium in the urine.  · Conditions that are passed from parent to child (inherited conditions).  · Exercise that requires a lot of energy.  Infections can usually be treated with medicine, and a kidney stone usually will pass through your urine. If neither of these is the cause of your hematuria, more tests may be needed to identify the cause of your symptoms.  It is very important to tell your health care provider about any blood in your urine, even if it is painless or the blood stops without treatment. Blood in the urine, when it happens and then stops and then happens again, can be a symptom of a very serious condition, including cancer. There is no pain in the initial stages of many urinary cancers.  Follow these instructions at home:  Medicines  · Take over-the-counter and prescription medicines only as told by your health care provider.  · If you were prescribed an antibiotic medicine, take it as told by your health care provider. Do not stop taking the antibiotic even if you start to feel better.  Eating and drinking  · Drink enough fluid to keep your urine clear or pale yellow. It is recommended that you drink 3-4 quarts (2.8-3.8 L) a day. If you have been diagnosed with an infection, it is recommended that you drink cranberry juice in addition to large amounts of water.  · Avoid caffeine, tea, and carbonated beverages. These tend to irritate the bladder.  · Avoid alcohol because it may irritate the prostate (men).  General instructions  · If you have been diagnosed with a kidney stone, follow your health care provider's instructions about straining your urine to catch the stone.  · Empty your bladder often. Avoid holding urine for long periods of time.  · If you are female:  ? After a bowel  movement, wipe from front to back and use each piece of toilet paper only once.  ? Empty your bladder before and after sex.  · Pay attention to any changes in your symptoms. Tell your health care provider about any changes or any new symptoms.  · It is your responsibility to get your test results. Ask your health care provider, or the department performing the test, when your results will be ready.  · Keep all follow-up visits as told by your health care provider. This is important.  Contact a health care provider if:  · You develop back pain.  · You have a fever.  · You have nausea or vomiting.  · Your symptoms do not improve after 3 days.  · Your symptoms get worse.  Get help right away if:  · You develop severe vomiting and are unable take medicine without vomiting.  · You develop severe pain in your back or abdomen even though you are taking medicine.  · You pass a large amount of blood in your urine.  · You pass blood clots in your urine.  · You feel very weak or like you might faint.  · You faint.  Summary  · Hematuria is blood in the urine. It has many possible causes.  · It is very important that you tell your health care provider about any blood in your urine, even if it is painless or the blood stops without treatment.  · Take over-the-counter and prescription medicines only as told by your health care provider.  · Drink enough fluid to keep your urine clear or pale yellow.  This information is not intended to replace advice given to you by your health care provider. Make sure you discuss any questions you have with your health care provider.  Document Released: 12/18/2006 Document Revised: 01/20/2018 Document Reviewed: 01/20/2018  [employer] Interactive Patient Education © 2019 [employer]

## 2020-02-07 LAB
BUN SERPL-MCNC: 7 MG/DL (ref 8–23)
BUN/CREAT SERPL: 6.9 (ref 7–25)
CALCIUM SERPL-MCNC: 8.6 MG/DL (ref 8.6–10.5)
CHLORIDE SERPL-SCNC: 100 MMOL/L (ref 98–107)
CO2 SERPL-SCNC: 21.3 MMOL/L (ref 22–29)
CREAT SERPL-MCNC: 1.02 MG/DL (ref 0.76–1.27)
ERYTHROCYTE [DISTWIDTH] IN BLOOD BY AUTOMATED COUNT: 17 % (ref 12.3–15.4)
GLUCOSE SERPL-MCNC: 187 MG/DL (ref 65–99)
HCT VFR BLD AUTO: 32.2 % (ref 37.5–51)
HGB BLD-MCNC: 10 G/DL (ref 13–17.7)
MCH RBC QN AUTO: 24.4 PG (ref 26.6–33)
MCHC RBC AUTO-ENTMCNC: 31.1 G/DL (ref 31.5–35.7)
MCV RBC AUTO: 78.7 FL (ref 79–97)
PLATELET # BLD AUTO: 233 10*3/MM3 (ref 140–450)
POTASSIUM SERPL-SCNC: 4.4 MMOL/L (ref 3.5–5.2)
PSA SERPL-MCNC: 1.47 NG/ML (ref 0–4)
RBC # BLD AUTO: 4.09 10*6/MM3 (ref 4.14–5.8)
SODIUM SERPL-SCNC: 133 MMOL/L (ref 136–145)
WBC # BLD AUTO: 8.49 10*3/MM3 (ref 3.4–10.8)

## 2020-02-08 LAB
BACTERIA UR CULT: NORMAL
BACTERIA UR CULT: NORMAL

## 2020-02-11 ENCOUNTER — TELEPHONE (OUTPATIENT)
Dept: UROLOGY | Facility: CLINIC | Age: 65
End: 2020-02-11

## 2020-02-11 NOTE — TELEPHONE ENCOUNTER
Called patient to discuss urine culture results and see how well he was doing. Also to check if his gross hematuria had resolved.    His urine culture was negative for growth, just mixed jerrica. He should continue his antibiotics as discussed.    See him at follow up.

## 2020-02-19 ENCOUNTER — HOSPITAL ENCOUNTER (OUTPATIENT)
Dept: CT IMAGING | Facility: HOSPITAL | Age: 65
Discharge: HOME | End: 2020-02-19

## 2020-02-19 DIAGNOSIS — N39.0 RECURRENT UTI: ICD-10-CM

## 2020-02-19 DIAGNOSIS — R31.0 GROSS HEMATURIA: ICD-10-CM

## 2020-02-28 ENCOUNTER — HOSPITAL ENCOUNTER (OUTPATIENT)
Dept: CT IMAGING | Facility: HOSPITAL | Age: 65
Discharge: HOME | End: 2020-02-28
Admitting: NURSE PRACTITIONER

## 2020-02-28 DIAGNOSIS — R31.0 GROSS HEMATURIA: ICD-10-CM

## 2020-02-28 DIAGNOSIS — N20.0 KIDNEY STONE: ICD-10-CM

## 2020-02-28 DIAGNOSIS — N42.9 DISORDER OF PROSTATE: ICD-10-CM

## 2020-02-28 PROCEDURE — 74178 CT ABD&PLV WO CNTR FLWD CNTR: CPT | Performed by: RADIOLOGY

## 2020-02-28 PROCEDURE — 74178 CT ABD&PLV WO CNTR FLWD CNTR: CPT

## 2020-02-28 PROCEDURE — 25010000002 IOPAMIDOL 61 % SOLUTION: Performed by: NURSE PRACTITIONER

## 2020-02-28 RX ADMIN — IOPAMIDOL 80 ML: 612 INJECTION, SOLUTION INTRAVENOUS at 10:12

## 2020-03-05 ENCOUNTER — OFFICE VISIT (OUTPATIENT)
Dept: UROLOGY | Facility: CLINIC | Age: 65
End: 2020-03-05

## 2020-03-05 VITALS
BODY MASS INDEX: 33.52 KG/M2 | SYSTOLIC BLOOD PRESSURE: 150 MMHG | HEIGHT: 71 IN | WEIGHT: 239.4 LBS | DIASTOLIC BLOOD PRESSURE: 90 MMHG

## 2020-03-05 DIAGNOSIS — N39.0 URINARY TRACT INFECTION WITHOUT HEMATURIA, SITE UNSPECIFIED: Primary | ICD-10-CM

## 2020-03-05 DIAGNOSIS — R33.9 INCOMPLETE BLADDER EMPTYING: ICD-10-CM

## 2020-03-05 DIAGNOSIS — N40.1 BPH WITH OBSTRUCTION/LOWER URINARY TRACT SYMPTOMS: ICD-10-CM

## 2020-03-05 DIAGNOSIS — N13.8 BPH WITH OBSTRUCTION/LOWER URINARY TRACT SYMPTOMS: ICD-10-CM

## 2020-03-05 LAB
BILIRUB BLD-MCNC: NEGATIVE MG/DL
CLARITY, POC: CLEAR
COLOR UR: YELLOW
GLUCOSE UR STRIP-MCNC: NEGATIVE MG/DL
KETONES UR QL: NEGATIVE
LEUKOCYTE EST, POC: ABNORMAL
NITRITE UR-MCNC: NEGATIVE MG/ML
PH UR: 6.5 [PH] (ref 5–8)
PROT UR STRIP-MCNC: NEGATIVE MG/DL
RBC # UR STRIP: NEGATIVE /UL
SP GR UR: 1.01 (ref 1–1.03)
UROBILINOGEN UR QL: NORMAL

## 2020-03-05 PROCEDURE — 99214 OFFICE O/P EST MOD 30 MIN: CPT | Performed by: NURSE PRACTITIONER

## 2020-03-05 PROCEDURE — 81003 URINALYSIS AUTO W/O SCOPE: CPT | Performed by: NURSE PRACTITIONER

## 2020-03-05 PROCEDURE — 51798 US URINE CAPACITY MEASURE: CPT | Performed by: NURSE PRACTITIONER

## 2020-03-05 NOTE — PROGRESS NOTES
Chief Complaint:          ***    HPI:   64 y.o. male.    HPI      Past Medical History:        Past Medical History:   Diagnosis Date   • Arthritis    • Bladder stones    • BPH (benign prostatic hyperplasia)    • Cancer (CMS/HCC)     skin ca- felix   • Claustrophobia    • COPD (chronic obstructive pulmonary disease) (CMS/HCC)    • Coronary artery disease    • Diabetes (CMS/HCC)    • Difficulty urinating    • Fracture, finger    • Hematuria    • High cholesterol    • History of sepsis    • Hypertension    • MI (myocardial infarction) (CMS/[facility])    • Prostate disease    • Stroke (CMS/HCC)     X3 last one 9/2016   TIA   • TIA (transient ischemic attack)    • UTI (urinary tract infection)          Current Meds:     Current Outpatient Medications   Medication Sig Dispense Refill   • aspirin  MG tablet Take 325 mg by mouth Every 6 (Six) Hours As Needed.     • Blood Glucose Monitoring Suppl (ACCU-CHEK JOSE G) device Use as directed to test blood sugar three times daily and and bedtime. 1 each 0   • Clopidogrel Bisulfate (PLAVIX PO) Take 75 mg by mouth Daily.     • diclofenac (VOLTAREN) 75 MG EC tablet Take 1 tablet by mouth 2 (Two) Times a Day. 30 tablet 0   • doxazosin (CARDURA) 8 MG tablet Take 8 mg by mouth Every Morning.     • finasteride (PROSCAR) 5 MG tablet Take 1 tablet by mouth Daily. 30 tablet 5   • furosemide (LASIX) 20 MG tablet As Needed.     • gabapentin (NEURONTIN) 600 MG tablet      • glimepiride (AMARYL) 4 MG tablet Take 4 mg by mouth Every Morning Before Breakfast.     • glucose blood test strip Use as directed to test blood sugar three times daily and and bedtime. 120 each 0   • HYDROcodone-acetaminophen (NORCO) 5-325 MG per tablet Take 1 tablet by mouth Every 6 (Six) Hours As Needed for Moderate Pain . 12 tablet 0   • insulin aspart (novoLOG FLEXPEN) 100 UNIT/ML solution pen-injector sc pen Use as directed 4 times daily per sliding scale (0 to 7 units/dose). (Patient taking differently: 2 (Two) Times  a Day.) 15 mL 0   • insulin detemir (LEVEMIR) 100 UNIT/ML injection Inject 15 Units under the skin Daily. 15 mL 0   • Insulin Pen Needle 29G X 12.7MM misc Use as directed with insulin three times daily with meals and at bedtime. 120 each 0   • metFORMIN (GLUCOPHAGE) 500 MG tablet      • nitrofurantoin, macrocrystal-monohydrate, (MACROBID) 100 MG capsule Take 1 capsule by mouth 2 (Two) Times a Day. 56 capsule 3   • pantoprazole (PROTONIX) 40 MG EC tablet Take 40 mg by mouth daily.     • phenazopyridine (PYRIDIUM) 200 MG tablet Take 1 tablet by mouth 3 (Three) Times a Day As Needed for Bladder Spasms. 20 tablet 0   • polyethylene glycol (MIRALAX) pack packet Mix 17 g with liquid of choice and drink   Daily. 500 g 0   • tamsulosin (FLOMAX) 0.4 MG capsule 24 hr capsule TAKE ONE CAPSULE BY MOUTH EVERY NIGHT 30 capsule 4   • traMADol (ULTRAM) 50 MG tablet      • TRUEPLUS LANCETS 33G misc Use as directed to test blood sugar three times daily and and bedtime. 120 each 0   • zolpidem (AMBIEN) 10 MG tablet Take 10 mg by mouth at night as needed.       No current facility-administered medications for this visit.         Allergies:      No Known Allergies     Past Surgical History:     Past Surgical History:   Procedure Laterality Date   • CARDIAC CATHETERIZATION  03/03/2014   • CARDIAC SURGERY     • CARDIOVASCULAR STRESS TEST  04/04/2013   • CATARACT EXTRACTION     • CEREBRAL ANEURYSM REPAIR     • COLONOSCOPY     • CORONARY ANGIOPLASTY WITH STENT PLACEMENT     • CYSTOSCOPY LITHOLAPAXY BLADDER STONE EXTRACTION N/A 11/29/2017    Procedure: CYSTOSCOPY LITHOLAPAXY BLADDER STONE EXTRACTION;  Surgeon: Flavio Barrios MD;  Location: [facility];  Service:    • ECHO - CONVERTED  02/02/2014   • EYE SURGERY     • INNER EAR SURGERY     • SKIN BIOPSY     • TONSILLECTOMY           Social History:     Social History     Socioeconomic History   • Marital status: Single     Spouse name: Not on file   • Number of children: Not on file   •  "Years of education: Not on file   • Highest education level: Not on file   Tobacco Use   • Smoking status: Current Every Day Smoker     Packs/day: 1.00     Years: 45.00     Pack years: 45.00   • Smokeless tobacco: Never Used   Substance and Sexual Activity   • Alcohol use: No   • Drug use: No   • Sexual activity: Defer     Birth control/protection: None       Family History:     Family History   Problem Relation Age of Onset   • Osteoporosis Mother    • Cancer Mother         breast and lung   • Cancer Father         small cell cancer, kidney   • Heart disease Father    • Diabetes Father    • No Known Problems Sister    • Aneurysm Brother        Review of Systems:     Review of Systems   Constitutional: Negative for chills, fatigue and fever.   HENT: Negative for congestion and sinus pressure.    Respiratory: Negative for chest tightness and shortness of breath.    Cardiovascular: Negative for chest pain.   Gastrointestinal: Negative for abdominal pain, constipation, diarrhea, nausea and vomiting.   Genitourinary: Negative for flank pain, frequency, hematuria and urgency.   Musculoskeletal: Negative for back pain and neck pain.   Skin: Negative for rash.   Neurological: Negative for dizziness and headaches.   Hematological: Does not bruise/bleed easily.   Psychiatric/Behavioral: The patient is not nervous/anxious.              {Review Statement:24805}  Physical Exam:     Physical Exam    Ht 180.3 cm (71\")   BMI 32.78 kg/m²    Procedure:         Assessment:   No diagnosis found.    No orders of the defined types were placed in this encounter.      Patient reports that he is not currently experiencing any symptoms of urinary incontinence.      Plan:   ***    Patient's Body mass index is 32.78 kg/m². BMI is {BMI range:63808}.      Smoking Cessation Counseling:  {Smoking Cessation Status and Time:26700}  {Smoking Cessation COR:72223}    During this visit, I spent *** minutes counseling Jovanni regarding tobacco cessation. "        Counseling was given to {person:[id number]} for the following topics {topic:84919}. The interim medical history and current results were reviewed.  A treatment plan with follow-up was made for No primary diagnosis found.. I spent *** minutes face to face with Jovanni Richter and *** percentage was spent in counseling.       This document has been electronically signed by Trevor Valdes MD March 5, 2020 1:58 PM

## 2020-03-05 NOTE — PROGRESS NOTES
Chief Complaint:          Chief Complaint   Patient presents with   • BPH With Urine Retention/UTI     Review CT Scan Results       HPI:   64 y.o. male.    Patient returns today for follow-up, and to review his CT scan results.    He reports increased urinary symptoms recently, he reports not feeling well, and tired from having to use the bathroom so much.  He reports urinary frequency, burning on urination, pelvic pressure and discomfort.  He has urgency, but reports only voiding bits at a time. Most nights, he reports incontinent episodes, and has been wearing briefs recently he states. His gross hematuria has resolved, he denies fevers or chills, he denies nausea, vomiting, and diarrhea.  He does not have CVA tenderness.    He continues antibiotic prophylaxis with Macrobid.  He is on maximum therapy for his prostate with Flomax and finasteride daily, which she states is taking diligently.  His IPSS score today is 20, his post void residual is greater than 200, his urinalysis is positive for leukocytes, negative nitrites and 1+ microscopic hematuria.      We will send his urine out for culture.    Past Medical History:        Past Medical History:   Diagnosis Date   • Arthritis    • Bladder stones    • BPH (benign prostatic hyperplasia)    • Cancer (CMS/HCC)     skin ca- felix   • Claustrophobia    • COPD (chronic obstructive pulmonary disease) (CMS/[facility])    • Coronary artery disease    • Diabetes (CMS/HCC)    • Difficulty urinating    • Fracture, finger    • Hematuria    • High cholesterol    • History of sepsis    • Hypertension    • MI (myocardial infarction) (CMS/[facility])    • Prostate disease    • Stroke (CMS/[facility])     X3 last one 9/2016   TIA   • TIA (transient ischemic attack)    • UTI (urinary tract infection)          The following portions of the patient's history were reviewed and updated as appropriate: allergies, current medications, past family history, past medical history, past social history, past  surgical history and problem list.    Current Meds:     Current Outpatient Medications   Medication Sig Dispense Refill   • aspirin  MG tablet Take 325 mg by mouth Every 6 (Six) Hours As Needed.     • Blood Glucose Monitoring Suppl (ACCU-CHEK JOSE G) device Use as directed to test blood sugar three times daily and and bedtime. 1 each 0   • Clopidogrel Bisulfate (PLAVIX PO) Take 75 mg by mouth Daily.     • diclofenac (VOLTAREN) 75 MG EC tablet Take 1 tablet by mouth 2 (Two) Times a Day. 30 tablet 0   • doxazosin (CARDURA) 8 MG tablet Take 8 mg by mouth Every Morning.     • finasteride (PROSCAR) 5 MG tablet Take 1 tablet by mouth Daily. 30 tablet 5   • furosemide (LASIX) 20 MG tablet As Needed.     • gabapentin (NEURONTIN) 600 MG tablet      • glimepiride (AMARYL) 4 MG tablet Take 4 mg by mouth Every Morning Before Breakfast.     • glucose blood test strip Use as directed to test blood sugar three times daily and and bedtime. 120 each 0   • HYDROcodone-acetaminophen (NORCO) 5-325 MG per tablet Take 1 tablet by mouth Every 6 (Six) Hours As Needed for Moderate Pain . 12 tablet 0   • insulin aspart (novoLOG FLEXPEN) 100 UNIT/ML solution pen-injector sc pen Use as directed 4 times daily per sliding scale (0 to 7 units/dose). (Patient taking differently: 2 (Two) Times a Day.) 15 mL 0   • insulin detemir (LEVEMIR) 100 UNIT/ML injection Inject 15 Units under the skin Daily. 15 mL 0   • Insulin Pen Needle 29G X 12.7MM misc Use as directed with insulin three times daily with meals and at bedtime. 120 each 0   • metFORMIN (GLUCOPHAGE) 500 MG tablet      • nitrofurantoin, macrocrystal-monohydrate, (MACROBID) 100 MG capsule Take 1 capsule by mouth 2 (Two) Times a Day. 56 capsule 3   • pantoprazole (PROTONIX) 40 MG EC tablet Take 40 mg by mouth daily.     • phenazopyridine (PYRIDIUM) 200 MG tablet Take 1 tablet by mouth 3 (Three) Times a Day As Needed for Bladder Spasms. 20 tablet 0   • polyethylene glycol (MIRALAX) pack  packet Mix 17 g with liquid of choice and drink   Daily. 500 g 0   • tamsulosin (FLOMAX) 0.4 MG capsule 24 hr capsule TAKE ONE CAPSULE BY MOUTH EVERY NIGHT 30 capsule 4   • traMADol (ULTRAM) 50 MG tablet      • TRUEPLUS LANCETS 33G misc Use as directed to test blood sugar three times daily and and bedtime. 120 each 0   • zolpidem (AMBIEN) 10 MG tablet Take 10 mg by mouth at night as needed.       No current facility-administered medications for this visit.         Allergies:      No Known Allergies     Past Surgical History:     Past Surgical History:   Procedure Laterality Date   • CARDIAC CATHETERIZATION  03/03/2014   • CARDIAC SURGERY     • CARDIOVASCULAR STRESS TEST  04/04/2013   • CATARACT EXTRACTION     • CEREBRAL ANEURYSM REPAIR     • COLONOSCOPY     • CORONARY ANGIOPLASTY WITH STENT PLACEMENT     • CYSTOSCOPY LITHOLAPAXY BLADDER STONE EXTRACTION N/A 11/29/2017    Procedure: CYSTOSCOPY LITHOLAPAXY BLADDER STONE EXTRACTION;  Surgeon: Flavio Barrios MD;  Location: [facility];  Service:    • ECHO - CONVERTED  02/02/2014   • EYE SURGERY     • INNER EAR SURGERY     • SKIN BIOPSY     • TONSILLECTOMY           Social History:     Social History     Socioeconomic History   • Marital status: Single     Spouse name: Not on file   • Number of children: Not on file   • Years of education: Not on file   • Highest education level: Not on file   Tobacco Use   • Smoking status: Current Every Day Smoker     Packs/day: 1.00     Years: 45.00     Pack years: 45.00   • Smokeless tobacco: Never Used   Substance and Sexual Activity   • Alcohol use: No   • Drug use: No   • Sexual activity: Defer     Birth control/protection: None       Family History:     Family History   Problem Relation Age of Onset   • Osteoporosis Mother    • Cancer Mother         breast and lung   • Cancer Father         small cell cancer, kidney   • Heart disease Father    • Diabetes Father    • No Known Problems Sister    • Aneurysm Brother         Review of Systems:    Review of Systems   Constitutional: Positive for activity change and fatigue. Negative for appetite change, chills and fever.   HENT: Negative for congestion and sinus pressure.    Eyes: Negative for blurred vision and double vision.   Respiratory: Negative for shortness of breath and wheezing.    Cardiovascular: Negative for chest pain.   Gastrointestinal: Positive for abdominal distention, constipation and nausea. Negative for abdominal pain, diarrhea and vomiting.   Genitourinary: Positive for dysuria, frequency, nocturia, urgency and urinary incontinence. Negative for difficulty urinating, discharge, flank pain, genital sores, hematuria, penile pain, penile swelling, scrotal swelling and testicular pain.   Musculoskeletal: Negative for back pain and neck pain.   Skin: Positive for dry skin and pallor.   Neurological: Negative for dizziness, weakness, headache and confusion.   Hematological: Does not bruise/bleed easily.   Psychiatric/Behavioral: Positive for sleep disturbance and stress. Negative for agitation, behavioral problems and decreased concentration. The patient is not nervous/anxious.       IPSS Questionnaire (AUA-7):  Over the past month…    1)  How often have you had a sensation of not emptying your bladder completely after you finish urinating?  3 - Less than half the time   2)  How often have you had to urinate again less than two hours after you finished urinating? 4 - About half the time   3)  How often have you found you stopped and started again several times when you urinated?  3 - Not at all   4) How difficult have you found it to postpone urination?  1 - Less than 1 time in 5   5) How often have you had a weak urinary stream?  2 - Less than half the time   6) How often have you had to push or strain to begin urination?  2 - Less than half the time   7) How many times did you most typically get up to urinate from the time you went to bed until the time you got up  in the morning?  5 - 5+ times   Total score:  0-7 mildly symptomatic    8-19 moderately symptomatic    20-35 severely symptomatic                                                   20         Physical Exam:     Physical Exam   Constitutional: He is oriented to person, place, and time. He appears well-developed and well-nourished. He appears distressed.   HENT:   Head: Normocephalic and atraumatic.   Right Ear: External ear normal.   Left Ear: External ear normal.   Eyes: Pupils are equal, round, and reactive to light. Conjunctivae and EOM are normal. Right eye exhibits no discharge. Left eye exhibits no discharge.   Neck: Normal range of motion. Neck supple. No tracheal deviation present. No thyromegaly present.   Cardiovascular: Normal rate and regular rhythm. Exam reveals no friction rub.   No murmur heard.  Pulmonary/Chest: Effort normal and breath sounds normal. No stridor. No respiratory distress.   Abdominal: Soft. Bowel sounds are normal. He exhibits no distension. There is tenderness. There is no guarding.   Genitourinary: Rectum normal, testes normal and penis normal. Rectal exam shows guaiac negative stool. Uncircumcised. No penile tenderness. No discharge found.   Musculoskeletal: Normal range of motion. He exhibits tenderness. He exhibits no edema or deformity.   Neurological: He is alert and oriented to person, place, and time. No cranial nerve deficit or sensory deficit. Coordination normal.   Skin: Skin is warm and dry. Capillary refill takes less than 2 seconds. No rash noted. No erythema. There is pallor.   Psychiatric: He has a normal mood and affect. His behavior is normal. Judgment and thought content normal.       Procedure:     No notes on file      Assessment:     Encounter Diagnoses   Name Primary?   • Urinary tract infection without hematuria, site unspecified Yes   • BPH with obstruction/lower urinary tract symptoms    • Incomplete bladder emptying      Orders Placed This Encounter  "  Procedures   • Urine Culture - Urine, Urine, Clean Catch   • Bladder Scan   • POC Urinalysis Dipstick, Automated       Plan:   BPH with urinary retention/ Bladder wall thickening: Patient returns for follow-up today, and to review his CT scan report.  His IPSS score is 20, his PVR is greater than 200.  Your urine dipstick has 3+ leukocyte esterase, 1+ microscopic hematuria.    We both reviewed, and discussed his CT scan which shows marked prostate enlargement, with urinary bladder wall thickening.  This is consistent with his chronic partial bladder outlet obstruction.    There is also mild prominence of his right ureter near the level of his urinary bladder insertion which is also probably related to his chronic kidney urinary retention issues and the chronic partial bladder outlet obstruction.    Patient has been scheduled for cystoscopy with Dr. Uriarte on March 12, 2020.  However, he does not want the procedure done because \"it is uncomfortable and it hurts he states\".     Discussed different options with patient, Such as Anethetic Cystoscopy with Dr. Sophie tameze he is too scare. Patient state that he \"would prefer a TURP if he can fix my urinary retention issues\".    We will send his urine off for culture, I will call him with culture results.    He is to continue his prophylaxis with Macrobid, Flomax, and finasteride as prescribed.    Increase his p.o. fluid intake to at least 2 to 3 L daily, and avoid bladder irritants such as  smoking, caffeine products, spicy foods, and citrusy foods.    I will discuss his plan of care with Dr. Uriarte tomorrow, since he is very familiar with this patient, and update patient on the progress.     Patient agreeable to plan of care.      Patient's Body mass index is 33.39 kg/m². BMI is above normal parameters. Recommendations include: educational material, exercise counseling and nutrition counseling.      Smoking Cessation Counseling:  Current every day smoker. less than " 3 minutes spent counseling. Will try to cut down.  I advised patient to quit tobacco use and offered support.  I provided patient with tobacco cessation educational material printed in the patient's After Visit Summary.       Counseling was given to patient for the following topics diagnostic results including: BPH with urinary retention, bladder wall thickening, UTI, instructions for management as follows: Continue Flomax, continue finasteride, continue Macrobid, risk factor reductions including: Smoking cessation, avoiding bladder irritants such as caffeine products, water, coffee. and impressions as follows: Possible cystoscopy Dr. Underwood/TURP. The interim medical history and current results were reviewed.  A treatment plan with follow-up was made for BPH with Urinary tract infection without hematuria, site unspecified [N39.0].           This document has been electronically signed by Griselda Cheng-Akwa, APRN March 5, 2020 4:08 PM

## 2020-03-06 ENCOUNTER — TELEPHONE (OUTPATIENT)
Dept: UROLOGY | Facility: CLINIC | Age: 65
End: 2020-03-06

## 2020-03-06 NOTE — TELEPHONE ENCOUNTER
CALLED PATIENT AS REQUESTED. DISCUSSED HIS PLAN OF CARE AFTER TALKING WITH DR. BELLO. PATIENT DOES NOT WANT CYSTOSCOPY IN OFFICE SECONDARY TO PAIN/DISCOMFORT.    TOLD HIM DR. BELLO WILL DO ANESTHETIC CYSTOSCOPY, AND WILL CALL HIM NEXT WEEK WITH A SCHEDULE.    PATIENT APPRECIATIVE OF CARE.

## 2020-03-07 LAB
BACTERIA UR CULT: NORMAL
BACTERIA UR CULT: NORMAL

## 2020-04-27 ENCOUNTER — TELEPHONE (OUTPATIENT)
Dept: UROLOGY | Facility: CLINIC | Age: 65
End: 2020-04-27

## 2020-04-27 DIAGNOSIS — R33.9 INCOMPLETE BLADDER EMPTYING: ICD-10-CM

## 2020-04-27 RX ORDER — NITROFURANTOIN 25; 75 MG/1; MG/1
100 CAPSULE ORAL 2 TIMES DAILY
Qty: 56 CAPSULE | Refills: 1 | Status: SHIPPED | OUTPATIENT
Start: 2020-04-27 | End: 2020-09-24

## 2020-04-27 NOTE — TELEPHONE ENCOUNTER
Pt needs a refill called in for Macrobid to [facility].  He is down to his last pill so it needs to called in right away.

## 2020-05-06 DIAGNOSIS — R35.0 BENIGN PROSTATIC HYPERPLASIA WITH URINARY FREQUENCY: ICD-10-CM

## 2020-05-06 DIAGNOSIS — N40.1 BENIGN PROSTATIC HYPERPLASIA WITH URINARY FREQUENCY: ICD-10-CM

## 2020-05-06 RX ORDER — TAMSULOSIN HYDROCHLORIDE 0.4 MG/1
1 CAPSULE ORAL NIGHTLY
Qty: 90 CAPSULE | Refills: 4 | Status: SHIPPED | OUTPATIENT
Start: 2020-05-06 | End: 2021-04-13 | Stop reason: DRUGHIGH

## 2020-05-14 ENCOUNTER — OFFICE VISIT (OUTPATIENT)
Dept: UROLOGY | Facility: CLINIC | Age: 65
End: 2020-05-14

## 2020-05-14 VITALS — HEIGHT: 71 IN | BODY MASS INDEX: 34.72 KG/M2 | TEMPERATURE: 98.5 F | WEIGHT: 248 LBS

## 2020-05-14 DIAGNOSIS — N39.42 URINARY INCONTINENCE WITHOUT SENSORY AWARENESS: Primary | ICD-10-CM

## 2020-05-14 DIAGNOSIS — N39.0 URINARY TRACT INFECTION WITHOUT HEMATURIA, SITE UNSPECIFIED: ICD-10-CM

## 2020-05-14 DIAGNOSIS — R35.0 FREQUENCY OF MICTURITION: ICD-10-CM

## 2020-05-14 DIAGNOSIS — N42.9 PROSTATE DISEASE: ICD-10-CM

## 2020-05-14 PROBLEM — N37 URETHRAL STRICTURE DUE TO INFECTION: Status: ACTIVE | Noted: 2020-05-14

## 2020-05-14 LAB
BILIRUB BLD-MCNC: NEGATIVE MG/DL
CLARITY, POC: ABNORMAL
COLOR UR: YELLOW
GLUCOSE UR STRIP-MCNC: NEGATIVE MG/DL
KETONES UR QL: NEGATIVE
LEUKOCYTE EST, POC: ABNORMAL
NITRITE UR-MCNC: NEGATIVE MG/ML
PH UR: 6.5 [PH] (ref 5–8)
PROT UR STRIP-MCNC: NEGATIVE MG/DL
RBC # UR STRIP: ABNORMAL /UL
SP GR UR: 1.01 (ref 1–1.03)
UROBILINOGEN UR QL: NORMAL

## 2020-05-14 PROCEDURE — 99214 OFFICE O/P EST MOD 30 MIN: CPT | Performed by: NURSE PRACTITIONER

## 2020-05-14 PROCEDURE — 81003 URINALYSIS AUTO W/O SCOPE: CPT | Performed by: NURSE PRACTITIONER

## 2020-05-14 RX ORDER — OXYBUTYNIN CHLORIDE 5 MG/1
5 TABLET ORAL 2 TIMES DAILY
Qty: 60 TABLET | Refills: 2 | Status: SHIPPED | OUTPATIENT
Start: 2020-05-14 | End: 2020-08-31

## 2020-05-14 RX ORDER — GENTAMICIN SULFATE 80 MG/100ML
80 INJECTION, SOLUTION INTRAVENOUS ONCE
Status: CANCELLED | OUTPATIENT
Start: 2020-06-01 | End: 2020-05-14

## 2020-05-14 NOTE — PROGRESS NOTES
Chief Complaint:          Chief Complaint   Patient presents with   • Urinary Tract Infection     follow up        HPI:   65 y.o. male.  Patient presents to clinic today for evaluation. He appears very tired, fatigued and pale. He reports worsening urinary symptoms that are bothersome to him.     He now has frequent episodes of urinary incontinence and reports wearing 6-8 briefs daily. He reports urinary frequency, burning on urination, pelvic pressure and discomfort.  He has urgency, he states hiis gross hematuria has resolved. He denies fevers or chills, he denies nausea, vomiting, and diarrhea.  He does not have CVA tenderness.    He continues antibiotic prophylaxis with Macrobid.  He is on maximum therapy for his prostate with Flomax and finasteride daily, which he reports taking diligently.  His IPSS score today is 18,  his urinalysis  Show 3 + positive for leukocytes, negative nitrites and 1+ microscopic hematuria.       We will send his urine out for culture.    Past Medical History:        Past Medical History:   Diagnosis Date   • Arthritis    • Bladder stones    • BPH (benign prostatic hyperplasia)    • Cancer (CMS/Formerly [facility])     skin ca- felix   • Claustrophobia    • COPD (chronic obstructive pulmonary disease) (CMS/Formerly [facility])    • Coronary artery disease    • Diabetes (CMS/HCC)    • Difficulty urinating    • Fracture, finger    • Hematuria    • High cholesterol    • History of sepsis    • Hypertension    • MI (myocardial infarction) (CMS/Formerly [facility])    • Prostate disease    • Stroke (CMS/Formerly [facility])     X3 last one 9/2016   TIA   • TIA (transient ischemic attack)    • UTI (urinary tract infection)          The following portions of the patient's history were reviewed and updated as appropriate: allergies, current medications, past family history, past medical history, past social history, past surgical history and problem list.    Current Meds:     Current Outpatient Medications   Medication Sig Dispense Refill   • aspirin   MG tablet Take 325 mg by mouth Every 6 (Six) Hours As Needed.     • Blood Glucose Monitoring Suppl (ACCU-CHEK JOSE G) device Use as directed to test blood sugar three times daily and and bedtime. 1 each 0   • Clopidogrel Bisulfate (PLAVIX PO) Take 75 mg by mouth Daily.     • diclofenac (VOLTAREN) 75 MG EC tablet Take 1 tablet by mouth 2 (Two) Times a Day. 30 tablet 0   • doxazosin (CARDURA) 8 MG tablet Take 8 mg by mouth Every Morning.     • finasteride (PROSCAR) 5 MG tablet Take 1 tablet by mouth Daily. 30 tablet 5   • furosemide (LASIX) 20 MG tablet As Needed.     • gabapentin (NEURONTIN) 600 MG tablet      • glimepiride (AMARYL) 4 MG tablet Take 4 mg by mouth Every Morning Before Breakfast.     • glucose blood test strip Use as directed to test blood sugar three times daily and and bedtime. 120 each 0   • HYDROcodone-acetaminophen (NORCO) 5-325 MG per tablet Take 1 tablet by mouth Every 6 (Six) Hours As Needed for Moderate Pain . 12 tablet 0   • insulin aspart (novoLOG FLEXPEN) 100 UNIT/ML solution pen-injector sc pen Use as directed 4 times daily per sliding scale (0 to 7 units/dose). (Patient taking differently: 2 (Two) Times a Day.) 15 mL 0   • insulin detemir (LEVEMIR) 100 UNIT/ML injection Inject 15 Units under the skin Daily. 15 mL 0   • Insulin Pen Needle 29G X 12.7MM misc Use as directed with insulin three times daily with meals and at bedtime. 120 each 0   • metFORMIN (GLUCOPHAGE) 500 MG tablet      • nitrofurantoin, macrocrystal-monohydrate, (Macrobid) 100 MG capsule Take 1 capsule by mouth 2 (Two) Times a Day. 56 capsule 1   • pantoprazole (PROTONIX) 40 MG EC tablet Take 40 mg by mouth daily.     • phenazopyridine (PYRIDIUM) 200 MG tablet Take 1 tablet by mouth 3 (Three) Times a Day As Needed for Bladder Spasms. 20 tablet 0   • polyethylene glycol (MIRALAX) pack packet Mix 17 g with liquid of choice and drink   Daily. 500 g 0   • tamsulosin (FLOMAX) 0.4 MG capsule 24 hr capsule Take 1 capsule by mouth  Every Night. 90 capsule 4   • traMADol (ULTRAM) 50 MG tablet      • TRUEPLUS LANCETS 33G misc Use as directed to test blood sugar three times daily and and bedtime. 120 each 0   • zolpidem (AMBIEN) 10 MG tablet Take 10 mg by mouth at night as needed.     • oxybutynin (DITROPAN) 5 MG tablet Take 1 tablet by mouth 2 (Two) Times a Day. 60 tablet 2     No current facility-administered medications for this visit.         Allergies:      No Known Allergies     Past Surgical History:     Past Surgical History:   Procedure Laterality Date   • CARDIAC CATHETERIZATION  03/03/2014   • CARDIAC SURGERY     • CARDIOVASCULAR STRESS TEST  04/04/2013   • CATARACT EXTRACTION     • CEREBRAL ANEURYSM REPAIR     • COLONOSCOPY     • CORONARY ANGIOPLASTY WITH STENT PLACEMENT     • CYSTOSCOPY LITHOLAPAXY BLADDER STONE EXTRACTION N/A 11/29/2017    Procedure: CYSTOSCOPY LITHOLAPAXY BLADDER STONE EXTRACTION;  Surgeon: Flavio Barrios MD;  Location: [facility];  Service:    • ECHO - CONVERTED  02/02/2014   • EYE SURGERY     • INNER EAR SURGERY     • SKIN BIOPSY     • TONSILLECTOMY           Social History:     Social History     Socioeconomic History   • Marital status: Single     Spouse name: Not on file   • Number of children: Not on file   • Years of education: Not on file   • Highest education level: Not on file   Tobacco Use   • Smoking status: Current Every Day Smoker     Packs/day: 1.00     Years: 45.00     Pack years: 45.00   • Smokeless tobacco: Never Used   Substance and Sexual Activity   • Alcohol use: No   • Drug use: No   • Sexual activity: Defer     Birth control/protection: None       Family History:     Family History   Problem Relation Age of Onset   • Osteoporosis Mother    • Cancer Mother         breast and lung   • Cancer Father         small cell cancer, kidney   • Heart disease Father    • Diabetes Father    • No Known Problems Sister    • Aneurysm Brother        Review of Systems:     Review of Systems    Constitutional: Negative for activity change, appetite change, chills, fatigue and fever.   HENT: Negative for congestion and sinus pressure.    Eyes: Negative for blurred vision and double vision.   Respiratory: Negative for shortness of breath and wheezing.    Gastrointestinal: Positive for nausea. Negative for abdominal pain, constipation, diarrhea and vomiting.   Genitourinary: Positive for flank pain, frequency, hematuria, urgency and urinary incontinence. Negative for difficulty urinating, discharge, dysuria, genital sores, penile pain, penile swelling, scrotal swelling and testicular pain.   Musculoskeletal: Positive for back pain.   Neurological: Negative for dizziness, weakness and confusion.   Psychiatric/Behavioral: Positive for stress. Negative for agitation, behavioral problems and decreased concentration. The patient is nervous/anxious.       IPSS Questionnaire (AUA-7):  Over the past month…    1)  How often have you had a sensation of not emptying your bladder completely after you finish urinating?  3 - About half the time   2)  How often have you had to urinate again less than two hours after you finished urinating? 3 - About half the time   3)  How often have you found you stopped and started again several times when you urinated?  2 - Less than half the time   4) How difficult have you found it to postpone urination?  4 - More than half the time   5) How often have you had a weak urinary stream?  1 - Less than 1 time in 5   6) How often have you had to push or strain to begin urination?  2 - Less than half the time   7) How many times did you most typically get up to urinate from the time you went to bed until the time you got up in the morning?  3 - 3 times   Total score:  0-7 mildly symptomatic    8-19 moderately symptomatic                                                  18    20-35 severely symptomatic       Physical Exam:     Physical Exam   Constitutional: He is oriented to person, place,  and time. He appears well-developed and well-nourished. He appears distressed.   HENT:   Head: Normocephalic and atraumatic.   Right Ear: External ear normal.   Left Ear: External ear normal.   Eyes: Pupils are equal, round, and reactive to light. Conjunctivae and EOM are normal. Right eye exhibits no discharge. Left eye exhibits no discharge.   Neck: Normal range of motion. Neck supple. No tracheal deviation present. No thyromegaly present.   Cardiovascular: Normal rate and regular rhythm. Exam reveals no friction rub.   No murmur heard.  Pulmonary/Chest: Effort normal and breath sounds normal. No stridor. No respiratory distress.   Abdominal: Soft. Bowel sounds are normal. He exhibits no distension. There is tenderness. There is no guarding.   Genitourinary: Rectum normal, testes normal and penis normal. Rectal exam shows guaiac negative stool. Uncircumcised. No penile tenderness. No discharge found.   Musculoskeletal: Normal range of motion. He exhibits tenderness. He exhibits no edema or deformity.   Neurological: He is alert and oriented to person, place, and time. No cranial nerve deficit. Coordination normal.   Skin: Skin is warm and dry. Capillary refill takes less than 2 seconds. No pallor.   Psychiatric: He has a normal mood and affect. His behavior is normal. Judgment and thought content normal.       Procedure:       Assessment/Plan:     Recurrent UTIs, / BPH with urinary Retention/Urinary Incontinence: Patients last CT scan showed marked prostate enlargement, with urinary bladder wall thickening.This is consistent with his chronic partial bladder outlet obstruction.    We also discussed the mild prominence of his right ureter near the level of his urinary bladder insertion which is also probably related to his chronic kidney urinary retention issues and the chronic partial bladder outlet obstruction leading to his recurrent UTI's.    Patient is desirous of intervention at this point. Discussed different  options with patient, Such as Anethetic Cystoscopy with Dr. Barrios since he is too scared     Patient has been scheduled for procedure on  06/01/2020      Will Start oxybutunin 5 mg mg po BID,    Will send his urine off for culture, I will call him with culture results.     He is to continue his prophylaxis with Macrobid, Flomax, and finasteride as prescribed.      Patient's Body mass index is 34.59 kg/m². BMI is above normal parameters. Recommendations include: educational material, exercise counseling and nutrition counseling.    Smoking Cessation Counseling:  Current some day smoker. less than 3 minutes spent counseling. Has reduced tobacco use.  I advised patient to quit tobacco use and offered support.  I provided patient with tobacco cessation educational material printed in the patient's After Visit Summary.       Counseling was given to patient for the following topics diagnostic results including: BPH, Urine Incontinence, Recurrent UTIs, instructions for management as follows: bathroom schedule every 2-3 hours, monitor bedtime fluid intake, contine flomax, finasteride, macrobid, etc, risk factor reductions including: smoking cessation, avoiding bladder irritants such as caffiene products, coffee, soda drinks,  and impressions as follows: Anesthetic cystocospy 06/01/20 with Dr. Barrios.. The interim medical history and current results were reviewed.  A treatment plan with follow-up was made for Urinary incontinence without sensory awareness [N39.42]            This document has been electronically signed by Griselda Cheng-Akwa, APRN May 15, 2020 08:40

## 2020-05-15 PROBLEM — N39.42 URINARY INCONTINENCE WITHOUT SENSORY AWARENESS: Status: ACTIVE | Noted: 2020-05-15

## 2020-05-15 PROBLEM — N39.0 URINARY TRACT INFECTION WITHOUT HEMATURIA: Status: ACTIVE | Noted: 2020-05-15

## 2020-05-15 NOTE — PATIENT INSTRUCTIONS
Urinary Incontinence    Urinary incontinence refers to a condition in which a person is unable to control where and when to pass urine. A person with this condition will urinate when he or she does not mean to (involuntarily).  What are the causes?  This condition may be caused by:  · Medicines.  · Infections.  · Constipation.  · Overactive bladder muscles.  · Weak bladder muscles.  · Weak pelvic floor muscles. These muscles provide support for the bladder, intestine, and, in women, the uterus.  · Enlarged prostate in men. The prostate is a gland near the bladder. When it gets too big, it can pinch the urethra. With the urethra blocked, the bladder can weaken and lose the ability to empty properly.  · Surgery.  · Emotional factors, such as anxiety, stress, or post-traumatic stress disorder (PTSD).  · Pelvic organ prolapse. This happens in women when organs shift out of place and into the vagina. This shift can prevent the bladder and urethra from working properly.  What increases the risk?  The following factors may make you more likely to develop this condition:  · Older age.  · Obesity and physical inactivity.  · Pregnancy and childbirth.  · Menopause.  · Diseases that affect the nerves or spinal cord (neurological diseases).  · Long-term (chronic) coughing. This can increase pressure on the bladder and pelvic floor muscles.  What are the signs or symptoms?  Symptoms may vary depending on the type of urinary incontinence you have. They include:  · A sudden urge to urinate, but passing urine involuntarily before you can get to a bathroom (urge incontinence).  · Suddenly passing urine with any activity that forces urine to pass, such as coughing, laughing, exercise, or sneezing (stress incontinence).  · Needing to urinate often, but urinating only a small amount, or constantly dribbling urine (overflow incontinence).  · Urinating because you cannot get to the bathroom in time due to a physical disability, such as  arthritis or injury, or communication and thinking problems, such as Alzheimer disease (functional incontinence).  How is this diagnosed?  This condition may be diagnosed based on:  · Your medical history.  · A physical exam.  · Tests, such as:  ? Urine tests.  ? X-rays of your kidney and bladder.  ? Ultrasound.  ? CT scan.  ? Cystoscopy. In this procedure, a health care provider inserts a tube with a light and camera (cystoscope) through the urethra and into the bladder in order to check for problems.  ? Urodynamic testing. These tests assess how well the bladder, urethra, and sphincter can store and release urine. There are different types of urodynamic tests, and they vary depending on what the test is measuring.  To help diagnose your condition, your health care provider may recommend that you keep a log of when you urinate and how much you urinate.  How is this treated?  Treatment for this condition depends on the type of incontinence that you have and its cause. Treatment may include:  · Lifestyle changes, such as:  ? Quitting smoking.  ? Maintaining a healthy weight.  ? Staying active. Try to get 150 minutes of moderate-intensity exercise every week. Ask your health care provider which activities are safe for you.  ? Eating a healthy diet.  § Avoid high-fat foods, like fried foods.  § Avoid refined carbohydrates like white bread and white rice.  § Limit how much alcohol and caffeine you drink.  § Increase your fiber intake. Foods such as fresh fruits, vegetables, beans, and whole grains are healthy sources of fiber.  · Pelvic floor muscle exercises.  · Bladder training, such as lengthening the amount of time between bathroom breaks, or using the bathroom at regular intervals.  · Using techniques to suppress bladder urges. This can include distraction techniques or controlled breathing exercises.  · Medicines to relax the bladder muscles and prevent bladder spasms.  · Medicines to help slow or prevent the  growth of a man's prostate.  · Botox injections. These can help relax the bladder muscles.  · Using pulses of electricity to help change bladder reflexes (electrical nerve stimulation).  · For women, using a medical device to prevent urine leaks. This is a small, tampon-like, disposable device that is inserted into the urethra.  · Injecting collagen or carbon beads (bulking agents) into the urinary sphincter. These can help thicken tissue and close the bladder opening.  · Surgery.  Follow these instructions at home:  Lifestyle  · Limit alcohol and caffeine. These can fill your bladder quickly and irritate it.  · Keep yourself clean to help prevent odors and skin damage. Ask your doctor about special skin creams and cleansers that can protect the skin from urine.  · Consider wearing pads or adult diapers. Make sure to change them regularly, and always change them right after experiencing incontinence.  General instructions  · Take over-the-counter and prescription medicines only as told by your health care provider.  · Use the bathroom about every 3-4 hours, even if you do not feel the need to urinate. Try to empty your bladder completely every time. After urinating, wait a minute. Then try to urinate again.  · Make sure you are in a relaxed position while urinating.  · If your incontinence is caused by nerve problems, keep a log of the medicines you take and the times you go to the bathroom.  · Keep all follow-up visits as told by your health care provider. This is important.  Contact a health care provider if:  · You have pain that gets worse.  · Your incontinence gets worse.  Get help right away if:  · You have a fever or chills.  · You are unable to urinate.  · You have redness in your groin area or down your legs.  Summary  · Urinary incontinence refers to a condition in which a person is unable to control where and when to pass urine.  · This condition may be caused by medicines, infection, weak bladder  muscles, weak pelvic floor muscles, enlargement of the prostate (in men), or surgery.  · The following factors increase your risk for developing this condition: older age, obesity, pregnancy and childbirth, menopause, neurological diseases, and chronic coughing.  · There are several types of urinary incontinence. They include urge incontinence, stress incontinence, overflow incontinence, and functional incontinence.  · This condition is usually treated first with lifestyle and behavioral changes, such as quitting smoking, eating a healthier diet, and doing regular pelvic floor exercises. Other treatment options include medicines, bulking agents, medical devices, electrical nerve stimulation, or surgery.  This information is not intended to replace advice given to you by your health care provider. Make sure you discuss any questions you have with your health care provider.  Document Released: 01/25/2006 Document Revised: 03/29/2018 Document Reviewed: 03/29/2018  [employer] Patient Education © 2020 [employer]

## 2020-05-16 LAB
BACTERIA UR CULT: NO GROWTH
BACTERIA UR CULT: NORMAL

## 2020-05-28 ENCOUNTER — TRANSCRIBE ORDERS (OUTPATIENT)
Dept: ADMINISTRATIVE | Facility: HOSPITAL | Age: 65
End: 2020-05-28

## 2020-05-28 DIAGNOSIS — Z01.818 PRE-OPERATIVE CLEARANCE: Primary | ICD-10-CM

## 2020-05-29 ENCOUNTER — APPOINTMENT (OUTPATIENT)
Dept: PREADMISSION TESTING | Facility: HOSPITAL | Age: 65
End: 2020-05-29

## 2020-05-29 ENCOUNTER — LAB (OUTPATIENT)
Dept: LAB | Facility: HOSPITAL | Age: 65
End: 2020-05-29

## 2020-05-29 DIAGNOSIS — Z01.818 PRE-OPERATIVE CLEARANCE: ICD-10-CM

## 2020-05-29 LAB
ANION GAP SERPL CALCULATED.3IONS-SCNC: 13 MMOL/L (ref 5–15)
BUN BLD-MCNC: 6 MG/DL (ref 8–23)
BUN/CREAT SERPL: 5.1 (ref 7–25)
CALCIUM SPEC-SCNC: 8.8 MG/DL (ref 8.6–10.5)
CHLORIDE SERPL-SCNC: 99 MMOL/L (ref 98–107)
CO2 SERPL-SCNC: 22 MMOL/L (ref 22–29)
CREAT BLD-MCNC: 1.17 MG/DL (ref 0.76–1.27)
DEPRECATED RDW RBC AUTO: 51.8 FL (ref 37–54)
ERYTHROCYTE [DISTWIDTH] IN BLOOD BY AUTOMATED COUNT: 18.8 % (ref 12.3–15.4)
GFR SERPL CREATININE-BSD FRML MDRD: 63 ML/MIN/1.73
GLUCOSE BLD-MCNC: 122 MG/DL (ref 65–99)
HCT VFR BLD AUTO: 29.1 % (ref 37.5–51)
HGB BLD-MCNC: 8.8 G/DL (ref 13–17.7)
MCH RBC QN AUTO: 23.3 PG (ref 26.6–33)
MCHC RBC AUTO-ENTMCNC: 30.2 G/DL (ref 31.5–35.7)
MCV RBC AUTO: 77 FL (ref 79–97)
PLATELET # BLD AUTO: 227 10*3/MM3 (ref 140–450)
PMV BLD AUTO: 11.1 FL (ref 6–12)
POTASSIUM BLD-SCNC: 4.6 MMOL/L (ref 3.5–5.2)
RBC # BLD AUTO: 3.78 10*6/MM3 (ref 4.14–5.8)
SODIUM BLD-SCNC: 134 MMOL/L (ref 136–145)
WBC NRBC COR # BLD: 6.29 10*3/MM3 (ref 3.4–10.8)

## 2020-05-29 PROCEDURE — U0004 COV-19 TEST NON-CDC HGH THRU: HCPCS

## 2020-05-29 PROCEDURE — 80048 BASIC METABOLIC PNL TOTAL CA: CPT | Performed by: ANESTHESIOLOGY

## 2020-05-29 PROCEDURE — U0002 COVID-19 LAB TEST NON-CDC: HCPCS

## 2020-05-29 PROCEDURE — 36415 COLL VENOUS BLD VENIPUNCTURE: CPT

## 2020-05-29 PROCEDURE — C9803 HOPD COVID-19 SPEC COLLECT: HCPCS

## 2020-05-29 PROCEDURE — 85027 COMPLETE CBC AUTOMATED: CPT | Performed by: ANESTHESIOLOGY

## 2020-05-30 LAB
REF LAB TEST METHOD: NORMAL
SARS-COV-2 RNA RESP QL NAA+PROBE: NOT DETECTED

## 2020-06-01 ENCOUNTER — HOSPITAL ENCOUNTER (OUTPATIENT)
Facility: HOSPITAL | Age: 65
Discharge: HOME | End: 2020-06-01
Attending: UROLOGY | Admitting: UROLOGY

## 2020-06-01 ENCOUNTER — APPOINTMENT (OUTPATIENT)
Dept: GENERAL RADIOLOGY | Facility: HOSPITAL | Age: 65
End: 2020-06-01

## 2020-06-01 ENCOUNTER — ANESTHESIA EVENT (OUTPATIENT)
Dept: PERIOP | Facility: HOSPITAL | Age: 65
End: 2020-06-01

## 2020-06-01 ENCOUNTER — ANESTHESIA (OUTPATIENT)
Dept: PERIOP | Facility: HOSPITAL | Age: 65
End: 2020-06-01

## 2020-06-01 VITALS
WEIGHT: 315 LBS | SYSTOLIC BLOOD PRESSURE: 152 MMHG | HEART RATE: 81 BPM | OXYGEN SATURATION: 99 % | DIASTOLIC BLOOD PRESSURE: 87 MMHG | BODY MASS INDEX: 42.66 KG/M2 | TEMPERATURE: 97.9 F | RESPIRATION RATE: 18 BRPM | HEIGHT: 72 IN

## 2020-06-01 DIAGNOSIS — N42.9 PROSTATE DISEASE: ICD-10-CM

## 2020-06-01 DIAGNOSIS — N30.20 CHRONIC CYSTITIS: Primary | ICD-10-CM

## 2020-06-01 LAB — GLUCOSE BLDC GLUCOMTR-MCNC: 181 MG/DL (ref 70–130)

## 2020-06-01 PROCEDURE — 63710000001 OPIUM-BELLADONNA 16.2-30 MG SUPPOSITORY

## 2020-06-01 PROCEDURE — 52204 CYSTOSCOPY W/BIOPSY(S): CPT | Performed by: UROLOGY

## 2020-06-01 PROCEDURE — 25010000002 FUROSEMIDE PER 20 MG: Performed by: UROLOGY

## 2020-06-01 PROCEDURE — 63710000001 OPIUM-BELLADONNA 16.2-30 MG SUPPOSITORY: Performed by: UROLOGY

## 2020-06-01 PROCEDURE — 25010000002 FENTANYL CITRATE (PF) 100 MCG/2ML SOLUTION: Performed by: NURSE ANESTHETIST, CERTIFIED REGISTERED

## 2020-06-01 PROCEDURE — 25010000002 MIDAZOLAM PER 1 MG: Performed by: NURSE ANESTHETIST, CERTIFIED REGISTERED

## 2020-06-01 PROCEDURE — 25010000002 KETOROLAC TROMETHAMINE PER 15 MG: Performed by: NURSE ANESTHETIST, CERTIFIED REGISTERED

## 2020-06-01 PROCEDURE — A9270 NON-COVERED ITEM OR SERVICE: HCPCS

## 2020-06-01 PROCEDURE — 25010000002 PROPOFOL 10 MG/ML EMULSION: Performed by: NURSE ANESTHETIST, CERTIFIED REGISTERED

## 2020-06-01 PROCEDURE — 25010000002 ONDANSETRON PER 1 MG: Performed by: NURSE ANESTHETIST, CERTIFIED REGISTERED

## 2020-06-01 PROCEDURE — 82962 GLUCOSE BLOOD TEST: CPT

## 2020-06-01 PROCEDURE — 25010000002 GENTAMICIN PER 80 MG: Performed by: UROLOGY

## 2020-06-01 PROCEDURE — A9270 NON-COVERED ITEM OR SERVICE: HCPCS | Performed by: UROLOGY

## 2020-06-01 RX ORDER — IPRATROPIUM BROMIDE AND ALBUTEROL SULFATE 2.5; .5 MG/3ML; MG/3ML
3 SOLUTION RESPIRATORY (INHALATION) ONCE AS NEEDED
Status: DISCONTINUED | OUTPATIENT
Start: 2020-06-01 | End: 2020-06-01 | Stop reason: HOSPADM

## 2020-06-01 RX ORDER — MEPERIDINE HYDROCHLORIDE 25 MG/ML
12.5 INJECTION INTRAMUSCULAR; INTRAVENOUS; SUBCUTANEOUS
Status: DISCONTINUED | OUTPATIENT
Start: 2020-06-01 | End: 2020-06-01 | Stop reason: HOSPADM

## 2020-06-01 RX ORDER — GENTAMICIN SULFATE 80 MG/100ML
80 INJECTION, SOLUTION INTRAVENOUS ONCE
Status: COMPLETED | OUTPATIENT
Start: 2020-06-01 | End: 2020-06-01

## 2020-06-01 RX ORDER — OXYCODONE HYDROCHLORIDE AND ACETAMINOPHEN 5; 325 MG/1; MG/1
1 TABLET ORAL ONCE AS NEEDED
Status: DISCONTINUED | OUTPATIENT
Start: 2020-06-01 | End: 2020-06-01 | Stop reason: HOSPADM

## 2020-06-01 RX ORDER — MIDAZOLAM HYDROCHLORIDE 1 MG/ML
INJECTION INTRAMUSCULAR; INTRAVENOUS AS NEEDED
Status: DISCONTINUED | OUTPATIENT
Start: 2020-06-01 | End: 2020-06-01 | Stop reason: SURG

## 2020-06-01 RX ORDER — FAMOTIDINE 10 MG/ML
INJECTION, SOLUTION INTRAVENOUS AS NEEDED
Status: DISCONTINUED | OUTPATIENT
Start: 2020-06-01 | End: 2020-06-01 | Stop reason: SURG

## 2020-06-01 RX ORDER — FUROSEMIDE 10 MG/ML
20 INJECTION INTRAMUSCULAR; INTRAVENOUS ONCE
Status: COMPLETED | OUTPATIENT
Start: 2020-06-01 | End: 2020-06-01

## 2020-06-01 RX ORDER — KETOROLAC TROMETHAMINE 30 MG/ML
INJECTION, SOLUTION INTRAMUSCULAR; INTRAVENOUS AS NEEDED
Status: DISCONTINUED | OUTPATIENT
Start: 2020-06-01 | End: 2020-06-01 | Stop reason: SURG

## 2020-06-01 RX ORDER — SODIUM CHLORIDE 0.9 % (FLUSH) 0.9 %
10 SYRINGE (ML) INJECTION EVERY 12 HOURS SCHEDULED
Status: DISCONTINUED | OUTPATIENT
Start: 2020-06-01 | End: 2020-06-01 | Stop reason: HOSPADM

## 2020-06-01 RX ORDER — FENTANYL CITRATE 50 UG/ML
50 INJECTION, SOLUTION INTRAMUSCULAR; INTRAVENOUS
Status: DISCONTINUED | OUTPATIENT
Start: 2020-06-01 | End: 2020-06-01 | Stop reason: HOSPADM

## 2020-06-01 RX ORDER — FENTANYL CITRATE 50 UG/ML
INJECTION, SOLUTION INTRAMUSCULAR; INTRAVENOUS AS NEEDED
Status: DISCONTINUED | OUTPATIENT
Start: 2020-06-01 | End: 2020-06-01 | Stop reason: SURG

## 2020-06-01 RX ORDER — ONDANSETRON 2 MG/ML
4 INJECTION INTRAMUSCULAR; INTRAVENOUS AS NEEDED
Status: DISCONTINUED | OUTPATIENT
Start: 2020-06-01 | End: 2020-06-01 | Stop reason: HOSPADM

## 2020-06-01 RX ORDER — PROPOFOL 10 MG/ML
VIAL (ML) INTRAVENOUS CONTINUOUS PRN
Status: DISCONTINUED | OUTPATIENT
Start: 2020-06-01 | End: 2020-06-01 | Stop reason: SURG

## 2020-06-01 RX ORDER — SODIUM CHLORIDE 0.9 % (FLUSH) 0.9 %
10 SYRINGE (ML) INJECTION AS NEEDED
Status: DISCONTINUED | OUTPATIENT
Start: 2020-06-01 | End: 2020-06-01 | Stop reason: HOSPADM

## 2020-06-01 RX ORDER — ONDANSETRON 2 MG/ML
INJECTION INTRAMUSCULAR; INTRAVENOUS AS NEEDED
Status: DISCONTINUED | OUTPATIENT
Start: 2020-06-01 | End: 2020-06-01 | Stop reason: SURG

## 2020-06-01 RX ORDER — ATROPA BELLADONNA AND OPIUM 16.2; 3 MG/1; MG/1
SUPPOSITORY RECTAL AS NEEDED
Status: DISCONTINUED | OUTPATIENT
Start: 2020-06-01 | End: 2020-06-01 | Stop reason: HOSPADM

## 2020-06-01 RX ORDER — SODIUM CHLORIDE, SODIUM LACTATE, POTASSIUM CHLORIDE, CALCIUM CHLORIDE 600; 310; 30; 20 MG/100ML; MG/100ML; MG/100ML; MG/100ML
125 INJECTION, SOLUTION INTRAVENOUS CONTINUOUS
Status: DISCONTINUED | OUTPATIENT
Start: 2020-06-01 | End: 2020-06-01 | Stop reason: HOSPADM

## 2020-06-01 RX ORDER — MAGNESIUM HYDROXIDE 1200 MG/15ML
LIQUID ORAL AS NEEDED
Status: DISCONTINUED | OUTPATIENT
Start: 2020-06-01 | End: 2020-06-01 | Stop reason: HOSPADM

## 2020-06-01 RX ORDER — OXYCODONE AND ACETAMINOPHEN 10; 325 MG/1; MG/1
1 TABLET ORAL EVERY 4 HOURS PRN
Qty: 12 TABLET | Refills: 0 | Status: SHIPPED | OUTPATIENT
Start: 2020-06-01 | End: 2021-03-11

## 2020-06-01 RX ADMIN — ONDANSETRON 4 MG: 2 INJECTION INTRAMUSCULAR; INTRAVENOUS at 08:37

## 2020-06-01 RX ADMIN — FENTANYL CITRATE 100 MCG: 50 INJECTION INTRAMUSCULAR; INTRAVENOUS at 08:37

## 2020-06-01 RX ADMIN — KETOROLAC TROMETHAMINE 30 MG: 30 INJECTION, SOLUTION INTRAMUSCULAR; INTRAVENOUS at 08:37

## 2020-06-01 RX ADMIN — FAMOTIDINE 20 MG: 10 INJECTION INTRAVENOUS at 08:37

## 2020-06-01 RX ADMIN — MIDAZOLAM HYDROCHLORIDE 2 MG: 1 INJECTION, SOLUTION INTRAMUSCULAR; INTRAVENOUS at 08:37

## 2020-06-01 RX ADMIN — FUROSEMIDE 20 MG: 10 INJECTION, SOLUTION INTRAMUSCULAR; INTRAVENOUS at 10:03

## 2020-06-01 RX ADMIN — PROPOFOL 100 MCG/KG/MIN: 10 INJECTION, EMULSION INTRAVENOUS at 08:40

## 2020-06-01 RX ADMIN — SODIUM CHLORIDE, POTASSIUM CHLORIDE, SODIUM LACTATE AND CALCIUM CHLORIDE: 600; 310; 30; 20 INJECTION, SOLUTION INTRAVENOUS at 08:37

## 2020-06-01 RX ADMIN — SODIUM CHLORIDE, POTASSIUM CHLORIDE, SODIUM LACTATE AND CALCIUM CHLORIDE 125 ML/HR: 600; 310; 30; 20 INJECTION, SOLUTION INTRAVENOUS at 07:47

## 2020-06-01 RX ADMIN — GENTAMICIN SULFATE 80 MG: 80 INJECTION, SOLUTION INTRAVENOUS at 08:36

## 2020-06-01 NOTE — OP NOTE
URETHRAL DILATATION  Procedure Note    Jovanni Richter  6/1/2020    Pre-op Diagnosis:   Prostate disease [N42.9]    Post-op Diagnosis:     Post-Op Diagnosis Codes:     * Prostate disease [N42.9]    Procedure/CPT® Codes:    65-year-old white male initially presented with a bladder tumor  Severe frequency urgency and incontinence.  Following an informed consent is brought the operative suite.  After unremarkable anesthesia I anesthetized the urethra with 22 cc of 2% viscous Xylocaine jelly the urethra was normal no strictures are identified the prostate was actually not that enlarged I was very impressed how good it looked entering the bladder there was severe erosive cystitis almost entirely throughout the bladder with a classic whitish appearance it was biopsied with a cold cup forceps the area fulgurated and sent for pathologic confirmation this did not appear to be malignant this is severe chronic erosive cystitis which was conundrum by itself.  The remainder the examination was unremarkable.  The urinary bladder was quite contracted.  I went ahead and got good hemostasis placed the catheter to gravity drainage and used a belladonna and opium suppository I will see him back tomorrow  Procedure(s):  CYSTOSCOPY URETHRAL DILATATION, bladder biopsy    Surgeon(s):  Flavio Barrios MD    Anesthesia: see anesthesia record    Staff:   Circulator: Ryan Byers RN  Scrub Person: Mary Knight  Other: Celine Beth    Estimated Blood Loss: none  Urine Voided: * No values recorded between 6/1/2020  8:36 AM and 6/1/2020  9:04 AM *    Specimens:                ID Type Source Tests Collected by Time   A :  Tissue Urinary Bladder TISSUE PATHOLOGY EXAM Flavio Barrios MD 6/1/2020 0904         Drains: [place] catheter    Findings: Severe erosive chronic cystitis    Blood: N/A    Complications: None    Grafts and Implants: None    Flavio Barrios MD     Date: 6/1/2020  Time: 09:17

## 2020-06-01 NOTE — ANESTHESIA PREPROCEDURE EVALUATION
Anesthesia Evaluation     Patient summary reviewed and Nursing notes reviewed   no history of anesthetic complications:  NPO Solid Status: > 8 hours  NPO Liquid Status: > 8 hours           Airway   Mallampati: III  TM distance: <3 FB  Neck ROM: limited  possible difficult intubation  Dental - normal exam   (+) edentulous    Pulmonary - normal exam    breath sounds clear to auscultation  (+) COPD,   Cardiovascular - normal exam  Exercise tolerance: poor (<4 METS)    NYHA Classification: IV  ECG reviewed  PT is on anticoagulation therapy  Rhythm: regular  Rate: normal    (+) hypertension, past MI , CAD, hyperlipidemia,       Neuro/Psych  (+) TIA, CVA, psychiatric history,     GI/Hepatic/Renal/Endo    (+)   diabetes mellitus,     Musculoskeletal     Abdominal   (+) obese,     Bowel sounds: normal.   Substance History - negative use     OB/GYN negative ob/gyn ROS         Other   arthritis, blood dyscrasia anemia,   history of cancer    ROS/Med Hx Other: D/ plavix on 5/28/2020                    Anesthesia Plan    ASA 3     general     intravenous and inhalational induction     Anesthetic plan, all risks, benefits, and alternatives have been provided, discussed and informed consent has been obtained with: patient.    Plan discussed with CRNA.

## 2020-06-01 NOTE — ANESTHESIA POSTPROCEDURE EVALUATION
Patient: Jovanni Richter    Procedure Summary     Date:  06/01/20 Room / Location:  [facility] OR 06 /  [facility]    Anesthesia Start:  0837 Anesthesia Stop:  0904    Procedure:  CYSTOSCOPY URETHRAL DILATATION (N/A Urethra) Diagnosis:       Prostate disease      (Prostate disease [N42.9])    Surgeon:  Flavio Barrios MD Provider:  Trevor Thomas DO    Anesthesia Type:  general ASA Status:  3          Anesthesia Type: general    Vitals  Vitals Value Taken Time   /87 6/1/2020  9:36 AM   Temp 98.2 °F (36.8 °C) 6/1/2020  9:06 AM   Pulse 81 6/1/2020  9:36 AM   Resp 18 6/1/2020  9:36 AM   SpO2 99 % 6/1/2020  9:36 AM           Post Anesthesia Care and Evaluation    Patient location during evaluation: PHASE II  Patient participation: complete - patient participated  Level of consciousness: awake and alert  Pain score: 0  Pain management: adequate  Airway patency: patent  Anesthetic complications: No anesthetic complications  PONV Status: controlled  Cardiovascular status: acceptable  Respiratory status: acceptable and room air  Hydration status: euvolemic  No anesthesia care post op

## 2020-06-02 ENCOUNTER — OFFICE VISIT (OUTPATIENT)
Dept: UROLOGY | Facility: CLINIC | Age: 65
End: 2020-06-02

## 2020-06-02 VITALS — BODY MASS INDEX: 70.92 KG/M2 | HEIGHT: 72 IN

## 2020-06-02 DIAGNOSIS — R30.0 DYSURIA: Primary | ICD-10-CM

## 2020-06-02 PROCEDURE — 96372 THER/PROPH/DIAG INJ SC/IM: CPT | Performed by: UROLOGY

## 2020-06-02 PROCEDURE — 99213 OFFICE O/P EST LOW 20 MIN: CPT | Performed by: UROLOGY

## 2020-06-02 RX ORDER — CLOPIDOGREL BISULFATE 75 MG/1
TABLET ORAL
COMMUNITY
Start: 2020-04-14 | End: 2021-03-11

## 2020-06-02 RX ORDER — GENTAMICIN SULFATE 40 MG/ML
80 INJECTION, SOLUTION INTRAMUSCULAR; INTRAVENOUS ONCE
Status: COMPLETED | OUTPATIENT
Start: 2020-06-02 | End: 2020-06-02

## 2020-06-02 RX ORDER — PIOGLITAZONEHYDROCHLORIDE 15 MG/1
TABLET ORAL
COMMUNITY
Start: 2020-04-29 | End: 2021-03-11

## 2020-06-02 RX ORDER — NITROFURANTOIN 25; 75 MG/1; MG/1
100 CAPSULE ORAL DAILY
Qty: 30 CAPSULE | Refills: 3 | Status: SHIPPED | OUTPATIENT
Start: 2020-06-02 | End: 2020-07-01 | Stop reason: SDUPTHER

## 2020-06-02 RX ORDER — FERROUS SULFATE 325(65) MG
TABLET ORAL
COMMUNITY
Start: 2020-05-26 | End: 2020-10-21 | Stop reason: ALTCHOICE

## 2020-06-02 RX ADMIN — GENTAMICIN SULFATE 80 MG: 40 INJECTION, SOLUTION INTRAMUSCULAR; INTRAVENOUS at 15:14

## 2020-06-02 NOTE — PROGRESS NOTES
Chief Complaint:          Chief Complaint   Patient presents with   • Post op 6/1/20 cath removal       HPI:   65 y.o. male  previously a patient here and saw Ginna in 2014 with BPH placed on doxazosin.  Apparently on Sunday started with significant pain and strangury as well as gross hematuria presents today clot retention having significant pain.  No fevers or chills.  He is in significant pain he is on Plavix for heart disease and stents.  I went ahead and anchored a catheter to gravity drainage and irrigated 5 L of fluid with a large amount of clot and cessation of the blood I told him to see the prostate bleeding or bladder tumor.  He has no flank pain or upper tract symptomatology whatsoever.  I left a 22 [place] catheter gravity drainage its clear him gentamicin and have a CBC and a BMP pending at the time of this dictation.  He had she was admitted the night after the above aforementioned visit.  In fact he was found to have massive BPH and bladder stones as the probable etiology of his symptomatology.  He actually did well after release.  He voided 500 cc after his catheter occluded knee pain now presents for cystoscopy.  Cystoscopy showed a large amount of clot I'm going to set him up for cystoscopy clot evacuation cystoscopy litholapaxy and follow-up based on this on Wednesday morning.  He is maximized with regard to his medical therapy.  He still has poorly controlled diabetes and significant heart disease but he is off the anticoagulant.           He returns today he's doing fantastic he has a great stream he gets up every 2 hours at night most likely secondary to his poorly controlled diabetes he has no pain with urination he has no bleeding he is a solid stream.  I told him that's okay to restart his anticoagulation but he needs to check with his cardiologist.  Overall is done extremely well.  He has no significant prostatic obstruction and I expect him to continue on finasteride and doing extremely  well and check him back in 6 months with a PSA    Interesting gentleman who returns today.  He had been on for blocker and finasteride.  He switched to taking the alpha-blocker only once a week and went off the finasteride completely and now is wetting wearing depends he thought it was from the medicine but I explained actually the medicine was preventing it he has 3+ leukocytes in the urine probably was secondary to irritation a postvoid residual of 89 cc he has no allergies he has urge urge incontinence.  With BPH he has greater than a 50 g prostate to palpation.  I am going to reinitiate therapy and set him up for follow-up.  I explained to him extensively the purpose of these medications as to prevent the problem rather than the other way around.  He returns today he got off his medication of his own volition and had a significant increase in his frequency there is a question of bladder stones.  Today he presents for cystoscopy basically I saw the enlarged prostate.  No stones but a snowstorm of debris and certainly is well on his way to developing stones but he is doing symptomatically order back on his appropriate medication and will discuss to keep him on it  He went to emergency room with frequency and urgency.  Basically was found to have a questionable urinary tract infection.  His urine culture was negative however.  Going to treat him for prostatitis and follow back up with him based on this.  They placed him on cefdinir 300 mg twice a day x7 days.  He had a CT of his chest here his urine today was negative it was positive I will await the culture here and I will follow-up with him based on this  He returns today.  His last culture was negative today he has a right black eye.  He still has the frequency.  His culture from 1223 was no growth final.  He has 2+ leukocytes which I cultured I am to set him up for cystoscopy I suspect were dealing with this his bladder stone has recurred he has been very  noncompliant about his medications  He returns today is postop.  He originally had bladder stones and did quite well but was very noncompliant with his meds he is noncompliant with his diabetes.  He basically has incontinence.  And chronic infections he had a cystoscopy and urethral dilation the cystoscopy revealed what appears to be an alkaline thick cystitis which was biopsied and I am awaiting Confirmation I would leave his catheter in as it is much more comfortable form he is not eating he looks terrible he states he has fevers at home but he was afebrile here in the office.  He also said he had some left tingling in his hand and I told him he should go to the emergency room to get admitted.  I gave him 1 dose of gentamicin today.  I would have him maintain himself on the Macrobid.  I will see him back in 1 week.  I am awaiting the results of the bladder biopsy unfortunately, I suspect this is a bad Uhlman for his bladder and if it is truly what I think it is there is not can be a great deal of due until he gets his sugar under control      Past Medical History:        Past Medical History:   Diagnosis Date   • Arthritis    • Bladder stones    • BPH (benign prostatic hyperplasia)    • Brain aneurysm     1987   • Brain aneurysm 1987   • Cancer (CMS/[facility])     skin ca- felix   • Claustrophobia    • COPD (chronic obstructive pulmonary disease) (CMS/[facility])    • Coronary artery disease    • Diabetes (CMS/[facility])    • Difficulty urinating    • Fracture, finger    • GERD (gastroesophageal reflux disease)    • Hematuria    • High cholesterol    • History of sepsis    • Hypertension    • MI (myocardial infarction) (CMS/[facility])     2013   • Neuropathy     FEET AND LEGS   • Prostate disease    • Stroke (CMS/[facility])     X3 last one 9/2016   TIA   • TIA (transient ischemic attack)    • Unsteady gait    • UTI (urinary tract infection)          Current Meds:     Current Outpatient Medications   Medication Sig Dispense Refill   • aspirin EC  325 MG tablet Take 325 mg by mouth Every 6 (Six) Hours As Needed.     • Blood Glucose Monitoring Suppl (ACCU-CHEK JOSE G) device Use as directed to test blood sugar three times daily and and bedtime. 1 each 0   • clopidogrel (PLAVIX) 75 MG tablet      • diclofenac (VOLTAREN) 75 MG EC tablet Take 1 tablet by mouth 2 (Two) Times a Day. 30 tablet 0   • doxazosin (CARDURA) 8 MG tablet Take 8 mg by mouth Every Morning.     • ferrous sulfate 325 (65 FE) MG tablet      • finasteride (PROSCAR) 5 MG tablet Take 1 tablet by mouth Daily. 30 tablet 5   • furosemide (LASIX) 20 MG tablet Take 20 mg by mouth Daily As Needed.     • gabapentin (NEURONTIN) 600 MG tablet 600 mg 3 (Three) Times a Day.     • glimepiride (AMARYL) 4 MG tablet Take 4 mg by mouth Every Morning Before Breakfast.     • glucose blood test strip Use as directed to test blood sugar three times daily and and bedtime. 120 each 0   • insulin aspart (novoLOG FLEXPEN) 100 UNIT/ML solution pen-injector sc pen Use as directed 4 times daily per sliding scale (0 to 7 units/dose). (Patient taking differently: 2 (Two) Times a Day.) 15 mL 0   • insulin detemir (LEVEMIR) 100 UNIT/ML injection Inject 15 Units under the skin Daily. 15 mL 0   • Insulin Pen Needle 29G X 12.7MM misc Use as directed with insulin three times daily with meals and at bedtime. 120 each 0   • metFORMIN (GLUCOPHAGE) 500 MG tablet Take 500 mg by mouth 2 (Two) Times a Day With Meals.     • nitrofurantoin, macrocrystal-monohydrate, (Macrobid) 100 MG capsule Take 1 capsule by mouth 2 (Two) Times a Day. 56 capsule 1   • oxybutynin (DITROPAN) 5 MG tablet Take 1 tablet by mouth 2 (Two) Times a Day. 60 tablet 2   • oxyCODONE-acetaminophen (Percocet)  MG per tablet Take 1 tablet by mouth Every 4 (Four) Hours As Needed for Moderate Pain 12 tablet 0   • pantoprazole (PROTONIX) 40 MG EC tablet Take 40 mg by mouth daily.     • phenazopyridine (PYRIDIUM) 200 MG tablet Take 1 tablet by mouth 3 (Three) Times a Day  As Needed for Bladder Spasms. 20 tablet 0   • pioglitazone (ACTOS) 15 MG tablet      • polyethylene glycol (MIRALAX) pack packet Mix 17 g with liquid of choice and drink   Daily. 500 g 0   • tamsulosin (FLOMAX) 0.4 MG capsule 24 hr capsule Take 1 capsule by mouth Every Night. 90 capsule 4   • TRUEPLUS LANCETS 33G misc Use as directed to test blood sugar three times daily and and bedtime. 120 each 0   • zolpidem (AMBIEN) 10 MG tablet Take 10 mg by mouth at night as needed.       No current facility-administered medications for this visit.         Allergies:      No Known Allergies     Past Surgical History:     Past Surgical History:   Procedure Laterality Date   • CARDIAC CATHETERIZATION  03/03/2014   • CARDIAC SURGERY     • CARDIOVASCULAR STRESS TEST  04/04/2013   • CATARACT EXTRACTION     • CEREBRAL ANEURYSM REPAIR     • COLONOSCOPY     • CORONARY ANGIOPLASTY WITH STENT PLACEMENT     • CYSTOSCOPY LITHOLAPAXY BLADDER STONE EXTRACTION N/A 11/29/2017    Procedure: CYSTOSCOPY LITHOLAPAXY BLADDER STONE EXTRACTION;  Surgeon: Flavio Barrios MD;  Location: [facility];  Service:    • ECHO - CONVERTED  02/02/2014   • EYE SURGERY     • INNER EAR SURGERY     • SKIN BIOPSY     • TONSILLECTOMY     • URETHRAL DILATATION N/A 6/1/2020    Procedure: CYSTOSCOPY URETHRAL DILATATION;  Surgeon: Flavio Barrios MD;  Location: [facility];  Service: Urology;  Laterality: N/A;         Social History:     Social History     Socioeconomic History   • Marital status: Single     Spouse name: Not on file   • Number of children: Not on file   • Years of education: Not on file   • Highest education level: Not on file   Tobacco Use   • Smoking status: Current Every Day Smoker     Packs/day: 1.00     Years: 45.00     Pack years: 45.00     Types: Cigarettes   • Smokeless tobacco: Never Used   Substance and Sexual Activity   • Alcohol use: No   • Drug use: No   • Sexual activity: Defer     Birth control/protection: None       Family  History:     Family History   Problem Relation Age of Onset   • Osteoporosis Mother    • Cancer Mother         breast and lung   • Cancer Father         small cell cancer, kidney   • Heart disease Father    • Diabetes Father    • No Known Problems Sister    • Aneurysm Brother        Review of Systems:     Review of Systems   Constitutional: Negative.    HENT: Negative.    Eyes: Negative.    Respiratory: Negative.    Cardiovascular: Negative.    Gastrointestinal: Negative.    Endocrine: Negative.    Genitourinary: Positive for difficulty urinating, dysuria, frequency and hematuria.   Musculoskeletal: Negative.    Allergic/Immunologic: Negative.    Neurological: Positive for numbness.   Hematological: Negative.    Psychiatric/Behavioral: Negative.        Physical Exam:     Physical Exam   Constitutional: He is oriented to person, place, and time.   Ill-appearing wheelchair-bound   HENT:   Head: Normocephalic and atraumatic.   Eyes: Pupils are equal, round, and reactive to light. Conjunctivae and EOM are normal.   Neck: Normal range of motion.   Cardiovascular: Normal rate, regular rhythm, normal heart sounds and intact distal pulses.   Pulmonary/Chest: Effort normal and breath sounds normal.   Abdominal: Soft. Bowel sounds are normal.   Musculoskeletal: Normal range of motion.   Neurological: He is alert and oriented to person, place, and time. He has normal reflexes.   Skin: Skin is warm and dry.   Psychiatric: He has a normal mood and affect. His behavior is normal. Judgment and thought content normal.   Nursing note and vitals reviewed.      I have reviewed the following portions of the patient's history: allergies, current medications, past family history, past medical history, past social history, past surgical history, problem list and ROS and confirm it's accurate.      Procedure:       Assessment/Plan:   Chronic cystitis of an unknown etiology await the results of the biopsy.  He is a very ill-appearing man.   I recommended he be admitted to the hospital            Patient's Body mass index is 70.92 kg/m². BMI is above normal parameters. Recommendations include: educational material.              This document has been electronically signed by TRACE BELLO MD June 2, 2020 14:00

## 2020-06-03 PROBLEM — N30.20 CHRONIC CYSTITIS: Status: ACTIVE | Noted: 2020-06-03

## 2020-06-03 LAB
LAB AP CASE REPORT: NORMAL
PATH REPORT.FINAL DX SPEC: NORMAL

## 2020-06-05 ENCOUNTER — OFFICE VISIT (OUTPATIENT)
Dept: UROLOGY | Facility: CLINIC | Age: 65
End: 2020-06-05

## 2020-06-05 VITALS — BODY MASS INDEX: 42.66 KG/M2 | WEIGHT: 315 LBS | HEIGHT: 72 IN | TEMPERATURE: 98.1 F

## 2020-06-05 DIAGNOSIS — N32.89 BLADDER SPASMS: ICD-10-CM

## 2020-06-05 PROCEDURE — 99213 OFFICE O/P EST LOW 20 MIN: CPT | Performed by: NURSE PRACTITIONER

## 2020-06-05 RX ORDER — ONDANSETRON 4 MG/1
4 TABLET, FILM COATED ORAL DAILY PRN
Qty: 30 TABLET | Refills: 1 | OUTPATIENT
Start: 2020-06-05 | End: 2020-09-24

## 2020-06-05 RX ORDER — PHENAZOPYRIDINE HYDROCHLORIDE 200 MG/1
200 TABLET, FILM COATED ORAL 3 TIMES DAILY PRN
Qty: 20 TABLET | Refills: 0 | Status: SHIPPED | OUTPATIENT
Start: 2020-06-05 | End: 2020-10-21

## 2020-06-05 NOTE — PROGRESS NOTES
Chief Complaint:          Chief Complaint   Patient presents with   • WANTS CATH OUT       HPI:   65 y.o. male.  Presents to clinic today for follow up. He looks better and would like his Woodward Cath out.    Patient had significant incontinence, chronic bladder infections, requiring a an Anesthetic cystoscopy and urethral Dilation.  He initially presented with a bladder tumor, severe urinary frequency, urgency, and incontinence.  Maximum therapy with Flomax, finasteride, with minimal benefits.    We took his Woodward catheter out today.    Still pending biopsy results.      Past Medical History:        Past Medical History:   Diagnosis Date   • Arthritis    • Bladder stones    • BPH (benign prostatic hyperplasia)    • Brain aneurysm     1987   • Brain aneurysm 1987   • Cancer (CMS/[facility])     skin ca- felix   • Claustrophobia    • COPD (chronic obstructive pulmonary disease) (CMS/[facility])    • Coronary artery disease    • Diabetes (CMS/[facility])    • Difficulty urinating    • Fracture, finger    • GERD (gastroesophageal reflux disease)    • Hematuria    • High cholesterol    • History of sepsis    • Hypertension    • MI (myocardial infarction) (CMS/[facility])     2013   • Neuropathy     FEET AND LEGS   • Prostate disease    • Stroke (CMS/[facility])     X3 last one 9/2016   TIA   • TIA (transient ischemic attack)    • Unsteady gait    • UTI (urinary tract infection)          Current Meds:     Current Outpatient Medications   Medication Sig Dispense Refill   • aspirin  MG tablet Take 325 mg by mouth Every 6 (Six) Hours As Needed.     • Blood Glucose Monitoring Suppl (ACCU-CHEK JOSE G) device Use as directed to test blood sugar three times daily and and bedtime. 1 each 0   • clopidogrel (PLAVIX) 75 MG tablet      • diclofenac (VOLTAREN) 75 MG EC tablet Take 1 tablet by mouth 2 (Two) Times a Day. 30 tablet 0   • doxazosin (CARDURA) 8 MG tablet Take 8 mg by mouth Every Morning.     • ferrous sulfate 325 (65 FE) MG tablet      • finasteride  (PROSCAR) 5 MG tablet Take 1 tablet by mouth Daily. 30 tablet 5   • furosemide (LASIX) 20 MG tablet Take 20 mg by mouth Daily As Needed.     • gabapentin (NEURONTIN) 600 MG tablet 600 mg 3 (Three) Times a Day.     • glimepiride (AMARYL) 4 MG tablet Take 4 mg by mouth Every Morning Before Breakfast.     • glucose blood test strip Use as directed to test blood sugar three times daily and and bedtime. 120 each 0   • insulin aspart (novoLOG FLEXPEN) 100 UNIT/ML solution pen-injector sc pen Use as directed 4 times daily per sliding scale (0 to 7 units/dose). (Patient taking differently: 2 (Two) Times a Day.) 15 mL 0   • insulin detemir (LEVEMIR) 100 UNIT/ML injection Inject 15 Units under the skin Daily. 15 mL 0   • Insulin Pen Needle 29G X 12.7MM misc Use as directed with insulin three times daily with meals and at bedtime. 120 each 0   • metFORMIN (GLUCOPHAGE) 500 MG tablet Take 500 mg by mouth 2 (Two) Times a Day With Meals.     • nitrofurantoin, macrocrystal-monohydrate, (Macrobid) 100 MG capsule Take 1 capsule by mouth 2 (Two) Times a Day. 56 capsule 1   • nitrofurantoin, macrocrystal-monohydrate, (Macrobid) 100 MG capsule Take 1 capsule by mouth Daily. 30 capsule 3   • ondansetron (Zofran) 4 MG tablet Take 1 tablet by mouth Daily As Needed for Nausea or Vomiting. 30 tablet 1   • oxybutynin (DITROPAN) 5 MG tablet Take 1 tablet by mouth 2 (Two) Times a Day. 60 tablet 2   • oxyCODONE-acetaminophen (Percocet)  MG per tablet Take 1 tablet by mouth Every 4 (Four) Hours As Needed for Moderate Pain 12 tablet 0   • pantoprazole (PROTONIX) 40 MG EC tablet Take 40 mg by mouth daily.     • phenazopyridine (Pyridium) 200 MG tablet Take 1 tablet by mouth 3 (Three) Times a Day As Needed for Bladder Spasms. 20 tablet 0   • pioglitazone (ACTOS) 15 MG tablet      • polyethylene glycol (MIRALAX) pack packet Mix 17 g with liquid of choice and drink   Daily. 500 g 0   • tamsulosin (FLOMAX) 0.4 MG capsule 24 hr capsule Take 1  capsule by mouth Every Night. 90 capsule 4   • TRUEPLUS LANCETS 33G misc Use as directed to test blood sugar three times daily and and bedtime. 120 each 0   • zolpidem (AMBIEN) 10 MG tablet Take 10 mg by mouth at night as needed.       No current facility-administered medications for this visit.         Allergies:      No Known Allergies     Past Surgical History:     Past Surgical History:   Procedure Laterality Date   • CARDIAC CATHETERIZATION  03/03/2014   • CARDIAC SURGERY     • CARDIOVASCULAR STRESS TEST  04/04/2013   • CATARACT EXTRACTION     • CEREBRAL ANEURYSM REPAIR     • COLONOSCOPY     • CORONARY ANGIOPLASTY WITH STENT PLACEMENT     • CYSTOSCOPY LITHOLAPAXY BLADDER STONE EXTRACTION N/A 11/29/2017    Procedure: CYSTOSCOPY LITHOLAPAXY BLADDER STONE EXTRACTION;  Surgeon: Flavio Barrios MD;  Location: [facility];  Service:    • ECHO - CONVERTED  02/02/2014   • EYE SURGERY     • INNER EAR SURGERY     • SKIN BIOPSY     • TONSILLECTOMY     • URETHRAL DILATATION N/A 6/1/2020    Procedure: CYSTOSCOPY URETHRAL DILATATION;  Surgeon: Flavio Barrios MD;  Location: [facility];  Service: Urology;  Laterality: N/A;         Social History:     Social History     Socioeconomic History   • Marital status: Single     Spouse name: Not on file   • Number of children: Not on file   • Years of education: Not on file   • Highest education level: Not on file   Tobacco Use   • Smoking status: Current Every Day Smoker     Packs/day: 1.00     Years: 45.00     Pack years: 45.00     Types: Cigarettes   • Smokeless tobacco: Never Used   Substance and Sexual Activity   • Alcohol use: No   • Drug use: No   • Sexual activity: Defer     Birth control/protection: None       Family History:     Family History   Problem Relation Age of Onset   • Osteoporosis Mother    • Cancer Mother         breast and lung   • Cancer Father         small cell cancer, kidney   • Heart disease Father    • Diabetes Father    • No Known Problems  Sister    • Aneurysm Brother        Review of Systems:     Review of Systems   Constitutional: Positive for activity change, appetite change and fatigue. Negative for chills and fever.   HENT: Negative for congestion and sinus pressure.    Respiratory: Negative for chest tightness and shortness of breath.    Cardiovascular: Negative for chest pain.   Gastrointestinal: Negative for abdominal pain, constipation, diarrhea, nausea and vomiting.   Genitourinary: Positive for difficulty urinating, dysuria and urgency. Negative for flank pain, frequency and hematuria.   Musculoskeletal: Negative for back pain and neck pain.   Skin: Negative for rash.   Neurological: Negative for dizziness and headaches.   Hematological: Does not bruise/bleed easily.   Psychiatric/Behavioral: The patient is not nervous/anxious.    All other systems reviewed and are negative.      Physical Exam:     Physical Exam   Constitutional: He is oriented to person, place, and time. He appears well-developed and well-nourished. He appears distressed.   HENT:   Head: Normocephalic and atraumatic.   Right Ear: External ear normal.   Left Ear: External ear normal.   Eyes: Pupils are equal, round, and reactive to light. Conjunctivae and EOM are normal. Right eye exhibits no discharge. Left eye exhibits no discharge.   Neck: Normal range of motion. Neck supple. No tracheal deviation present. No thyromegaly present.   Cardiovascular: Normal rate and regular rhythm. Exam reveals no friction rub.   No murmur heard.  Pulmonary/Chest: Effort normal and breath sounds normal. No stridor. No respiratory distress.   Abdominal: Soft. Bowel sounds are normal. He exhibits no distension. There is tenderness. There is no guarding.   Genitourinary: Rectum normal, testes normal and penis normal. Rectal exam shows guaiac negative stool. Uncircumcised. No penile tenderness. No discharge found.   Musculoskeletal: Normal range of motion. He exhibits tenderness. He exhibits no  edema or deformity.   Neurological: He is alert and oriented to person, place, and time. No cranial nerve deficit. Coordination normal.   Skin: Skin is warm and dry. Capillary refill takes less than 2 seconds. No pallor.   Psychiatric: He has a normal mood and affect. His behavior is normal. Judgment and thought content normal.       Procedure:       Assessment/Plan:     Chronic Cystitis:  Presents to clinic today for follow up. He looks better and would like his Woodward Cath out.  Patient had significant incontinence, chronic bladder infections, requiring a an Anesthetic cystoscopy and urethral Dilation.  He initially presented with a bladder tumor( Biopsied), severe urinary frequency, urgency, and incontinence.  Maximum therapy with Flomax, finasteride, with minimal benefits.    We took his Woodward catheter out today, encourage p.o. fluids at least 1 to 2 L daily, continue antibiotic prophylaxis with Macrobid as prescribed.     Given him some nausea medicine, to enhance his appetite, function encourage p.o. Intake.  He is also to keep a better control on his blood sugars.    Still pending biopsy results.    Patient reports that he is not currently experiencing any symptoms of urinary incontinence.                This document has been electronically signed by Griselda Cheng-Akwa, ARNP June 11, 2020 07:47

## 2020-07-01 ENCOUNTER — TELEPHONE (OUTPATIENT)
Dept: UROLOGY | Facility: CLINIC | Age: 65
End: 2020-07-01

## 2020-07-01 DIAGNOSIS — R30.0 DYSURIA: ICD-10-CM

## 2020-07-01 RX ORDER — NITROFURANTOIN 25; 75 MG/1; MG/1
100 CAPSULE ORAL DAILY
Qty: 30 CAPSULE | Refills: 3 | OUTPATIENT
Start: 2020-07-01 | End: 2020-09-24

## 2020-07-10 ENCOUNTER — OFFICE VISIT (OUTPATIENT)
Dept: UROLOGY | Facility: CLINIC | Age: 65
End: 2020-07-10

## 2020-07-10 VITALS — HEIGHT: 72 IN | BODY MASS INDEX: 70.84 KG/M2

## 2020-07-10 DIAGNOSIS — N30.20 CHRONIC CYSTITIS: Primary | ICD-10-CM

## 2020-07-10 PROCEDURE — 99213 OFFICE O/P EST LOW 20 MIN: CPT | Performed by: UROLOGY

## 2020-07-10 NOTE — PROGRESS NOTES
Chief Complaint:          Chief Complaint   Patient presents with   • Results       HPI:   65 y.o. male  previously a patient here and saw Ginna in 2014 with BPH placed on doxazosin.  Apparently on Sunday started with significant pain and strangury as well as gross hematuria presents today clot retention having significant pain.  No fevers or chills.  He is in significant pain he is on Plavix for heart disease and stents.  I went ahead and anchored a catheter to gravity drainage and irrigated 5 L of fluid with a large amount of clot and cessation of the blood I told him to see the prostate bleeding or bladder tumor.  He has no flank pain or upper tract symptomatology whatsoever.  I left a 22 Setswana catheter gravity drainage its clear him gentamicin and have a CBC and a BMP pending at the time of this dictation.  He had she was admitted the night after the above aforementioned visit.  In fact he was found to have massive BPH and bladder stones as the probable etiology of his symptomatology.  He actually did well after release.  He voided 500 cc after his catheter occluded knee pain now presents for cystoscopy.  Cystoscopy showed a large amount of clot I'm going to set him up for cystoscopy clot evacuation cystoscopy litholapaxy and follow-up based on this on Wednesday morning.  He is maximized with regard to his medical therapy.  He still has poorly controlled diabetes and significant heart disease but he is off the anticoagulant.           He returns today he's doing fantastic he has a great stream he gets up every 2 hours at night most likely secondary to his poorly controlled diabetes he has no pain with urination he has no bleeding he is a solid stream.  I told him that's okay to restart his anticoagulation but he needs to check with his cardiologist.  Overall is done extremely well.  He has no significant prostatic obstruction and I expect him to continue on finasteride and doing extremely well and check him  back in 6 months with a PSA    Interesting gentleman who returns today.  He had been on for blocker and finasteride.  He switched to taking the alpha-blocker only once a week and went off the finasteride completely and now is wetting wearing depends he thought it was from the medicine but I explained actually the medicine was preventing it he has 3+ leukocytes in the urine probably was secondary to irritation a postvoid residual of 89 cc he has no allergies he has urge urge incontinence.  With BPH he has greater than a 50 g prostate to palpation.  I am going to reinitiate therapy and set him up for follow-up.  I explained to him extensively the purpose of these medications as to prevent the problem rather than the other way around.  He returns today he got off his medication of his own volition and had a significant increase in his frequency there is a question of bladder stones.  Today he presents for cystoscopy basically I saw the enlarged prostate.  No stones but a snowstorm of debris and certainly is well on his way to developing stones but he is doing symptomatically order back on his appropriate medication and will discuss to keep him on it  He went to emergency room with frequency and urgency.  Basically was found to have a questionable urinary tract infection.  His urine culture was negative however.  Going to treat him for prostatitis and follow back up with him based on this.  They placed him on cefdinir 300 mg twice a day x7 days.  He had a CT of his chest here his urine today was negative it was positive I will await the culture here and I will follow-up with him based on this  He returns today.  His last culture was negative today he has a right black eye.  He still has the frequency.  His culture from 1223 was no growth final.  He has 2+ leukocytes which I cultured I am to set him up for cystoscopy I suspect were dealing with this his bladder stone has recurred he has been very noncompliant about his  medications  He returns today is postop.  He originally had bladder stones and did quite well but was very noncompliant with his meds he is noncompliant with his diabetes.  He basically has incontinence.  And chronic infections he had a cystoscopy and urethral dilation the cystoscopy revealed what appears to be an alkaline thick cystitis which was biopsied and I am awaiting Confirmation I would leave his catheter in as it is much more comfortable form he is not eating he looks terrible he states he has fevers at home but he was afebrile here in the office.  He also said he had some left tingling in his hand and I told him he should go to the emergency room to get admitted.  I gave him 1 dose of gentamicin today.  I would have him maintain himself on the Macrobid.  I will see him back in 1 week.  I am awaiting the results of the bladder biopsy unfortunately, I suspect this is a bad Uhlman for his bladder and if it is truly what I think it is there is not can be a great deal of due until he gets his sugar under control  He is currently doing somewhat better I reviewed the pathology results with him.  This was squamous metaplasia.  This is a consequence of his sugar under control he is doing well and working to continue observation I will see him back in 6 months nothing urologically I can offer him until he gets his sugar under adequate control      Past Medical History:        Past Medical History:   Diagnosis Date   • Arthritis    • Bladder stones    • BPH (benign prostatic hyperplasia)    • Brain aneurysm     1987   • Brain aneurysm 1987   • Cancer (CMS/[facility])     skin ca- felix   • Claustrophobia    • COPD (chronic obstructive pulmonary disease) (CMS/[facility])    • Coronary artery disease    • Diabetes (CMS/[facility])    • Difficulty urinating    • Fracture, finger    • GERD (gastroesophageal reflux disease)    • Hematuria    • High cholesterol    • History of sepsis    • Hypertension    • MI (myocardial infarction) (CMS/[facility])      2013   • Neuropathy     FEET AND LEGS   • Prostate disease    • Stroke (CMS/[facility])     X3 last one 9/2016   TIA   • TIA (transient ischemic attack)    • Unsteady gait    • UTI (urinary tract infection)          Current Meds:     Current Outpatient Medications   Medication Sig Dispense Refill   • aspirin  MG tablet Take 325 mg by mouth Every 6 (Six) Hours As Needed.     • Blood Glucose Monitoring Suppl (ACCU-CHEK JOSE G) device Use as directed to test blood sugar three times daily and and bedtime. 1 each 0   • clopidogrel (PLAVIX) 75 MG tablet      • diclofenac (VOLTAREN) 75 MG EC tablet Take 1 tablet by mouth 2 (Two) Times a Day. 30 tablet 0   • doxazosin (CARDURA) 8 MG tablet Take 8 mg by mouth Every Morning.     • ferrous sulfate 325 (65 FE) MG tablet      • finasteride (PROSCAR) 5 MG tablet Take 1 tablet by mouth Daily. 30 tablet 5   • furosemide (LASIX) 20 MG tablet Take 20 mg by mouth Daily As Needed.     • gabapentin (NEURONTIN) 600 MG tablet 600 mg 3 (Three) Times a Day.     • glimepiride (AMARYL) 4 MG tablet Take 4 mg by mouth Every Morning Before Breakfast.     • glucose blood test strip Use as directed to test blood sugar three times daily and and bedtime. 120 each 0   • insulin aspart (novoLOG FLEXPEN) 100 UNIT/ML solution pen-injector sc pen Use as directed 4 times daily per sliding scale (0 to 7 units/dose). (Patient taking differently: 2 (Two) Times a Day.) 15 mL 0   • insulin detemir (LEVEMIR) 100 UNIT/ML injection Inject 15 Units under the skin Daily. 15 mL 0   • Insulin Pen Needle 29G X 12.7MM misc Use as directed with insulin three times daily with meals and at bedtime. 120 each 0   • metFORMIN (GLUCOPHAGE) 500 MG tablet Take 500 mg by mouth 2 (Two) Times a Day With Meals.     • nitrofurantoin, macrocrystal-monohydrate, (Macrobid) 100 MG capsule Take 1 capsule by mouth 2 (Two) Times a Day. 56 capsule 1   • nitrofurantoin, macrocrystal-monohydrate, (Macrobid) 100 MG capsule Take 1 capsule by  mouth Daily. 30 capsule 3   • ondansetron (Zofran) 4 MG tablet Take 1 tablet by mouth Daily As Needed for Nausea or Vomiting. 30 tablet 1   • oxybutynin (DITROPAN) 5 MG tablet Take 1 tablet by mouth 2 (Two) Times a Day. 60 tablet 2   • oxyCODONE-acetaminophen (Percocet)  MG per tablet Take 1 tablet by mouth Every 4 (Four) Hours As Needed for Moderate Pain 12 tablet 0   • pantoprazole (PROTONIX) 40 MG EC tablet Take 40 mg by mouth daily.     • phenazopyridine (Pyridium) 200 MG tablet Take 1 tablet by mouth 3 (Three) Times a Day As Needed for Bladder Spasms. 20 tablet 0   • pioglitazone (ACTOS) 15 MG tablet      • polyethylene glycol (MIRALAX) pack packet Mix 17 g with liquid of choice and drink   Daily. 500 g 0   • tamsulosin (FLOMAX) 0.4 MG capsule 24 hr capsule Take 1 capsule by mouth Every Night. 90 capsule 4   • TRUEPLUS LANCETS 33G misc Use as directed to test blood sugar three times daily and and bedtime. 120 each 0   • zolpidem (AMBIEN) 10 MG tablet Take 10 mg by mouth at night as needed.       No current facility-administered medications for this visit.         Allergies:      No Known Allergies     Past Surgical History:     Past Surgical History:   Procedure Laterality Date   • CARDIAC CATHETERIZATION  03/03/2014   • CARDIAC SURGERY     • CARDIOVASCULAR STRESS TEST  04/04/2013   • CATARACT EXTRACTION     • CEREBRAL ANEURYSM REPAIR     • COLONOSCOPY     • CORONARY ANGIOPLASTY WITH STENT PLACEMENT     • CYSTOSCOPY LITHOLAPAXY BLADDER STONE EXTRACTION N/A 11/29/2017    Procedure: CYSTOSCOPY LITHOLAPAXY BLADDER STONE EXTRACTION;  Surgeon: Flavio Barrios MD;  Location: [facility];  Service:    • ECHO - CONVERTED  02/02/2014   • EYE SURGERY     • INNER EAR SURGERY     • SKIN BIOPSY     • TONSILLECTOMY     • URETHRAL DILATATION N/A 6/1/2020    Procedure: CYSTOSCOPY URETHRAL DILATATION;  Surgeon: Flavio Barrios MD;  Location: [facility];  Service: Urology;  Laterality: N/A;         Social  History:     Social History     Socioeconomic History   • Marital status: Single     Spouse name: Not on file   • Number of children: Not on file   • Years of education: Not on file   • Highest education level: Not on file   Tobacco Use   • Smoking status: Current Every Day Smoker     Packs/day: 1.00     Years: 45.00     Pack years: 45.00     Types: Cigarettes   • Smokeless tobacco: Never Used   Substance and Sexual Activity   • Alcohol use: No   • Drug use: No   • Sexual activity: Defer     Birth control/protection: None       Family History:     Family History   Problem Relation Age of Onset   • Osteoporosis Mother    • Cancer Mother         breast and lung   • Cancer Father         small cell cancer, kidney   • Heart disease Father    • Diabetes Father    • No Known Problems Sister    • Aneurysm Brother        Review of Systems:     Review of Systems   Constitutional: Negative.    HENT: Negative.    Eyes: Negative.    Respiratory: Negative.    Cardiovascular: Negative.    Gastrointestinal: Negative.    Endocrine: Negative.    Musculoskeletal: Negative.    Allergic/Immunologic: Negative.    Neurological: Negative.    Hematological: Negative.    Psychiatric/Behavioral: Negative.        Physical Exam:     Physical Exam   Constitutional: He is oriented to person, place, and time. He appears well-developed and well-nourished.   HENT:   Head: Normocephalic and atraumatic.   Eyes: Pupils are equal, round, and reactive to light. Conjunctivae and EOM are normal.   Neck: Normal range of motion.   Cardiovascular: Normal rate, regular rhythm, normal heart sounds and intact distal pulses.   Pulmonary/Chest: Effort normal and breath sounds normal.   Abdominal: Soft. Bowel sounds are normal.   Musculoskeletal: Normal range of motion.   Neurological: He is alert and oriented to person, place, and time. He has normal reflexes.   Skin: Skin is warm and dry.   Psychiatric: He has a normal mood and affect. His behavior is normal.  Judgment and thought content normal.   Nursing note and vitals reviewed.      I have reviewed the following portions of the patient's history: allergies, current medications, past family history, past medical history, past social history, past surgical history, problem list and ROS and confirm it's accurate.      Procedure:       Assessment/Plan:   Chronic cystitis-status post biopsy indicating squamous metaplasia some degree of obstruction but not dramatically so he went and went off his medications for some reason he is back on them and doing much better.  I believe this problem is a direct consequence of his's extremely poorly controlled sugar and he is going to have to make this a priority            Patient's Body mass index is 70.84 kg/m². BMI is above normal parameters. Recommendations include: educational material.              This document has been electronically signed by TRACE BELLO MD July 10, 2020 14:42

## 2020-08-29 DIAGNOSIS — R35.0 FREQUENCY OF MICTURITION: ICD-10-CM

## 2020-08-31 RX ORDER — OXYBUTYNIN CHLORIDE 5 MG/1
TABLET ORAL
Qty: 60 TABLET | Refills: 1 | Status: SHIPPED | OUTPATIENT
Start: 2020-08-31 | End: 2020-09-08 | Stop reason: SDUPTHER

## 2020-09-08 ENCOUNTER — TELEPHONE (OUTPATIENT)
Dept: UROLOGY | Facility: CLINIC | Age: 65
End: 2020-09-08

## 2020-09-08 DIAGNOSIS — R35.0 FREQUENCY OF MICTURITION: ICD-10-CM

## 2020-09-08 RX ORDER — OXYBUTYNIN CHLORIDE 5 MG/1
5 TABLET ORAL 2 TIMES DAILY
Qty: 60 TABLET | Refills: 3 | Status: SHIPPED | OUTPATIENT
Start: 2020-09-08 | End: 2020-10-20 | Stop reason: SDUPTHER

## 2020-09-24 ENCOUNTER — HOSPITAL ENCOUNTER (EMERGENCY)
Facility: HOSPITAL | Age: 65
Discharge: HOME | End: 2020-09-24
Attending: EMERGENCY MEDICINE | Admitting: EMERGENCY MEDICINE

## 2020-09-24 ENCOUNTER — APPOINTMENT (OUTPATIENT)
Dept: CT IMAGING | Facility: HOSPITAL | Age: 65
End: 2020-09-24

## 2020-09-24 ENCOUNTER — APPOINTMENT (OUTPATIENT)
Dept: GENERAL RADIOLOGY | Facility: HOSPITAL | Age: 65
End: 2020-09-24

## 2020-09-24 VITALS
HEIGHT: 72 IN | HEART RATE: 77 BPM | WEIGHT: 240 LBS | BODY MASS INDEX: 32.51 KG/M2 | OXYGEN SATURATION: 98 % | SYSTOLIC BLOOD PRESSURE: 163 MMHG | RESPIRATION RATE: 16 BRPM | TEMPERATURE: 97.2 F | DIASTOLIC BLOOD PRESSURE: 89 MMHG

## 2020-09-24 DIAGNOSIS — A49.9 UTI (URINARY TRACT INFECTION), BACTERIAL: Primary | ICD-10-CM

## 2020-09-24 DIAGNOSIS — N39.0 UTI (URINARY TRACT INFECTION), BACTERIAL: Primary | ICD-10-CM

## 2020-09-24 LAB
ALBUMIN SERPL-MCNC: 3.92 G/DL (ref 3.5–5.2)
ALBUMIN/GLOB SERPL: 1 G/DL
ALP SERPL-CCNC: 116 U/L (ref 39–117)
ALT SERPL W P-5'-P-CCNC: 9 U/L (ref 1–41)
AMYLASE SERPL-CCNC: 29 U/L (ref 28–100)
ANION GAP SERPL CALCULATED.3IONS-SCNC: 12.3 MMOL/L (ref 5–15)
ANISOCYTOSIS BLD QL: NORMAL
AST SERPL-CCNC: 15 U/L (ref 1–40)
BACTERIA UR QL AUTO: ABNORMAL /HPF
BASOPHILS # BLD AUTO: 0.05 10*3/MM3 (ref 0–0.2)
BASOPHILS NFR BLD AUTO: 0.6 % (ref 0–1.5)
BILIRUB SERPL-MCNC: 0.2 MG/DL (ref 0–1.2)
BILIRUB UR QL STRIP: NEGATIVE
BUN SERPL-MCNC: 5 MG/DL (ref 8–23)
BUN/CREAT SERPL: 5.4 (ref 7–25)
CALCIUM SPEC-SCNC: 9.1 MG/DL (ref 8.6–10.5)
CHLORIDE SERPL-SCNC: 99 MMOL/L (ref 98–107)
CLARITY UR: ABNORMAL
CO2 SERPL-SCNC: 20.7 MMOL/L (ref 22–29)
COLOR UR: YELLOW
CREAT SERPL-MCNC: 0.93 MG/DL (ref 0.76–1.27)
CRP SERPL-MCNC: 0.86 MG/DL (ref 0–0.5)
D-LACTATE SERPL-SCNC: 2.3 MMOL/L (ref 0.5–2)
DEPRECATED RDW RBC AUTO: 48.8 FL (ref 37–54)
EOSINOPHIL # BLD AUTO: 0.07 10*3/MM3 (ref 0–0.4)
EOSINOPHIL NFR BLD AUTO: 0.9 % (ref 0.3–6.2)
ERYTHROCYTE [DISTWIDTH] IN BLOOD BY AUTOMATED COUNT: 19 % (ref 12.3–15.4)
FLUAV AG NPH QL: NEGATIVE
FLUBV AG NPH QL IA: NEGATIVE
GFR SERPL CREATININE-BSD FRML MDRD: 82 ML/MIN/1.73
GLOBULIN UR ELPH-MCNC: 3.8 GM/DL
GLUCOSE SERPL-MCNC: 156 MG/DL (ref 65–99)
GLUCOSE UR STRIP-MCNC: NEGATIVE MG/DL
HCT VFR BLD AUTO: 30.1 % (ref 37.5–51)
HGB BLD-MCNC: 9 G/DL (ref 13–17.7)
HGB UR QL STRIP.AUTO: ABNORMAL
HOLD SPECIMEN: NORMAL
HOLD SPECIMEN: NORMAL
HYALINE CASTS UR QL AUTO: ABNORMAL /LPF
HYPOCHROMIA BLD QL: NORMAL
IMM GRANULOCYTES # BLD AUTO: 0.02 10*3/MM3 (ref 0–0.05)
IMM GRANULOCYTES NFR BLD AUTO: 0.2 % (ref 0–0.5)
KETONES UR QL STRIP: NEGATIVE
LACTATE HOLD SPECIMEN: NORMAL
LEUKOCYTE ESTERASE UR QL STRIP.AUTO: ABNORMAL
LIPASE SERPL-CCNC: 13 U/L (ref 13–60)
LYMPHOCYTES # BLD AUTO: 2.38 10*3/MM3 (ref 0.7–3.1)
LYMPHOCYTES NFR BLD AUTO: 29.7 % (ref 19.6–45.3)
MAGNESIUM SERPL-MCNC: 1.9 MG/DL (ref 1.6–2.4)
MCH RBC QN AUTO: 21.6 PG (ref 26.6–33)
MCHC RBC AUTO-ENTMCNC: 29.9 G/DL (ref 31.5–35.7)
MCV RBC AUTO: 72.2 FL (ref 79–97)
MICROCYTES BLD QL: NORMAL
MONOCYTES # BLD AUTO: 0.98 10*3/MM3 (ref 0.1–0.9)
MONOCYTES NFR BLD AUTO: 12.2 % (ref 5–12)
NEUTROPHILS NFR BLD AUTO: 4.52 10*3/MM3 (ref 1.7–7)
NEUTROPHILS NFR BLD AUTO: 56.4 % (ref 42.7–76)
NITRITE UR QL STRIP: NEGATIVE
NRBC BLD AUTO-RTO: 0 /100 WBC (ref 0–0.2)
NT-PROBNP SERPL-MCNC: 761 PG/ML (ref 0–900)
PH UR STRIP.AUTO: 8.5 [PH] (ref 5–8)
PLAT MORPH BLD: NORMAL
PLATELET # BLD AUTO: 276 10*3/MM3 (ref 140–450)
PMV BLD AUTO: 9.8 FL (ref 6–12)
POTASSIUM SERPL-SCNC: 4.2 MMOL/L (ref 3.5–5.2)
PROT SERPL-MCNC: 7.7 G/DL (ref 6–8.5)
PROT UR QL STRIP: ABNORMAL
RBC # BLD AUTO: 4.17 10*6/MM3 (ref 4.14–5.8)
RBC # UR: ABNORMAL /HPF
REF LAB TEST METHOD: ABNORMAL
SARS-COV-2 RDRP RESP QL NAA+PROBE: NOT DETECTED
SODIUM SERPL-SCNC: 132 MMOL/L (ref 136–145)
SP GR UR STRIP: 1.01 (ref 1–1.03)
SQUAMOUS #/AREA URNS HPF: ABNORMAL /HPF
T4 FREE SERPL-MCNC: 1.16 NG/DL (ref 0.93–1.7)
TROPONIN T SERPL-MCNC: <0.01 NG/ML (ref 0–0.03)
TSH SERPL DL<=0.05 MIU/L-ACNC: 2.85 UIU/ML (ref 0.27–4.2)
UROBILINOGEN UR QL STRIP: ABNORMAL
WBC # BLD AUTO: 8.02 10*3/MM3 (ref 3.4–10.8)
WBC UR QL AUTO: ABNORMAL /HPF
WHOLE BLOOD HOLD SPECIMEN: NORMAL
WHOLE BLOOD HOLD SPECIMEN: NORMAL

## 2020-09-24 PROCEDURE — 83735 ASSAY OF MAGNESIUM: CPT | Performed by: EMERGENCY MEDICINE

## 2020-09-24 PROCEDURE — 84443 ASSAY THYROID STIM HORMONE: CPT | Performed by: EMERGENCY MEDICINE

## 2020-09-24 PROCEDURE — 25010000002 ONDANSETRON PER 1 MG: Performed by: EMERGENCY MEDICINE

## 2020-09-24 PROCEDURE — 80053 COMPREHEN METABOLIC PANEL: CPT | Performed by: EMERGENCY MEDICINE

## 2020-09-24 PROCEDURE — 83605 ASSAY OF LACTIC ACID: CPT | Performed by: EMERGENCY MEDICINE

## 2020-09-24 PROCEDURE — 87635 SARS-COV-2 COVID-19 AMP PRB: CPT | Performed by: EMERGENCY MEDICINE

## 2020-09-24 PROCEDURE — 25010000002 CEFTRIAXONE PER 250 MG: Performed by: EMERGENCY MEDICINE

## 2020-09-24 PROCEDURE — 93005 ELECTROCARDIOGRAM TRACING: CPT | Performed by: EMERGENCY MEDICINE

## 2020-09-24 PROCEDURE — 87040 BLOOD CULTURE FOR BACTERIA: CPT | Performed by: EMERGENCY MEDICINE

## 2020-09-24 PROCEDURE — 71045 X-RAY EXAM CHEST 1 VIEW: CPT | Performed by: RADIOLOGY

## 2020-09-24 PROCEDURE — 93010 ELECTROCARDIOGRAM REPORT: CPT | Performed by: INTERNAL MEDICINE

## 2020-09-24 PROCEDURE — 84439 ASSAY OF FREE THYROXINE: CPT | Performed by: EMERGENCY MEDICINE

## 2020-09-24 PROCEDURE — 96375 TX/PRO/DX INJ NEW DRUG ADDON: CPT

## 2020-09-24 PROCEDURE — 96365 THER/PROPH/DIAG IV INF INIT: CPT

## 2020-09-24 PROCEDURE — 82150 ASSAY OF AMYLASE: CPT | Performed by: EMERGENCY MEDICINE

## 2020-09-24 PROCEDURE — 99285 EMERGENCY DEPT VISIT HI MDM: CPT

## 2020-09-24 PROCEDURE — 86140 C-REACTIVE PROTEIN: CPT | Performed by: EMERGENCY MEDICINE

## 2020-09-24 PROCEDURE — 85025 COMPLETE CBC W/AUTO DIFF WBC: CPT | Performed by: EMERGENCY MEDICINE

## 2020-09-24 PROCEDURE — 87804 INFLUENZA ASSAY W/OPTIC: CPT | Performed by: EMERGENCY MEDICINE

## 2020-09-24 PROCEDURE — 96361 HYDRATE IV INFUSION ADD-ON: CPT

## 2020-09-24 PROCEDURE — 71045 X-RAY EXAM CHEST 1 VIEW: CPT

## 2020-09-24 PROCEDURE — 81001 URINALYSIS AUTO W/SCOPE: CPT | Performed by: EMERGENCY MEDICINE

## 2020-09-24 PROCEDURE — 84484 ASSAY OF TROPONIN QUANT: CPT | Performed by: EMERGENCY MEDICINE

## 2020-09-24 PROCEDURE — 83690 ASSAY OF LIPASE: CPT | Performed by: EMERGENCY MEDICINE

## 2020-09-24 PROCEDURE — 87086 URINE CULTURE/COLONY COUNT: CPT | Performed by: EMERGENCY MEDICINE

## 2020-09-24 PROCEDURE — 85007 BL SMEAR W/DIFF WBC COUNT: CPT | Performed by: EMERGENCY MEDICINE

## 2020-09-24 PROCEDURE — 51798 US URINE CAPACITY MEASURE: CPT

## 2020-09-24 PROCEDURE — 83880 ASSAY OF NATRIURETIC PEPTIDE: CPT | Performed by: EMERGENCY MEDICINE

## 2020-09-24 RX ORDER — ONDANSETRON 2 MG/ML
4 INJECTION INTRAMUSCULAR; INTRAVENOUS ONCE
Status: COMPLETED | OUTPATIENT
Start: 2020-09-24 | End: 2020-09-24

## 2020-09-24 RX ORDER — SODIUM CHLORIDE 9 MG/ML
125 INJECTION, SOLUTION INTRAVENOUS CONTINUOUS
Status: DISCONTINUED | OUTPATIENT
Start: 2020-09-24 | End: 2020-09-24 | Stop reason: HOSPADM

## 2020-09-24 RX ORDER — CEFDINIR 300 MG/1
300 CAPSULE ORAL 2 TIMES DAILY
Qty: 18 CAPSULE | Refills: 0 | Status: SHIPPED | OUTPATIENT
Start: 2020-09-24 | End: 2020-10-03

## 2020-09-24 RX ORDER — PANTOPRAZOLE SODIUM 40 MG/10ML
40 INJECTION, POWDER, LYOPHILIZED, FOR SOLUTION INTRAVENOUS ONCE
Status: COMPLETED | OUTPATIENT
Start: 2020-09-24 | End: 2020-09-24

## 2020-09-24 RX ORDER — ONDANSETRON 4 MG/1
4 TABLET, ORALLY DISINTEGRATING ORAL EVERY 6 HOURS PRN
Qty: 20 TABLET | Refills: 0 | Status: SHIPPED | OUTPATIENT
Start: 2020-09-24 | End: 2021-03-11

## 2020-09-24 RX ADMIN — SODIUM CHLORIDE 125 ML/HR: 9 INJECTION, SOLUTION INTRAVENOUS at 19:03

## 2020-09-24 RX ADMIN — ONDANSETRON 4 MG: 2 INJECTION INTRAMUSCULAR; INTRAVENOUS at 18:59

## 2020-09-24 RX ADMIN — SODIUM CHLORIDE 500 ML: 9 INJECTION, SOLUTION INTRAVENOUS at 19:03

## 2020-09-24 RX ADMIN — PANTOPRAZOLE SODIUM 40 MG: 40 INJECTION, POWDER, FOR SOLUTION INTRAVENOUS at 18:59

## 2020-09-24 RX ADMIN — CEFTRIAXONE 2 G: 2 INJECTION, POWDER, FOR SOLUTION INTRAMUSCULAR; INTRAVENOUS at 20:43

## 2020-09-24 NOTE — ED PROVIDER NOTES
Subjective   Patient is a 65-year-old male who complains of the onset this morning of nausea, vomiting, chills, hot flashes.  He states that he chronically has breathing difficulty, states his breathing is at his baseline, no more difficult than it always is.  He denies fever, headaches, neck pain, back pain, chest pain, abdominal pain, diarrhea, hematemesis, hematochezia or melena, syncope or near syncope, focal numbness or weakness, other symptoms or other complaints.          Review of Systems   Constitutional: Positive for chills and fever (Subjective). Negative for diaphoresis.   HENT: Negative for ear pain, sore throat and trouble swallowing.    Eyes: Negative for photophobia and pain.   Respiratory: Negative for shortness of breath, wheezing and stridor.    Cardiovascular: Negative for chest pain and palpitations.   Gastrointestinal: Positive for nausea and vomiting. Negative for abdominal distention, abdominal pain, blood in stool and diarrhea.   Endocrine: Negative for polydipsia and polyphagia.   Genitourinary: Negative for difficulty urinating and flank pain.   Musculoskeletal: Negative for back pain, neck pain and neck stiffness.   Skin: Negative for color change and pallor.   Neurological: Negative for seizures, syncope and speech difficulty.   Psychiatric/Behavioral: Negative for confusion.   All other systems reviewed and are negative.      Past Medical History:   Diagnosis Date   • Arthritis    • Bladder stones    • BPH (benign prostatic hyperplasia)    • Brain aneurysm     1987   • Brain aneurysm 1987   • Cancer (CMS/[facility])     skin ca- felix   • Claustrophobia    • COPD (chronic obstructive pulmonary disease) (CMS/[facility])    • Coronary artery disease    • Diabetes (CMS/[facility])    • Difficulty urinating    • Fracture, finger    • GERD (gastroesophageal reflux disease)    • Hematuria    • High cholesterol    • History of sepsis    • Hypertension    • MI (myocardial infarction) (CMS/[facility])     2013   • Neuropathy      FEET AND LEGS   • Prostate disease    • Stroke (CMS/[facility])     X3 last one 9/2016   TIA   • TIA (transient ischemic attack)    • Unsteady gait    • UTI (urinary tract infection)        No Known Allergies    Past Surgical History:   Procedure Laterality Date   • CARDIAC CATHETERIZATION  03/03/2014   • CARDIAC SURGERY     • CARDIOVASCULAR STRESS TEST  04/04/2013   • CATARACT EXTRACTION     • CEREBRAL ANEURYSM REPAIR     • COLONOSCOPY     • CORONARY ANGIOPLASTY WITH STENT PLACEMENT     • CYSTOSCOPY LITHOLAPAXY BLADDER STONE EXTRACTION N/A 11/29/2017    Procedure: CYSTOSCOPY LITHOLAPAXY BLADDER STONE EXTRACTION;  Surgeon: Flavio Barrios MD;  Location: [facility];  Service:    • ECHO - CONVERTED  02/02/2014   • EYE SURGERY     • INNER EAR SURGERY     • SKIN BIOPSY     • TONSILLECTOMY     • URETHRAL DILATATION N/A 6/1/2020    Procedure: CYSTOSCOPY URETHRAL DILATATION;  Surgeon: Flavio Barrios MD;  Location: [facility];  Service: Urology;  Laterality: N/A;       Family History   Problem Relation Age of Onset   • Osteoporosis Mother    • Cancer Mother         breast and lung   • Cancer Father         small cell cancer, kidney   • Heart disease Father    • Diabetes Father    • No Known Problems Sister    • Aneurysm Brother        Social History     Socioeconomic History   • Marital status: Single     Spouse name: Not on file   • Number of children: Not on file   • Years of education: Not on file   • Highest education level: Not on file   Tobacco Use   • Smoking status: Current Every Day Smoker     Packs/day: 1.00     Years: 45.00     Pack years: 45.00     Types: Cigarettes   • Smokeless tobacco: Never Used   Substance and Sexual Activity   • Alcohol use: No   • Drug use: No   • Sexual activity: Defer     Birth control/protection: None           Objective   Physical Exam  Vitals signs and nursing note reviewed.   Constitutional:       General: He is not in acute distress.     Appearance: He is well-developed.  He is not ill-appearing, toxic-appearing or diaphoretic.   HENT:      Head: Normocephalic and atraumatic.   Eyes:      General: No scleral icterus.     Pupils: Pupils are equal, round, and reactive to light.   Neck:      Musculoskeletal: Normal range of motion and neck supple. No neck rigidity.      Trachea: No tracheal deviation.   Cardiovascular:      Rate and Rhythm: Normal rate and regular rhythm.   Pulmonary:      Effort: Pulmonary effort is normal. No respiratory distress.      Breath sounds: Normal breath sounds.   Chest:      Chest wall: No tenderness.   Abdominal:      General: Bowel sounds are normal. There is no distension.      Palpations: Abdomen is soft.      Tenderness: There is no abdominal tenderness. There is no guarding or rebound.   Musculoskeletal: Normal range of motion.         General: No tenderness.   Skin:     General: Skin is warm and dry.      Capillary Refill: Capillary refill takes less than 2 seconds.      Coloration: Skin is not pale.   Neurological:      General: No focal deficit present.      Mental Status: He is alert and oriented to person, place, and time.      GCS: GCS eye subscore is 4. GCS verbal subscore is 5. GCS motor subscore is 6.      Sensory: No sensory deficit.      Motor: No abnormal muscle tone.      Coordination: Coordination normal.   Psychiatric:         Mood and Affect: Mood normal.         Behavior: Behavior normal.         Procedures  EKG shows sinus rhythm with rate 74.  No apparent acute ischemia.  No significant change from 12/21/2019.  XR Chest 1 View   Final Result   Cardiomegaly. Otherwise no acute cardiopulmonary findings.       This report was finalized on 9/24/2020 7:25 PM by Dr. Jose White MD.          CT Abdomen Pelvis Without Contrast    (Results Pending)     Results for orders placed or performed during the hospital encounter of 09/24/20   Influenza Antigen, Rapid - Swab, Nasopharynx    Specimen: Nasopharynx; Swab   Result Value Ref Range     Influenza A Ag, EIA Negative Negative    Influenza B Ag, EIA Negative Negative   COVID-19, [facility]OTT IN-HOUSE,NP Swab (NO TRANSPORT MEDIA) 2 HR TAT - Swab, Nasopharynx    Specimen: Nasopharynx; Swab   Result Value Ref Range    COVID19 Not Detected Not Detected - Ref. Range   Comprehensive Metabolic Panel    Specimen: Blood   Result Value Ref Range    Glucose 156 (H) 65 - 99 mg/dL    BUN 5 (L) 8 - 23 mg/dL    Creatinine 0.93 0.76 - 1.27 mg/dL    Sodium 132 (L) 136 - 145 mmol/L    Potassium 4.2 3.5 - 5.2 mmol/L    Chloride 99 98 - 107 mmol/L    CO2 20.7 (L) 22.0 - 29.0 mmol/L    Calcium 9.1 8.6 - 10.5 mg/dL    Total Protein 7.7 6.0 - 8.5 g/dL    Albumin 3.92 3.50 - 5.20 g/dL    ALT (SGPT) 9 1 - 41 U/L    AST (SGOT) 15 1 - 40 U/L    Alkaline Phosphatase 116 39 - 117 U/L    Total Bilirubin 0.2 0.0 - 1.2 mg/dL    eGFR Non African Amer 82 >60 mL/min/1.73    Globulin 3.8 gm/dL    A/G Ratio 1.0 g/dL    BUN/Creatinine Ratio 5.4 (L) 7.0 - 25.0    Anion Gap 12.3 5.0 - 15.0 mmol/L   Lipase    Specimen: Blood   Result Value Ref Range    Lipase 13 13 - 60 U/L   Amylase    Specimen: Blood   Result Value Ref Range    Amylase 29 28 - 100 U/L   Urinalysis With Culture If Indicated - Urine, Clean Catch    Specimen: Urine, Clean Catch   Result Value Ref Range    Color, UA Yellow Yellow, Straw    Appearance, UA Cloudy (A) Clear    pH, UA 8.5 (H) 5.0 - 8.0    Specific Gravity, UA 1.012 1.005 - 1.030    Glucose, UA Negative Negative    Ketones, UA Negative Negative    Bilirubin, UA Negative Negative    Blood, UA Trace (A) Negative    Protein, UA Trace (A) Negative    Leuk Esterase, UA Large (3+) (A) Negative    Nitrite, UA Negative Negative    Urobilinogen, UA 0.2 E.U./dL 0.2 - 1.0 E.U./dL   BNP    Specimen: Blood   Result Value Ref Range    proBNP 761.0 0.0 - 900.0 pg/mL   Troponin    Specimen: Blood   Result Value Ref Range    Troponin T <0.010 0.000 - 0.030 ng/mL   Lactic Acid, Plasma    Specimen: Blood   Result Value Ref Range    Lactate  2.3 (C) 0.5 - 2.0 mmol/L   C-reactive Protein    Specimen: Blood   Result Value Ref Range    C-Reactive Protein 0.86 (H) 0.00 - 0.50 mg/dL   T4, Free    Specimen: Blood   Result Value Ref Range    Free T4 1.16 0.93 - 1.70 ng/dL   TSH    Specimen: Blood   Result Value Ref Range    TSH 2.850 0.270 - 4.200 uIU/mL   Magnesium    Specimen: Blood   Result Value Ref Range    Magnesium 1.9 1.6 - 2.4 mg/dL   CBC Auto Differential    Specimen: Blood   Result Value Ref Range    WBC 8.02 3.40 - 10.80 10*3/mm3    RBC 4.17 4.14 - 5.80 10*6/mm3    Hemoglobin 9.0 (L) 13.0 - 17.7 g/dL    Hematocrit 30.1 (L) 37.5 - 51.0 %    MCV 72.2 (L) 79.0 - 97.0 fL    MCH 21.6 (L) 26.6 - 33.0 pg    MCHC 29.9 (L) 31.5 - 35.7 g/dL    RDW 19.0 (H) 12.3 - 15.4 %    RDW-SD 48.8 37.0 - 54.0 fl    MPV 9.8 6.0 - 12.0 fL    Platelets 276 140 - 450 10*3/mm3    Neutrophil % 56.4 42.7 - 76.0 %    Lymphocyte % 29.7 19.6 - 45.3 %    Monocyte % 12.2 (H) 5.0 - 12.0 %    Eosinophil % 0.9 0.3 - 6.2 %    Basophil % 0.6 0.0 - 1.5 %    Immature Grans % 0.2 0.0 - 0.5 %    Neutrophils, Absolute 4.52 1.70 - 7.00 10*3/mm3    Lymphocytes, Absolute 2.38 0.70 - 3.10 10*3/mm3    Monocytes, Absolute 0.98 (H) 0.10 - 0.90 10*3/mm3    Eosinophils, Absolute 0.07 0.00 - 0.40 10*3/mm3    Basophils, Absolute 0.05 0.00 - 0.20 10*3/mm3    Immature Grans, Absolute 0.02 0.00 - 0.05 10*3/mm3    nRBC 0.0 0.0 - 0.2 /100 WBC   Scan Slide    Specimen: Blood   Result Value Ref Range    Anisocytosis Mod/2+ None Seen    Hypochromia Slight/1+ None Seen    Microcytes Mod/2+ None Seen    Platelet Morphology Normal Normal   Urinalysis, Microscopic Only - Urine, Clean Catch    Specimen: Urine, Clean Catch   Result Value Ref Range    RBC, UA 3-5 (A) None Seen, 0-2 /HPF    WBC, UA Too Numerous to Count (A) None Seen, 0-2 /HPF    Bacteria, UA None Seen None Seen /HPF    Squamous Epithelial Cells, UA None Seen None Seen, 0-2 /HPF    Hyaline Casts, UA None Seen None Seen /LPF    Methodology Automated  Microscopy    Light Blue Top   Result Value Ref Range    Extra Tube hold for add-on    Green Top (Gel)   Result Value Ref Range    Extra Tube Hold for add-ons.    Lavender Top   Result Value Ref Range    Extra Tube hold for add-on    Gold Top - SST   Result Value Ref Range    Extra Tube Hold for add-ons.                 ED Course  ED Course as of Sep 24 2059   Thu Sep 24, 2020   2010 Post void residual 529 per nursing.    [CM]   2056 Patient refuses his CT abdomen pelvis.  Informed him of his post void residual, he refuses a catheter, states that he never empties his bladder well.  He insists upon being discharged immediately, and wants to go home right away.  Not interested in finishing his work-up, not interested in being admitted to the hospital.  His wife is here, she agrees with the patient.  She wants to take him home.    [CM]      ED Course User Index  [CM] Darrell Seymour MD                                           [facility]    Final diagnoses:   UTI (urinary tract infection), bacterial             Please note that portions of this note were completed with a voice recognition program.        Darrell Seymour MD  09/24/20 2100

## 2020-09-25 ENCOUNTER — EPISODE CHANGES (OUTPATIENT)
Dept: CASE MANAGEMENT | Facility: OTHER | Age: 65
End: 2020-09-25

## 2020-09-25 ENCOUNTER — TELEPHONE (OUTPATIENT)
Dept: UROLOGY | Facility: CLINIC | Age: 65
End: 2020-09-25

## 2020-09-25 NOTE — ED NOTES
Radiology present at bedside. Pt refuses CT scan at this time. Provider made aware      Claudia Velazquez RN  09/24/20 2057

## 2020-09-25 NOTE — DISCHARGE INSTRUCTIONS
Home in care of wife.  Rest.  Take your medications as prescribed.  See Dr. Espinal in 1 to 2 days.  Return to the emergency department right away if symptoms worsen/any problems.

## 2020-09-25 NOTE — TELEPHONE ENCOUNTER
Pt went to ER yesterday and was given Cefdinir 300mg, pt wants to know if he should take this.       Requesting a call back at [phone]

## 2020-09-25 NOTE — ED NOTES
Post void Bladder scan shows 559mL provider made aware      Claudia Velazquez, RN  09/24/20 [id number]

## 2020-09-25 NOTE — TELEPHONE ENCOUNTER
I called the pt and told him that his urine culture came back with no growth and is abnormal UA was more than likely fromt he procedure Dr Barrios did and we would recommend that he discontinue the antibiotic.

## 2020-09-26 LAB — BACTERIA SPEC AEROBE CULT: NO GROWTH

## 2020-09-29 LAB
BACTERIA SPEC AEROBE CULT: NORMAL
BACTERIA SPEC AEROBE CULT: NORMAL

## 2020-10-16 DIAGNOSIS — R35.0 FREQUENCY OF MICTURITION: Primary | ICD-10-CM

## 2020-10-16 RX ORDER — NITROFURANTOIN 25; 75 MG/1; MG/1
100 CAPSULE ORAL 2 TIMES DAILY
Qty: 14 CAPSULE | Refills: 0 | Status: SHIPPED | OUTPATIENT
Start: 2020-10-16 | End: 2020-11-03

## 2020-10-20 ENCOUNTER — OFFICE VISIT (OUTPATIENT)
Dept: CARDIOLOGY | Facility: CLINIC | Age: 65
End: 2020-10-20

## 2020-10-20 VITALS
HEIGHT: 72 IN | TEMPERATURE: 97.7 F | OXYGEN SATURATION: 97 % | SYSTOLIC BLOOD PRESSURE: 156 MMHG | HEART RATE: 86 BPM | DIASTOLIC BLOOD PRESSURE: 68 MMHG | WEIGHT: 238 LBS | BODY MASS INDEX: 32.23 KG/M2

## 2020-10-20 DIAGNOSIS — E78.2 MIXED HYPERLIPIDEMIA: ICD-10-CM

## 2020-10-20 DIAGNOSIS — Z72.0 TOBACCO USE: ICD-10-CM

## 2020-10-20 DIAGNOSIS — I10 ESSENTIAL HYPERTENSION: ICD-10-CM

## 2020-10-20 DIAGNOSIS — I25.10 CORONARY ARTERY DISEASE INVOLVING NATIVE CORONARY ARTERY OF NATIVE HEART WITHOUT ANGINA PECTORIS: Primary | ICD-10-CM

## 2020-10-20 DIAGNOSIS — R35.0 FREQUENCY OF MICTURITION: ICD-10-CM

## 2020-10-20 PROCEDURE — 99204 OFFICE O/P NEW MOD 45 MIN: CPT | Performed by: INTERNAL MEDICINE

## 2020-10-20 PROCEDURE — 93000 ELECTROCARDIOGRAM COMPLETE: CPT | Performed by: INTERNAL MEDICINE

## 2020-10-20 RX ORDER — LOSARTAN POTASSIUM 50 MG/1
50 TABLET ORAL DAILY
Qty: 90 TABLET | Refills: 1 | Status: SHIPPED | OUTPATIENT
Start: 2020-10-20 | End: 2021-08-13

## 2020-10-20 RX ORDER — NITROGLYCERIN 0.4 MG/1
TABLET SUBLINGUAL
Qty: 30 TABLET | Refills: 1 | Status: SHIPPED | OUTPATIENT
Start: 2020-10-20 | End: 2021-04-13 | Stop reason: DRUGHIGH

## 2020-10-20 RX ORDER — TRAMADOL HYDROCHLORIDE 50 MG/1
100 TABLET ORAL NIGHTLY
COMMUNITY
Start: 2020-10-15

## 2020-10-20 RX ORDER — METOPROLOL SUCCINATE 25 MG/1
12.5 TABLET, EXTENDED RELEASE ORAL DAILY
Qty: 45 TABLET | Refills: 1 | Status: SHIPPED | OUTPATIENT
Start: 2020-10-20 | End: 2021-04-13 | Stop reason: DRUGHIGH

## 2020-10-20 RX ORDER — ROSUVASTATIN CALCIUM 5 MG/1
5 TABLET, COATED ORAL DAILY
Qty: 90 TABLET | Refills: 1 | Status: SHIPPED | OUTPATIENT
Start: 2020-10-20 | End: 2021-04-15

## 2020-10-20 NOTE — TELEPHONE ENCOUNTER
The pt does need to continue on the medication, as soon as Sabine Asaf MA is able to finish her time in the pts chart, I will send the med over and notifiy the pt.

## 2020-10-20 NOTE — TELEPHONE ENCOUNTER
Pt states that he is out of Ditropan wants to know if he should continue to take it, and if so he needs a refill called into [facility]      Pts [phone]

## 2020-10-20 NOTE — PROGRESS NOTES
Subjective   Chief Complaint   Patient presents with   • Establish Care     Re-Establish former patient   • Chest Pain     burning pain, occasional   • Shortness of Breath   • Coronary Artery Disease       History of Present Illness  Patient is 64 years old white male who has known coronary artery disease he was last seen by me more than 3 years ago.  He has known coronary artery disease with prior inferior wall myocardial infarction in 2013 followed by multiple stents.  He denies any chest pain or palpitations.  However on direct questioning he does feel that she has some tight feeling in the chest off and on which is not related to exertion.  Patient has hyperlipidemia but is not taking any statin therapy.  His blood pressure is also significantly elevated.  He is not on any beta-blocker therapy or statin therapy as mentioned.  He is also diabetic and has had TIA in the past    Past Surgical History:   Procedure Laterality Date   • CARDIAC CATHETERIZATION  03/03/2014   • CARDIAC SURGERY     • CARDIOVASCULAR STRESS TEST  04/04/2013   • CATARACT EXTRACTION     • CEREBRAL ANEURYSM REPAIR     • COLONOSCOPY     • CORONARY ANGIOPLASTY WITH STENT PLACEMENT     • CYSTOSCOPY LITHOLAPAXY BLADDER STONE EXTRACTION N/A 11/29/2017    Procedure: CYSTOSCOPY LITHOLAPAXY BLADDER STONE EXTRACTION;  Surgeon: Flavio Barrios MD;  Location: [facility];  Service:    • ECHO - CONVERTED  02/02/2014   • EYE SURGERY     • INNER EAR SURGERY     • SKIN BIOPSY     • TONSILLECTOMY     • URETHRAL DILATATION N/A 6/1/2020    Procedure: CYSTOSCOPY URETHRAL DILATATION;  Surgeon: Flavio Barrios MD;  Location: [facility];  Service: Urology;  Laterality: N/A;     Family History   Problem Relation Age of Onset   • Osteoporosis Mother    • Cancer Mother         breast and lung   • Cancer Father         small cell cancer, kidney   • Heart disease Father    • Diabetes Father    • No Known Problems Sister    • Aneurysm Brother      Past  Medical History:   Diagnosis Date   • Arthritis    • Bladder stones    • BPH (benign prostatic hyperplasia)    • Brain aneurysm     1987   • Brain aneurysm 1987   • Cancer (CMS/Formerly [facility])     skin ca- felix   • Claustrophobia    • COPD (chronic obstructive pulmonary disease) (CMS/Formerly [facility])    • Coronary artery disease    • Diabetes (CMS/Formerly [facility])    • Difficulty urinating    • Fracture, finger    • GERD (gastroesophageal reflux disease)    • Hematuria    • High cholesterol    • History of sepsis    • Hypertension    • MI (myocardial infarction) (CMS/Formerly [facility])     2013   • Neuropathy     FEET AND LEGS   • Prostate disease    • Stroke (CMS/Formerly [facility])     X3 last one 9/2016   TIA   • TIA (transient ischemic attack)    • Unsteady gait    • UTI (urinary tract infection)        Patient Active Problem List   Diagnosis   • Left wrist pain   • Diabetes (CMS/[facility]ly [facility])   • Prostate disease   • COPD (chronic obstructive pulmonary disease) (CMS/Formerly [facility])   • History of TIA (transient ischemic attack)   • Ganglion cyst of wrist   • Frequency of micturition   • Claustrophobia   • Blood in the urine   • Incomplete bladder emptying   • Coronary artery disease, inferior wall myocardial infarction 2013, bare metal stent to RCA, drug-eluting stent ×2 to LAD and balloon angioplasty of the medial trunk of the LAD diagonal 2014   • Hypertension   • Hyperlipidemia   • Tobacco use   • Gross hematuria   • Urinary retention   • Bladder stones   • Surgical aftercare, genitourinary system   • Cutaneous abscess of neck   • Acute cystitis without hematuria   • Dysuria   • Urethral stricture due to infection   • Urinary incontinence without sensory awareness   • Urinary tract infection without hematuria   • Chronic cystitis         Social History     Tobacco Use   • Smoking status: Current Every Day Smoker     Packs/day: 1.00     Years: 45.00     Pack years: 45.00     Types: Cigarettes   • Smokeless tobacco: Never Used   Substance Use Topics   • Alcohol use: No   • Drug use: No          The following portions of the patient's history were reviewed and updated as appropriate: allergies, current medications, past family history, past medical history, past social history, past surgical history and problem list.    No Known Allergies      Current Outpatient Medications:   •  aspirin  MG tablet, Take 325 mg by mouth Every 6 (Six) Hours As Needed., Disp: , Rfl:   •  Blood Glucose Monitoring Suppl (ACCU-CHEK JOSE G) device, Use as directed to test blood sugar three times daily and and bedtime., Disp: 1 each, Rfl: 0  •  clopidogrel (PLAVIX) 75 MG tablet, , Disp: , Rfl:   •  diclofenac (VOLTAREN) 75 MG EC tablet, Take 1 tablet by mouth 2 (Two) Times a Day., Disp: 30 tablet, Rfl: 0  •  doxazosin (CARDURA) 8 MG tablet, Take 8 mg by mouth Every Morning., Disp: , Rfl:   •  finasteride (PROSCAR) 5 MG tablet, Take 1 tablet by mouth Daily., Disp: 30 tablet, Rfl: 5  •  furosemide (LASIX) 20 MG tablet, Take 20 mg by mouth Daily As Needed., Disp: , Rfl:   •  gabapentin (NEURONTIN) 600 MG tablet, 600 mg 3 (Three) Times a Day., Disp: , Rfl:   •  glimepiride (AMARYL) 4 MG tablet, Take 4 mg by mouth Every Morning Before Breakfast., Disp: , Rfl:   •  glucose blood test strip, Use as directed to test blood sugar three times daily and and bedtime., Disp: 120 each, Rfl: 0  •  metFORMIN (GLUCOPHAGE) 500 MG tablet, Take 500 mg by mouth 2 (Two) Times a Day With Meals., Disp: , Rfl:   •  nitrofurantoin, macrocrystal-monohydrate, (Macrobid) 100 MG capsule, Take 1 capsule by mouth 2 (Two) Times a Day., Disp: 14 capsule, Rfl: 0  •  ondansetron ODT (ZOFRAN-ODT) 4 MG disintegrating tablet, Place 1 tablet on the tongue Every 6 (Six) Hours As Needed for Nausea or Vomiting., Disp: 20 tablet, Rfl: 0  •  oxyCODONE-acetaminophen (Percocet)  MG per tablet, Take 1 tablet by mouth Every 4 (Four) Hours As Needed for Moderate Pain, Disp: 12 tablet, Rfl: 0  •  pantoprazole (PROTONIX) 40 MG EC tablet, Take 40 mg by mouth  daily., Disp: , Rfl:   •  pioglitazone (ACTOS) 15 MG tablet, , Disp: , Rfl:   •  polyethylene glycol (MIRALAX) pack packet, Mix 17 g with liquid of choice and drink  Daily., Disp: 500 g, Rfl: 0  •  tamsulosin (FLOMAX) 0.4 MG capsule 24 hr capsule, Take 1 capsule by mouth Every Night., Disp: 90 capsule, Rfl: 4  •  traMADol (ULTRAM) 50 MG tablet, , Disp: , Rfl:   •  TRUEPLUS LANCETS 33G misc, Use as directed to test blood sugar three times daily and and bedtime., Disp: 120 each, Rfl: 0  •  zolpidem (AMBIEN) 10 MG tablet, Take 10 mg by mouth at night as needed., Disp: , Rfl:   •  insulin detemir (LEVEMIR) 100 UNIT/ML injection, Inject 15 Units under the skin Daily., Disp: 15 mL, Rfl: 0  •  Insulin Pen Needle 29G X 12.7MM misc, Use as directed with insulin three times daily with meals and at bedtime., Disp: 120 each, Rfl: 0  •  losartan (COZAAR) 50 MG tablet, Take 1 tablet by mouth Daily., Disp: 90 tablet, Rfl: 1  •  metoprolol succinate XL (TOPROL-XL) 25 MG 24 hr tablet, Take 0.5 tablets by mouth Daily., Disp: 45 tablet, Rfl: 1  •  nitroglycerin (NITROSTAT) 0.4 MG SL tablet, 1 under the tongue as needed for angina, may repeat q5mins for up three doses, Disp: 30 tablet, Rfl: 1  •  oxybutynin (DITROPAN) 5 MG tablet, Take 1 tablet by mouth 2 (Two) Times a Day., Disp: 60 tablet, Rfl: 3  •  rosuvastatin (CRESTOR) 5 MG tablet, Take 1 tablet by mouth Daily., Disp: 90 tablet, Rfl: 1    Review of Systems   Constitution: Negative.   HENT: Negative.  Negative for congestion.    Eyes: Negative.    Cardiovascular: Positive for chest pain. Negative for cyanosis, dyspnea on exertion, irregular heartbeat, leg swelling, near-syncope, orthopnea, palpitations, paroxysmal nocturnal dyspnea and syncope.   Respiratory: Positive for shortness of breath.    Endocrine: Negative.    Hematologic/Lymphatic: Negative.    Skin: Negative.    Musculoskeletal: Negative.    Gastrointestinal: Negative.    Genitourinary: Negative.    Neurological:  "Negative.  Negative for headaches.        Objective      /68 (BP Location: Left arm, Patient Position: Sitting, Cuff Size: Adult)   Pulse 86   Temp 97.7 °F (36.5 °C)   Ht 182.9 cm (72\")   Wt 108 kg (238 lb)   SpO2 97%   BMI 32.28 kg/m²     Vitals signs and nursing note reviewed.   Constitutional:       General: Not in acute distress.     Appearance: Healthy appearance. Well-developed and not in distress. Not diaphoretic.   Eyes:      General: No scleral icterus.     Conjunctiva/sclera: Conjunctivae normal.      Pupils: Pupils are equal, round, and reactive to light.   HENT:      Head: Normocephalic and atraumatic.   Neck:      Musculoskeletal: Normal range of motion and neck supple.      Thyroid: Thyroid normal. No thyromegaly.      Vascular: No JVD. JVD normal.      Trachea: No tracheal deviation.      Lymphadenopathy: No cervical adenopathy.   Pulmonary:      Effort: Pulmonary effort is normal. No respiratory distress.      Breath sounds: Normal breath sounds and air entry.   Chest:      Chest wall: Not tender to palpatation.   Cardiovascular:      PMI at left midclavicular line. Normal rate. Regular rhythm.      No gallop.   Pulses:     Intact distal pulses. No decreased pulses.   Abdominal:      General: Bowel sounds are normal. There is no distension or abdominal bruit.      Palpations: Abdomen is soft. There is no hepatomegaly, splenomegaly or abdominal mass.      Tenderness: There is no abdominal tenderness. There is no guarding or rebound.   Skin:     General: Skin is warm and dry. There is no cyanosis.      Coloration: Skin is not jaundiced or pale.      Findings: No erythema or rash.   Neurological:      Mental Status: Alert, oriented to person, place, and time and oriented to person, place and time.   Psychiatric:         Attention and Perception: Attention normal.         Mood and Affect: Mood and affect normal.         Speech: Speech normal.         Behavior: Behavior is cooperative. "         Lab Review:    Lab Results   Component Value Date     (L) 09/24/2020    K 4.2 09/24/2020    CL 99 09/24/2020    BUN 5 (L) 09/24/2020    CREATININE 0.93 09/24/2020     (H) 02/06/2020    GLUCOSE 156 (H) 09/24/2020    CALCIUM 9.1 09/24/2020    ALT 9 09/24/2020    ALKPHOS 116 09/24/2020     Lab Results   Component Value Date    CKTOTAL 91 11/13/2017     Lab Results   Component Value Date    WBC 8.02 09/24/2020    HGB 9.0 (L) 09/24/2020    HCT 30.1 (L) 09/24/2020     09/24/2020     Lab Results   Component Value Date    INR 1.01 12/21/2019    INR 1.00 04/20/2018    INR 1.02 11/13/2017     Lab Results   Component Value Date    MG 1.9 09/24/2020     Lab Results   Component Value Date    PSA 1.470 02/06/2020    CHLPL 101 03/26/2014    TRIG 70 03/26/2014    HDL 38 (L) 03/26/2014     Lab Results   Component Value Date    BNP 27.0 09/04/2018       Procedures    I reviewed the patient's new clinical results.  I personally viewed and interpreted the patient's EKG/lab data        Assessment:   Diagnosis Plan   1. Coronary artery disease involving native coronary artery of native heart without angina pectoris  ECG 12 Lead   2. Mixed hyperlipidemia     3. Essential hypertension     4. Tobacco use            Plan:  Patient has known coronary artery disease with prior myocardial infarction and multiple stents.  Unfortunately he is not taking any beta-blocker therapy or statin therapy.  Blood pressure is elevated, patient was started on losartan 50 mg daily.  Beta-blocker therapy with Toprol-XL was initiated also.  He was started on Crestor.  Stress test and echocardiogram was discussed and recommended.  Patient wants to wait till next visit.  Cessation of tobacco use was also stressed.  If patient agrees next visit will arrange further cardiac work-up.    Patient is diabetic medications are are incorrect in my note.  Dr. Espinal's note was  reviewed and list of medications will be corrected.  Patient does  not seem to know his medications very well.    Thank you for giving me the oppertunity to participate in your patient's cardiac care.    Sincerely,    MARIA E Tejada M.D. [facility]     No follow-ups on file.

## 2020-10-21 RX ORDER — OXYBUTYNIN CHLORIDE 5 MG/1
5 TABLET ORAL 2 TIMES DAILY
Qty: 60 TABLET | Refills: 3 | Status: SHIPPED | OUTPATIENT
Start: 2020-10-21 | End: 2021-02-09

## 2020-10-22 ENCOUNTER — EPISODE CHANGES (OUTPATIENT)
Dept: CASE MANAGEMENT | Facility: OTHER | Age: 65
End: 2020-10-22

## 2020-10-26 ENCOUNTER — TELEPHONE (OUTPATIENT)
Dept: UROLOGY | Facility: CLINIC | Age: 65
End: 2020-10-26

## 2020-11-03 ENCOUNTER — OFFICE VISIT (OUTPATIENT)
Dept: UROLOGY | Facility: CLINIC | Age: 65
End: 2020-11-03

## 2020-11-03 VITALS — HEIGHT: 72 IN | WEIGHT: 245.4 LBS | BODY MASS INDEX: 33.24 KG/M2 | TEMPERATURE: 99 F

## 2020-11-03 DIAGNOSIS — N39.0 RECURRENT UTI: Primary | ICD-10-CM

## 2020-11-03 DIAGNOSIS — R35.0 BENIGN PROSTATIC HYPERPLASIA WITH URINARY FREQUENCY: ICD-10-CM

## 2020-11-03 DIAGNOSIS — N40.1 BENIGN PROSTATIC HYPERPLASIA WITH URINARY FREQUENCY: ICD-10-CM

## 2020-11-03 PROCEDURE — 81003 URINALYSIS AUTO W/O SCOPE: CPT | Performed by: NURSE PRACTITIONER

## 2020-11-03 PROCEDURE — 99214 OFFICE O/P EST MOD 30 MIN: CPT | Performed by: NURSE PRACTITIONER

## 2020-11-03 PROCEDURE — 51798 US URINE CAPACITY MEASURE: CPT | Performed by: NURSE PRACTITIONER

## 2020-11-03 RX ORDER — NITROFURANTOIN 25; 75 MG/1; MG/1
100 CAPSULE ORAL 2 TIMES DAILY
Qty: 14 CAPSULE | Refills: 0 | Status: CANCELLED | OUTPATIENT
Start: 2020-11-03

## 2020-11-03 RX ORDER — NITROFURANTOIN 25; 75 MG/1; MG/1
100 CAPSULE ORAL DAILY
Qty: 56 CAPSULE | Refills: 5 | Status: SHIPPED | OUTPATIENT
Start: 2020-11-03 | End: 2021-04-13

## 2020-11-03 RX ORDER — ONDANSETRON HYDROCHLORIDE 8 MG/1
4 TABLET, FILM COATED ORAL EVERY 12 HOURS PRN
Qty: 20 TABLET | Refills: 1 | Status: CANCELLED | OUTPATIENT
Start: 2020-11-03

## 2020-11-03 NOTE — PROGRESS NOTES
Chief Complaint:          Chief Complaint   Patient presents with   • Follow-up     POSSIBLE KIDNEY INFECTION   • Flank Pain       HPI:   65 y.o. male, with a significant history of chronic Cystitis and recurrent UTIs from uncontrolled blood sugars, BPH with urine retention Returns to clinic today for evaluation.    He has been doing relatively well for over 5 months. States he ran out of his medications -Macrobid for suppressive therapy prior to being symptomatic. He reports urinary symptoms of frequency, urgency and dysuria. He reports Nocturia 2-3x and urinary incontinent episodes for which he now wears depends daily. He has burning with urination at times,but denies any episodes of gross hematuria.     He also reports lower back pain following a fall 4 days ago where reports his feet gave out from his neuropathy, and he landed on his butt, hurting his lower back. He states his pain is severe 8/10, not controlled by his current regiment with gabapentin and tramadol.  He is desirous of stronger/better pain control. Suggested he get some X-ray/CT, Patient reports he has follow up with primary care today and will go from there.    His urine dipstick show 3 + leukocyte Estrace, 1+ microscopic hematuria. His IPSS score is His PVR is 65CC. Overall he reports doing relatively well and would like refills on his medications.      Past Medical History:        Past Medical History:   Diagnosis Date   • Arthritis    • Bladder stones    • BPH (benign prostatic hyperplasia)    • Brain aneurysm     1987   • Brain aneurysm 1987   • Cancer (CMS/[facility])     skin ca- felix   • Claustrophobia    • COPD (chronic obstructive pulmonary disease) (CMS/[facility])    • Coronary artery disease    • Diabetes (CMS/[facility])    • Difficulty urinating    • Fracture, finger    • GERD (gastroesophageal reflux disease)    • Hematuria    • High cholesterol    • History of sepsis    • Hypertension    • MI (myocardial infarction) (CMS/[facility])     2013   • Neuropathy      FEET AND LEGS   • Prostate disease    • Stroke (CMS/[facility])     X3 last one 9/2016   TIA   • TIA (transient ischemic attack)    • Unsteady gait    • UTI (urinary tract infection)        The following portions of the patient's history were reviewed and updated as appropriate: allergies, current medications, past family history, past medical history, past social history, past surgical history and problem list.    Current Meds:     Current Outpatient Medications   Medication Sig Dispense Refill   • Alcohol Swabs (B-D SINGLE USE SWABS REGULAR) pads      • aspirin  MG tablet Take 325 mg by mouth Every 6 (Six) Hours As Needed.     • Blood Glucose Monitoring Suppl (ACCU-CHEK JOSE G) device Use as directed to test blood sugar three times daily and and bedtime. 1 each 0   • clopidogrel (PLAVIX) 75 MG tablet      • diclofenac (VOLTAREN) 75 MG EC tablet Take 1 tablet by mouth 2 (Two) Times a Day. 30 tablet 0   • doxazosin (CARDURA) 8 MG tablet Take 8 mg by mouth Every Morning.     • finasteride (PROSCAR) 5 MG tablet Take 1 tablet by mouth Daily. 30 tablet 5   • furosemide (LASIX) 20 MG tablet Take 20 mg by mouth Daily As Needed.     • gabapentin (NEURONTIN) 600 MG tablet 600 mg 3 (Three) Times a Day.     • glimepiride (AMARYL) 4 MG tablet Take 4 mg by mouth Every Morning Before Breakfast.     • glucose blood test strip Use as directed to test blood sugar three times daily and and bedtime. 120 each 0   • losartan (COZAAR) 50 MG tablet Take 1 tablet by mouth Daily. 90 tablet 1   • metFORMIN (GLUCOPHAGE) 500 MG tablet Take 500 mg by mouth 2 (Two) Times a Day With Meals.     • metoprolol succinate XL (TOPROL-XL) 25 MG 24 hr tablet Take 0.5 tablets by mouth Daily. 45 tablet 1   • nitroglycerin (NITROSTAT) 0.4 MG SL tablet 1 under the tongue as needed for angina, may repeat q5mins for up three doses 30 tablet 1   • ondansetron ODT (ZOFRAN-ODT) 4 MG disintegrating tablet Place 1 tablet on the tongue Every 6 (Six) Hours As Needed  for Nausea or Vomiting. 20 tablet 0   • oxybutynin (DITROPAN) 5 MG tablet Take 1 tablet by mouth 2 (Two) Times a Day. 60 tablet 3   • pantoprazole (PROTONIX) 40 MG EC tablet Take 40 mg by mouth daily.     • rosuvastatin (CRESTOR) 5 MG tablet Take 1 tablet by mouth Daily. 90 tablet 1   • tamsulosin (FLOMAX) 0.4 MG capsule 24 hr capsule Take 1 capsule by mouth Every Night. 90 capsule 4   • traMADol (ULTRAM) 50 MG tablet      • TRUEPLUS LANCETS 33G misc Use as directed to test blood sugar three times daily and and bedtime. 120 each 0   • zolpidem (AMBIEN) 10 MG tablet Take 10 mg by mouth at night as needed.     • insulin detemir (LEVEMIR) 100 UNIT/ML injection Inject 15 Units under the skin Daily. 15 mL 0   • Insulin Pen Needle 29G X 12.7MM misc Use as directed with insulin three times daily with meals and at bedtime. 120 each 0   • nitrofurantoin, macrocrystal-monohydrate, (Macrobid) 100 MG capsule Take 1 capsule by mouth Daily. 56 capsule 5   • ondansetron (ZOFRAN) 8 MG tablet      • oxyCODONE-acetaminophen (Percocet)  MG per tablet Take 1 tablet by mouth Every 4 (Four) Hours As Needed for Moderate Pain 12 tablet 0   • pioglitazone (ACTOS) 15 MG tablet      • polyethylene glycol (MIRALAX) pack packet Mix 17 g with liquid of choice and drink   Daily. 500 g 0     No current facility-administered medications for this visit.         Allergies:      No Known Allergies     Past Surgical History:     Past Surgical History:   Procedure Laterality Date   • CARDIAC CATHETERIZATION  03/03/2014   • CARDIAC SURGERY     • CARDIOVASCULAR STRESS TEST  04/04/2013   • CATARACT EXTRACTION     • CEREBRAL ANEURYSM REPAIR     • COLONOSCOPY     • CORONARY ANGIOPLASTY WITH STENT PLACEMENT     • CYSTOSCOPY LITHOLAPAXY BLADDER STONE EXTRACTION N/A 11/29/2017    Procedure: CYSTOSCOPY LITHOLAPAXY BLADDER STONE EXTRACTION;  Surgeon: Flavio Barrios MD;  Location: [facility];  Service:    • ECHO - CONVERTED  02/02/2014   • EYE SURGERY      • INNER EAR SURGERY     • SKIN BIOPSY     • TONSILLECTOMY     • URETHRAL DILATATION N/A 6/1/2020    Procedure: CYSTOSCOPY URETHRAL DILATATION;  Surgeon: Flavio Barrios MD;  Location: [facility];  Service: Urology;  Laterality: N/A;         Social History:     Social History     Socioeconomic History   • Marital status: Single     Spouse name: Not on file   • Number of children: Not on file   • Years of education: Not on file   • Highest education level: Not on file   Tobacco Use   • Smoking status: Current Every Day Smoker     Packs/day: 1.00     Years: 45.00     Pack years: 45.00     Types: Cigarettes   • Smokeless tobacco: Never Used   Substance and Sexual Activity   • Alcohol use: No   • Drug use: No   • Sexual activity: Defer     Birth control/protection: None       Family History:     Family History   Problem Relation Age of Onset   • Osteoporosis Mother    • Cancer Mother         breast and lung   • Cancer Father         small cell cancer, kidney   • Heart disease Father    • Diabetes Father    • No Known Problems Sister    • Aneurysm Brother        Review of Systems:    Review of Systems   Constitutional: Positive for activity change, appetite change and fatigue. Negative for chills and fever.   HENT: Negative for congestion and sinus pressure.    Eyes: Negative for blurred vision and double vision.   Respiratory: Negative for shortness of breath and wheezing.    Gastrointestinal: Positive for nausea. Negative for abdominal pain, constipation, diarrhea and vomiting.   Genitourinary: Positive for dysuria, flank pain, frequency, nocturia, urgency and urinary incontinence. Negative for difficulty urinating, discharge, genital sores, hematuria, penile pain, penile swelling, scrotal swelling and testicular pain.   Musculoskeletal: Positive for back pain, gait problem and myalgias. Neck symptoms: unsteady gait, uses a cane.   Skin: Positive for dry skin and pallor.   Neurological: Positive for weakness.  Negative for dizziness and confusion.   Psychiatric/Behavioral: Positive for sleep disturbance and stress. Negative for agitation, behavioral problems and decreased concentration.      IPSS Questionnaire (AUA-7):  Over the past month…    1)  How often have you had a sensation of not emptying your bladder completely after you finish urinating?  3 - About half the time   2)  How often have you had to urinate again less than two hours after you finished urinating? 4 - More than half the time   3)  How often have you found you stopped and started again several times when you urinated?  2 - Less than half the time   4) How difficult have you found it to postpone urination?  2 - Less than half the time   5) How often have you had a weak urinary stream?  2 - Less than half the time   6) How often have you had to push or strain to begin urination?  2 - Less than half the time   7) How many times did you most typically get up to urinate from the time you went to bed until the time you got up in the morning?  3 - 3 times   Total score:  0-7 mildly symptomatic    8-19 moderately symptomatic                                               18    20-35 severely symptomatic       Physical Exam:     Physical Exam  Constitutional:       General: He is not in acute distress.     Appearance: He is well-developed. He is ill-appearing.   HENT:      Head: Normocephalic and atraumatic.      Right Ear: External ear normal.      Left Ear: External ear normal.   Eyes:      General:         Right eye: No discharge.         Left eye: No discharge.      Conjunctiva/sclera: Conjunctivae normal.      Pupils: Pupils are equal, round, and reactive to light.   Neck:      Musculoskeletal: Normal range of motion and neck supple.      Thyroid: No thyromegaly.      Trachea: No tracheal deviation.   Cardiovascular:      Rate and Rhythm: Normal rate and regular rhythm.      Heart sounds: No murmur. No friction rub.   Pulmonary:      Effort: Pulmonary effort  is normal. No respiratory distress.      Breath sounds: Normal breath sounds. No stridor.   Abdominal:      General: Bowel sounds are normal. There is no distension.      Palpations: Abdomen is soft.      Tenderness: There is abdominal tenderness. There is no guarding.   Genitourinary:     Penis: Normal and uncircumcised. No tenderness or discharge.       Scrotum/Testes: Normal.      Rectum: Normal. Guaiac result negative.   Musculoskeletal: Normal range of motion.         General: Tenderness present. No deformity.   Skin:     General: Skin is warm and dry.      Capillary Refill: Capillary refill takes less than 2 seconds.      Coloration: Skin is pale.   Neurological:      Mental Status: He is alert and oriented to person, place, and time.      Cranial Nerves: No cranial nerve deficit.      Motor: Weakness present.      Coordination: Coordination normal.   Psychiatric:         Behavior: Behavior normal.         Thought Content: Thought content normal.         Judgment: Judgment normal.         Procedure:     No notes on file      Assessment:     Encounter Diagnoses   Name Primary?   • Benign prostatic hyperplasia with urinary frequency    • Recurrent UTI Yes     Orders Placed This Encounter   Procedures   • Urine Culture - Urine, Urine, Clean Catch   • Bladder Scan   • POC Urinalysis Dipstick, Automated       Plan:        ASSESSMENT  Acute Cystitis/Recurrent UTI: Recurrent urinary tract infections/Atrophic Vaginitis : Very pleasant , but ill looking and frail 65 year old male evaluated in clinic today for E. coli Recurrent UTI's. He reports he ran out of his Medications for suppressive therapy. Now presents with urinary symptoms. His urine dipstick show 3 + leukocyte Estrace, 1+ microscopic hematuria. His IPSS score is His PVR is 65CC. .    Patient reports doing relatively better on his antibiotic prophylaxis, and would like to continue. We discussed the risk factors for recurrent infections being intercourse in  younger patients and atrophic changes in older patients.  We discussed the symptoms that are found including pain,Bladder pain, pelvic pressure, burning, frequency, urgency suprapubic pain and dysuria. I discussed upper tract symptoms including fevers, chills, and indicated the workup would be much more aggressive if the patient were to present with recurrent infections in the face of upper tract symptomatology such as fever.       PLAN    Will send his urine for culture, I will call him with results if bacteria growth is not sensitive to current therapy.    We discussed he continue he antibiotic prophylaxis  With Macrobid 100 mg daily as directed.  He should increase his p.o. fluid intake to at least 1 to 2 L daily and avoid bladder irritants such as caffeine products, spicy foods, and citrusy foods. Also continue Better diabetic blood sugar control.      I recommend concomitant probiotics with treatment with antibiotics to protect the rectal reservoir including over-the-counter yogurt preparations to marcy oral pills containing the appropriate probiotics. Patient reports the diligent use of Probiotics.    Zofran 4 MG PRN Nausea    Will see him back in SIX weeks for Follow up in clinic and recheck her urinalysis at that time.    Patient is agreeable to plan of care.    Patient's Body mass index is 33.28 kg/m². BMI is above normal parameters. Recommendations include: educational material, exercise counseling and nutrition counseling.    Smoking Cessation Counseling:  Current every day smoker. less than 3 minutes spent counseling. Has reduced tobacco use.  I advised patient to quit tobacco use and offered support.  I provided patient with tobacco cessation educational material printed in the patient's After Visit Summary.     Counseling was given to patient for the following topics diagnostic results including: Chronic cystitis, UTI, BPH, instructions for management as follows: increase po fluids, Macrobid 100mg  daily, Zofran PRN, warm compresses to flank area, NSAIDs/Tylenol for pain,, risk factor reductions including: smoking cessation, Bladder irritants such as caffiene, citrusy/spicy foods and impressions as follows: Follow up 6 weeks.. The interim medical history and current results were reviewed.  A treatment plan with follow-up was made for Recurrent UTI [N39.0].            This document has been electronically signed by Griselda Cheng-Akwa, APRN November 3, 2020 15:27 EST

## 2020-11-04 ENCOUNTER — LAB (OUTPATIENT)
Dept: UROLOGY | Facility: CLINIC | Age: 65
End: 2020-11-04

## 2020-11-04 ENCOUNTER — TRANSCRIBE ORDERS (OUTPATIENT)
Dept: ADMINISTRATIVE | Facility: HOSPITAL | Age: 65
End: 2020-11-04

## 2020-11-04 ENCOUNTER — TELEPHONE (OUTPATIENT)
Dept: UROLOGY | Facility: CLINIC | Age: 65
End: 2020-11-04

## 2020-11-04 ENCOUNTER — HOSPITAL ENCOUNTER (OUTPATIENT)
Dept: GENERAL RADIOLOGY | Facility: HOSPITAL | Age: 65
Discharge: HOME | End: 2020-11-04

## 2020-11-04 DIAGNOSIS — N39.0 RECURRENT UTI: Primary | ICD-10-CM

## 2020-11-04 DIAGNOSIS — M53.3 COCCYX PAIN: ICD-10-CM

## 2020-11-04 DIAGNOSIS — M54.50 LOW BACK PAIN, UNSPECIFIED BACK PAIN LATERALITY, UNSPECIFIED CHRONICITY, UNSPECIFIED WHETHER SCIATICA PRESENT: Primary | ICD-10-CM

## 2020-11-04 DIAGNOSIS — M54.50 LOW BACK PAIN, UNSPECIFIED BACK PAIN LATERALITY, UNSPECIFIED CHRONICITY, UNSPECIFIED WHETHER SCIATICA PRESENT: ICD-10-CM

## 2020-11-04 LAB
BILIRUB BLD-MCNC: NEGATIVE MG/DL
CLARITY, POC: ABNORMAL
COLOR UR: YELLOW
GLUCOSE UR STRIP-MCNC: NEGATIVE MG/DL
KETONES UR QL: NEGATIVE
LEUKOCYTE EST, POC: ABNORMAL
NITRITE UR-MCNC: NEGATIVE MG/ML
PH UR: 6.5 [PH] (ref 5–8)
PROT UR STRIP-MCNC: ABNORMAL MG/DL
RBC # UR STRIP: NEGATIVE /UL
SP GR UR: 1.01 (ref 1–1.03)
UROBILINOGEN UR QL: NORMAL

## 2020-11-04 PROCEDURE — 72110 X-RAY EXAM L-2 SPINE 4/>VWS: CPT | Performed by: RADIOLOGY

## 2020-11-04 PROCEDURE — 72220 X-RAY EXAM SACRUM TAILBONE: CPT | Performed by: RADIOLOGY

## 2020-11-04 PROCEDURE — 72220 X-RAY EXAM SACRUM TAILBONE: CPT

## 2020-11-04 PROCEDURE — 72110 X-RAY EXAM L-2 SPINE 4/>VWS: CPT

## 2020-11-04 NOTE — TELEPHONE ENCOUNTER
The lab called and said that the pt's urine did not make it to the hospital and so I called the pt and asked him to please drop us off a urine sample and we would recollect it.

## 2020-11-06 LAB
BACTERIA UR CULT: NO GROWTH
BACTERIA UR CULT: NORMAL

## 2020-11-10 ENCOUNTER — TELEPHONE (OUTPATIENT)
Dept: UROLOGY | Facility: CLINIC | Age: 65
End: 2020-11-10

## 2020-11-10 NOTE — TELEPHONE ENCOUNTER
CALLED PATIENT TO CHECK ON HIM SINCE LAST CLINIC VISIT, AND ALSO YO  DISCUSS URINE CULTURE RESULTS AND FISH STUDY CAME BACK NEGATIVE AT THIS TIME.     PATIENT WILL CONTINUE HIS ABX SUPPRESSIVE THERAPY AS DISCUSSED. INCREASING PO FLUIDS AND AVOIDING BLADDER IRRITANTS. PATIENT VERY APPRECIATIVE OF CALL.

## 2021-01-08 ENCOUNTER — OFFICE VISIT (OUTPATIENT)
Dept: UROLOGY | Facility: CLINIC | Age: 66
End: 2021-01-08

## 2021-01-08 VITALS — TEMPERATURE: 98.5 F | BODY MASS INDEX: 33.05 KG/M2 | HEIGHT: 72 IN | WEIGHT: 244 LBS

## 2021-01-08 DIAGNOSIS — N39.0 RECURRENT UTI: Primary | ICD-10-CM

## 2021-01-08 DIAGNOSIS — N32.89 BLADDER SPASM: ICD-10-CM

## 2021-01-08 DIAGNOSIS — N39.42 URINARY INCONTINENCE WITHOUT SENSORY AWARENESS: ICD-10-CM

## 2021-01-08 PROCEDURE — 99214 OFFICE O/P EST MOD 30 MIN: CPT | Performed by: NURSE PRACTITIONER

## 2021-01-08 PROCEDURE — 51798 US URINE CAPACITY MEASURE: CPT | Performed by: NURSE PRACTITIONER

## 2021-01-08 NOTE — PROGRESS NOTES
Chief Complaint:          Chief Complaint   Patient presents with   • Recurrent Uti     Follow up        HPI:   65 y.o. male.,  Very pleasant but ill looking and pale patient evaluated in clinic today.  He is a follow-up for recurrent urinary tract infections and urinary incontinence.  He has significant polypharmacy.    He reports currently doing better on antibiotic prophylaxis with Macrobid.  He also reports an improvement in his urinary symptoms.  He states he still has episodes of frequency, urgency, and nocturia . He has significant urinary incontinence without sensory awareness most of his days, he currently wears depends.  However, he denies dysuria, burning on urination, or gross hematuria.  He has chronic back pain, but denies abdominal pain, pelvic pain and pressure.  He denies fever or chills, he does not have nausea, vomiting, or diarrhea.    His last urine culture was negative, he is unable to give us any urine sample today, his IPSS score is 10, his PVR is 33 cc. Overall he reports doing better, he states he is currently taking his antibiotics and probiotics diligently.  He reports  increasing his p.o. fluid intake to at least 2 L daily, and has significantly reduced his intake of soda drinks, bladder irritants.      Past Medical History:        Past Medical History:   Diagnosis Date   • Arthritis    • Bladder stones    • BPH (benign prostatic hyperplasia)    • Brain aneurysm     1987   • Brain aneurysm 1987   • Cancer (CMS/[facility])     skin ca- felix   • Claustrophobia    • COPD (chronic obstructive pulmonary disease) (CMS/[facility])    • Coronary artery disease    • Diabetes (CMS/[facility])    • Difficulty urinating    • Fracture, finger    • GERD (gastroesophageal reflux disease)    • Hematuria    • High cholesterol    • History of sepsis    • Hypertension    • MI (myocardial infarction) (CMS/[facility])     2013   • Neuropathy     FEET AND LEGS   • Prostate disease    • Stroke (CMS/[facility])     X3 last one 9/2016   TIA   •  TIA (transient ischemic attack)    • Unsteady gait    • UTI (urinary tract infection)      The following portions of the patient's history were reviewed and updated as appropriate: allergies, current medications, past family history, past medical history, past social history, past surgical history and problem list.    Current Meds:     Current Outpatient Medications   Medication Sig Dispense Refill   • Alcohol Swabs (B-D SINGLE USE SWABS REGULAR) pads      • aspirin  MG tablet Take 325 mg by mouth Every 6 (Six) Hours As Needed.     • Blood Glucose Monitoring Suppl (ACCU-CHEK JOSE G) device Use as directed to test blood sugar three times daily and and bedtime. 1 each 0   • clopidogrel (PLAVIX) 75 MG tablet      • diclofenac (VOLTAREN) 75 MG EC tablet Take 1 tablet by mouth 2 (Two) Times a Day. 30 tablet 0   • doxazosin (CARDURA) 8 MG tablet Take 8 mg by mouth Every Morning.     • finasteride (PROSCAR) 5 MG tablet Take 1 tablet by mouth Daily. 30 tablet 5   • furosemide (LASIX) 20 MG tablet Take 20 mg by mouth Daily As Needed.     • gabapentin (NEURONTIN) 600 MG tablet 600 mg 3 (Three) Times a Day.     • glimepiride (AMARYL) 4 MG tablet Take 4 mg by mouth Every Morning Before Breakfast.     • glucose blood test strip Use as directed to test blood sugar three times daily and and bedtime. 120 each 0   • insulin detemir (LEVEMIR) 100 UNIT/ML injection Inject 15 Units under the skin Daily. 15 mL 0   • Insulin Pen Needle 29G X 12.7MM misc Use as directed with insulin three times daily with meals and at bedtime. 120 each 0   • losartan (COZAAR) 50 MG tablet Take 1 tablet by mouth Daily. 90 tablet 1   • metFORMIN (GLUCOPHAGE) 500 MG tablet Take 500 mg by mouth 2 (Two) Times a Day With Meals.     • metoprolol succinate XL (TOPROL-XL) 25 MG 24 hr tablet Take 0.5 tablets by mouth Daily. 45 tablet 1   • nitrofurantoin, macrocrystal-monohydrate, (Macrobid) 100 MG capsule Take 1 capsule by mouth Daily. 56 capsule 5   •  nitroglycerin (NITROSTAT) 0.4 MG SL tablet 1 under the tongue as needed for angina, may repeat q5mins for up three doses 30 tablet 1   • ondansetron (ZOFRAN) 8 MG tablet      • ondansetron ODT (ZOFRAN-ODT) 4 MG disintegrating tablet Place 1 tablet on the tongue Every 6 (Six) Hours As Needed for Nausea or Vomiting. 20 tablet 0   • oxybutynin (DITROPAN) 5 MG tablet Take 1 tablet by mouth 2 (Two) Times a Day. 60 tablet 3   • oxyCODONE-acetaminophen (Percocet)  MG per tablet Take 1 tablet by mouth Every 4 (Four) Hours As Needed for Moderate Pain 12 tablet 0   • pantoprazole (PROTONIX) 40 MG EC tablet Take 40 mg by mouth daily.     • pioglitazone (ACTOS) 15 MG tablet      • polyethylene glycol (MIRALAX) pack packet Mix 17 g with liquid of choice and drink   Daily. 500 g 0   • rosuvastatin (CRESTOR) 5 MG tablet Take 1 tablet by mouth Daily. 90 tablet 1   • tamsulosin (FLOMAX) 0.4 MG capsule 24 hr capsule Take 1 capsule by mouth Every Night. 90 capsule 4   • traMADol (ULTRAM) 50 MG tablet      • TRUEPLUS LANCETS 33G misc Use as directed to test blood sugar three times daily and and bedtime. 120 each 0   • zolpidem (AMBIEN) 10 MG tablet Take 10 mg by mouth at night as needed.       No current facility-administered medications for this visit.         Allergies:      No Known Allergies     Past Surgical History:     Past Surgical History:   Procedure Laterality Date   • CARDIAC CATHETERIZATION  03/03/2014   • CARDIAC SURGERY     • CARDIOVASCULAR STRESS TEST  04/04/2013   • CATARACT EXTRACTION     • CEREBRAL ANEURYSM REPAIR     • COLONOSCOPY     • CORONARY ANGIOPLASTY WITH STENT PLACEMENT     • CYSTOSCOPY LITHOLAPAXY BLADDER STONE EXTRACTION N/A 11/29/2017    Procedure: CYSTOSCOPY LITHOLAPAXY BLADDER STONE EXTRACTION;  Surgeon: Flavio Barrios MD;  Location: [facility];  Service:    • ECHO - CONVERTED  02/02/2014   • EYE SURGERY     • INNER EAR SURGERY     • SKIN BIOPSY     • TONSILLECTOMY     • URETHRAL DILATATION  N/A 6/1/2020    Procedure: CYSTOSCOPY URETHRAL DILATATION;  Surgeon: Flavio Barrios MD;  Location: [facility];  Service: Urology;  Laterality: N/A;         Social History:     Social History     Socioeconomic History   • Marital status: Single     Spouse name: Not on file   • Number of children: Not on file   • Years of education: Not on file   • Highest education level: Not on file   Tobacco Use   • Smoking status: Current Every Day Smoker     Packs/day: 1.00     Years: 45.00     Pack years: 45.00     Types: Cigarettes   • Smokeless tobacco: Never Used   Substance and Sexual Activity   • Alcohol use: No   • Drug use: No   • Sexual activity: Defer     Birth control/protection: None       Family History:     Family History   Problem Relation Age of Onset   • Osteoporosis Mother    • Cancer Mother         breast and lung   • Cancer Father         small cell cancer, kidney   • Heart disease Father    • Diabetes Father    • No Known Problems Sister    • Aneurysm Brother        Review of Systems:     Review of Systems   Constitutional: Positive for activity change, appetite change and fatigue. Negative for chills and fever.   HENT: Negative for congestion and sinus pressure.    Eyes: Negative for blurred vision and double vision.   Respiratory: Positive for shortness of breath. Negative for wheezing.    Cardiovascular: Negative for chest pain.   Gastrointestinal: Positive for abdominal distention. Negative for abdominal pain, constipation, diarrhea, nausea and vomiting.   Genitourinary: Positive for nocturia and urinary incontinence. Negative for discharge, dysuria, flank pain, frequency, genital sores, hematuria, penile pain, penile swelling, scrotal swelling, testicular pain and urgency.   Musculoskeletal: Positive for back pain and gait problem ( USES CANE). Negative for neck pain.   Skin: Positive for color change and pallor.   Neurological: Negative for dizziness, weakness, headache and confusion.    Hematological: Bruises/bleeds easily.   Psychiatric/Behavioral: Positive for sleep disturbance and stress. Negative for agitation, behavioral problems and decreased concentration. The patient is not nervous/anxious.       IPSS Questionnaire (AUA-7):  Over the past month…    1)  How often have you had a sensation of not emptying your bladder completely after you finish urinating?  1 - Less than 1 time in 5   2)  How often have you had to urinate again less than two hours after you finished urinating? 1 - Less than 1 time in 5   3)  How often have you found you stopped and started again several times when you urinated?  0 - Not at all   4) How difficult have you found it to postpone urination?  2 - Less than half the time   5) How often have you had a weak urinary stream?  1 - Less than 1 time in 5   6) How often have you had to push or strain to begin urination?  2 - Less than half the time   7) How many times did you most typically get up to urinate from the time you went to bed until the time you got up in the morning?  3 - 3 times   Total score:  0-7 mildly symptomatic    8-19 moderately symptomatic................................................10    20-35 severely symptomatic         Physical Exam:     Physical Exam  Constitutional:       General: He is in acute distress.      Appearance: He is well-developed. He is obese. He is ill-appearing.   HENT:      Head: Normocephalic and atraumatic.      Right Ear: External ear normal.      Left Ear: External ear normal.   Eyes:      General:         Right eye: No discharge.         Left eye: No discharge.      Conjunctiva/sclera: Conjunctivae normal.      Pupils: Pupils are equal, round, and reactive to light.   Neck:      Musculoskeletal: Normal range of motion and neck supple.      Thyroid: No thyromegaly.      Trachea: No tracheal deviation.   Cardiovascular:      Rate and Rhythm: Normal rate and regular rhythm.      Heart sounds: No murmur. No friction rub.    Pulmonary:      Effort: Pulmonary effort is normal. No respiratory distress.      Breath sounds: Normal breath sounds. No stridor.   Abdominal:      General: Bowel sounds are normal. There is distension.      Palpations: Abdomen is soft.      Tenderness: There is abdominal tenderness. There is no guarding.   Genitourinary:     Penis: Normal and uncircumcised. No tenderness or discharge.       Scrotum/Testes: Normal.      Rectum: Normal. Guaiac result negative.   Musculoskeletal: Normal range of motion.         General: Tenderness present. No deformity.      Right lower leg: Edema present.      Left lower leg: Edema present.   Skin:     General: Skin is warm and dry.      Capillary Refill: Capillary refill takes less than 2 seconds.      Coloration: Skin is pale.   Neurological:      Mental Status: He is alert and oriented to person, place, and time.      Cranial Nerves: No cranial nerve deficit.      Motor: Weakness present.      Coordination: Coordination normal.   Psychiatric:         Behavior: Behavior normal.         Thought Content: Thought content normal.         Judgment: Judgment normal.       Procedure:       Assessment/Plan:         ASSESSMENT:  RECURRENT UTI/ Urinary Incontinence: Very pleasant but ill looking male returns to clinic today for follow-up on the  Recurrent UTI's.  He is in no apparent distress today. BUT reports significant concerns with his urinary incontinent episodes. He Is currently on Ditropan 5 mg twice daily and reports minimal to no benefit as he now has to wear depends all the time.    He has significant polypharmacy Care, and is unable to give us any urine sample today.  IPSS score is 10, his PVR is 33 cc.  He reports doing relatively better on his antibiotic prophylaxis with 100 mg of Macrobid daily, and would like to continue.  Upon evaluation today, he reports having any symptoms or episodes of frequency and urgency at times.  He has chronic back, but denies abdominal pain and  flank pain.  Does not have CVA tenderness, he has no urinary symptoms of dysuria, or burning on urination.  He has not had any gross hematuria.  He has occasional nausea but denies V/D.     AGAIN,  We discussed the risk factors for recurrent infections being intercourse in younger patients and atrophic changes in older patients.  We discussed the symptoms that are found including pain, pressure, burning, frequency, urgency suprapubic pain and painful intercourse. I discussed upper tract symptoms including fevers, chills, and indicated the workup would be much more aggressive if the patient were to present with recurrent infections in the face of upper tract symptomatology such as fever.         PLAN  We discussed a drop of urine sample anytime for analysis.    Stop Ditropan 5 mg twice daily x1 month.    Start Myrbetriq 50 mg 1 tablet daily-samples given to try    We discussed he continue his antibiotic prophylaxis as directed.  He should increase his p.o. fluid intake to at least 1 to 2 L daily and avoid bladder irritants such as caffeine products, spicy foods, and citrusy foods.     I recommend concomitant probiotics with treatment with antibiotics to protect the rectal reservoir including over-the-counter yogurt preparations to marcy oral pills containing the appropriate probiotics. Patient reports the diligent use of Probiotics.    We discussed bathroom schedule and voiding at time intervals to prevent incontinence, also discussed pelvic muscle exercises as tolerated.     Will see him back in 4 weeks for Follow up in clinic and recheck his urinalysis at that time.  We will also evaluate benefits of Myrbetriq 50 mg daily.    Patient is agreeable to plan of care    Patient reports that he is currently experiencing Many symptoms of urinary incontinence.    Patient's Body mass index is 33.09 kg/m². BMI is above normal parameters. Recommendations include: educational material, exercise counseling and nutrition  counseling.    Smoking Cessation Counseling:  Current every day smoker. less than 3 minutes spent counseling. Has reduced tobacco use.  I advised patient to quit tobacco use and offered support.  I provided patient with tobacco cessation educational material printed in the patient's After Visit Summary.     Counseling was given to patient for the following topics diagnostic results including: Recurrent UTIs, dysuria, urine incontinence., instructions for management as follows: Increase p.o. fluid intake, Myrbetriq 50 mg daily, Macrobid 100 mg daily, biotics. and risk factor reductions including: Smoking cessation, avoiding bladder irritants.. The interim medical history and current results were reviewed.  A treatment plan with follow-up was made for: Urine incontinence without sensory awareness, bladder spasms. Recurrent UTI [N39.0].           This document has been electronically signed by Griselda Cheng-Akwa, APRN January 8, 2021 10:47 EST

## 2021-01-08 NOTE — PROGRESS NOTES
Chief Complaint:          ***    HPI:   65 y.o. male.    HPI      Past Medical History:        Past Medical History:   Diagnosis Date   • Arthritis    • Bladder stones    • BPH (benign prostatic hyperplasia)    • Brain aneurysm     1987   • Brain aneurysm 1987   • Cancer (CMS/[facility])     skin ca- felix   • Claustrophobia    • COPD (chronic obstructive pulmonary disease) (CMS/[facility])    • Coronary artery disease    • Diabetes (CMS/[facility])    • Difficulty urinating    • Fracture, finger    • GERD (gastroesophageal reflux disease)    • Hematuria    • High cholesterol    • History of sepsis    • Hypertension    • MI (myocardial infarction) (CMS/[facility])     2013   • Neuropathy     FEET AND LEGS   • Prostate disease    • Stroke (CMS/[facility])     X3 last one 9/2016   TIA   • TIA (transient ischemic attack)    • Unsteady gait    • UTI (urinary tract infection)          Current Meds:     Current Outpatient Medications   Medication Sig Dispense Refill   • Alcohol Swabs (B-D SINGLE USE SWABS REGULAR) pads      • aspirin  MG tablet Take 325 mg by mouth Every 6 (Six) Hours As Needed.     • Blood Glucose Monitoring Suppl (ACCU-CHEK JOSE G) device Use as directed to test blood sugar three times daily and and bedtime. 1 each 0   • clopidogrel (PLAVIX) 75 MG tablet      • diclofenac (VOLTAREN) 75 MG EC tablet Take 1 tablet by mouth 2 (Two) Times a Day. 30 tablet 0   • doxazosin (CARDURA) 8 MG tablet Take 8 mg by mouth Every Morning.     • finasteride (PROSCAR) 5 MG tablet Take 1 tablet by mouth Daily. 30 tablet 5   • furosemide (LASIX) 20 MG tablet Take 20 mg by mouth Daily As Needed.     • gabapentin (NEURONTIN) 600 MG tablet 600 mg 3 (Three) Times a Day.     • glimepiride (AMARYL) 4 MG tablet Take 4 mg by mouth Every Morning Before Breakfast.     • glucose blood test strip Use as directed to test blood sugar three times daily and and bedtime. 120 each 0   • insulin detemir (LEVEMIR) 100 UNIT/ML injection Inject 15 Units under the skin Daily.  15 mL 0   • Insulin Pen Needle 29G X 12.7MM misc Use as directed with insulin three times daily with meals and at bedtime. 120 each 0   • losartan (COZAAR) 50 MG tablet Take 1 tablet by mouth Daily. 90 tablet 1   • metFORMIN (GLUCOPHAGE) 500 MG tablet Take 500 mg by mouth 2 (Two) Times a Day With Meals.     • metoprolol succinate XL (TOPROL-XL) 25 MG 24 hr tablet Take 0.5 tablets by mouth Daily. 45 tablet 1   • nitrofurantoin, macrocrystal-monohydrate, (Macrobid) 100 MG capsule Take 1 capsule by mouth Daily. 56 capsule 5   • nitroglycerin (NITROSTAT) 0.4 MG SL tablet 1 under the tongue as needed for angina, may repeat q5mins for up three doses 30 tablet 1   • ondansetron (ZOFRAN) 8 MG tablet      • ondansetron ODT (ZOFRAN-ODT) 4 MG disintegrating tablet Place 1 tablet on the tongue Every 6 (Six) Hours As Needed for Nausea or Vomiting. 20 tablet 0   • oxybutynin (DITROPAN) 5 MG tablet Take 1 tablet by mouth 2 (Two) Times a Day. 60 tablet 3   • oxyCODONE-acetaminophen (Percocet)  MG per tablet Take 1 tablet by mouth Every 4 (Four) Hours As Needed for Moderate Pain 12 tablet 0   • pantoprazole (PROTONIX) 40 MG EC tablet Take 40 mg by mouth daily.     • pioglitazone (ACTOS) 15 MG tablet      • polyethylene glycol (MIRALAX) pack packet Mix 17 g with liquid of choice and drink   Daily. 500 g 0   • rosuvastatin (CRESTOR) 5 MG tablet Take 1 tablet by mouth Daily. 90 tablet 1   • tamsulosin (FLOMAX) 0.4 MG capsule 24 hr capsule Take 1 capsule by mouth Every Night. 90 capsule 4   • traMADol (ULTRAM) 50 MG tablet      • TRUEPLUS LANCETS 33G misc Use as directed to test blood sugar three times daily and and bedtime. 120 each 0   • zolpidem (AMBIEN) 10 MG tablet Take 10 mg by mouth at night as needed.       No current facility-administered medications for this visit.         Allergies:      No Known Allergies     Past Surgical History:     Past Surgical History:   Procedure Laterality Date   • CARDIAC CATHETERIZATION   2014   • CARDIAC SURGERY     • CARDIOVASCULAR STRESS TEST  2013   • CATARACT EXTRACTION     • CEREBRAL ANEURYSM REPAIR     • COLONOSCOPY     • CORONARY ANGIOPLASTY WITH STENT PLACEMENT     • CYSTOSCOPY LITHOLAPAXY BLADDER STONE EXTRACTION N/A 2017    Procedure: CYSTOSCOPY LITHOLAPAXY BLADDER STONE EXTRACTION;  Surgeon: Flavio Barrios MD;  Location: [facility];  Service:    • ECHO - CONVERTED  2014   • EYE SURGERY     • INNER EAR SURGERY     • SKIN BIOPSY     • TONSILLECTOMY     • URETHRAL DILATATION N/A 2020    Procedure: CYSTOSCOPY URETHRAL DILATATION;  Surgeon: Flavio Barrios MD;  Location: [facility];  Service: Urology;  Laterality: N/A;         Social History:     Social History     Socioeconomic History   • Marital status: Single     Spouse name: Not on file   • Number of children: Not on file   • Years of education: Not on file   • Highest education level: Not on file   Tobacco Use   • Smoking status: Current Every Day Smoker     Packs/day: 1.00     Years: 45.00     Pack years: 45.00     Types: Cigarettes   • Smokeless tobacco: Never Used   Substance and Sexual Activity   • Alcohol use: No   • Drug use: No   • Sexual activity: Defer     Birth control/protection: None       Family History:     Family History   Problem Relation Age of Onset   • Osteoporosis Mother    • Cancer Mother         breast and lung   • Cancer Father         small cell cancer, kidney   • Heart disease Father    • Diabetes Father    • No Known Problems Sister    • Aneurysm Brother        Review of Systems:     Review of Systems    IPSS Questionnaire (AUA-7):  Over the past month…    1)  Incomplete Emptying  How often have you had a sensation of not emptying your bladder?  {Ratin}   2)  Frequency  How often have you had to urinate less than every two hours? {Ratin}   3)  Intermittency  How often have you found you stopped and started again several times when you urinated?   "{Ratin}   4) Urgency  How often have you found it difficult to postpone urination?  {Ratin}   5) Weak Stream  How often have you had a weak urinary stream?  {Ratin}   6) Straining  How often have you had to push or strain to begin urination?  {Ratin}   7) Nocturia  How many times did you typically get up at night to urinate?  {Ratin}   Total Score:  {0-35:55034}       Quality of life due to urinary symptoms:  If you were to spend the rest of your life with your urinary condition the way it is now, how would you feel about that? {Ratin}   Urine Leakage (Incontinence) {Ratin}       {Review Statement:39637}  Physical Exam:     Physical Exam    Ht 182.9 cm (72\")   BMI 33.28 kg/m²    Procedure:         Assessment:   No diagnosis found.    No orders of the defined types were placed in this encounter.      Patient reports that he is not currently experiencing any symptoms of urinary incontinence.      Plan:   ***    Patient's Body mass index is 33.28 kg/m². BMI is {BMI range:64329}.      Smoking Cessation Counseling:  {Smoking Cessation Status and Time:45168}  {Smoking Cessation COR:24561}    During this visit, I spent *** minutes counseling Jovanni regarding tobacco cessation.        Counseling was given to {person:[id number]} for the following topics {topic:54771}. The interim medical history and current results were reviewed.  A treatment plan with follow-up was made for No primary diagnosis found.. I spent *** minutes face to face with Jovanni Richter and *** percentage was spent in counseling.       This document has been electronically signed by Trevor Valdes MD 2021 09:11 EST      "

## 2021-02-09 ENCOUNTER — OFFICE VISIT (OUTPATIENT)
Dept: UROLOGY | Facility: CLINIC | Age: 66
End: 2021-02-09

## 2021-02-09 VITALS — TEMPERATURE: 97.3 F | HEIGHT: 72 IN | BODY MASS INDEX: 33.18 KG/M2 | WEIGHT: 245 LBS

## 2021-02-09 DIAGNOSIS — N30.20 CHRONIC CYSTITIS: ICD-10-CM

## 2021-02-09 DIAGNOSIS — R35.0 FREQUENCY OF MICTURITION: ICD-10-CM

## 2021-02-09 DIAGNOSIS — N39.0 RECURRENT UTI: Primary | ICD-10-CM

## 2021-02-09 DIAGNOSIS — N32.81 OVERACTIVE BLADDER: ICD-10-CM

## 2021-02-09 PROCEDURE — 87086 URINE CULTURE/COLONY COUNT: CPT | Performed by: NURSE PRACTITIONER

## 2021-02-09 PROCEDURE — 81003 URINALYSIS AUTO W/O SCOPE: CPT | Performed by: NURSE PRACTITIONER

## 2021-02-09 PROCEDURE — 99214 OFFICE O/P EST MOD 30 MIN: CPT | Performed by: NURSE PRACTITIONER

## 2021-02-09 RX ORDER — LINACLOTIDE 72 UG/1
CAPSULE, GELATIN COATED ORAL DAILY
COMMUNITY
Start: 2020-12-15 | End: 2021-03-11

## 2021-02-09 RX ORDER — MELOXICAM 7.5 MG/1
7.5 TABLET ORAL DAILY
COMMUNITY
Start: 2021-02-05 | End: 2021-12-07

## 2021-02-09 RX ORDER — LINACLOTIDE 145 UG/1
CAPSULE, GELATIN COATED ORAL DAILY
COMMUNITY
Start: 2020-12-15 | End: 2021-03-11

## 2021-02-09 RX ORDER — CYCLOBENZAPRINE HCL 10 MG
TABLET ORAL 2 TIMES DAILY
COMMUNITY
Start: 2021-01-18 | End: 2021-04-13

## 2021-02-09 NOTE — PROGRESS NOTES
Chief Complaint:          Chief Complaint   Patient presents with   • Recurrent UTI / OverActive Bladder     1 mnth f/u Chronic Cystitis /  Recurrent UTI /OAB       HPI:   65 y.o. male with a significant history of recurrent utis, Urine incontinence with incomplete bladder emptying, returns to clinic today for follow up. He is in no apparent distress and reports doing better since starting myrbetriq.  He has extensive polypharmacy.    He also reports currently doing better on antibiotic prophylaxis with Macrobid and would like to continue. He also reports a significant improvement in his urinary symptoms.  He states he still has episodes of frequency, urgency,burning on urination and nocturia,  However, he denies dysuria,  And any episodes of gross hematuria.      He had significant urinary incontinence without sensory awareness most of his days,But reports this too has improved and he now only uses 2-3 depends, compared to 6-8 prior. He has chronic back pain, but denies abdominal pain, flank pain or any CVA tenderness. Denies pelvic pain and pressure.  He denies fever or chills, he does not have nausea, vomiting, or diarrhea.     His last urine culture was negative, He has been negative for UTI for almost 6 months now.His urine sample today show 3+ leukocyte Esterace, Negative nitrites, 20+ microhematuria, his IPSS score is 8, his PVR is 23 cc.     Overall he reports doing better, he states he is currently taking his antibiotics and probiotics diligently.  He reports  increasing his p.o. fluid intake to at least 2 L daily, and has significantly reduced his intake of soda drinks, bladder irritants.         Past Medical History:        Past Medical History:   Diagnosis Date   • Arthritis    • Bladder stones    • BPH (benign prostatic hyperplasia)    • Brain aneurysm     1987   • Brain aneurysm 1987   • Cancer (CMS/[facility])     skin ca- felix   • Claustrophobia    • COPD (chronic obstructive pulmonary disease) (CMS/[facility])     • Coronary artery disease    • Diabetes (CMS/[facility])    • Difficulty urinating    • Fracture, finger    • GERD (gastroesophageal reflux disease)    • Hematuria    • High cholesterol    • History of sepsis    • Hypertension    • MI (myocardial infarction) (CMS/[facility])     2013   • Neuropathy     FEET AND LEGS   • Prostate disease    • Stroke (CMS/[facility])     X3 last one 9/2016   TIA   • TIA (transient ischemic attack)    • Unsteady gait    • UTI (urinary tract infection)        The following portions of the patient's history were reviewed and updated as appropriate: allergies, current medications, past family history, past medical history, past social history, past surgical history and problem list.    Current Meds:     Current Outpatient Medications   Medication Sig Dispense Refill   • Alcohol Swabs (B-D SINGLE USE SWABS REGULAR) pads      • aspirin  MG tablet Take 325 mg by mouth Every 6 (Six) Hours As Needed.     • Blood Glucose Monitoring Suppl (ACCU-CHEK JOSE G) device Use as directed to test blood sugar three times daily and and bedtime. 1 each 0   • clopidogrel (PLAVIX) 75 MG tablet      • cyclobenzaprine (FLEXERIL) 10 MG tablet 2 (two) times a day.     • diclofenac (VOLTAREN) 75 MG EC tablet Take 1 tablet by mouth 2 (Two) Times a Day. 30 tablet 0   • Diclofenac Sodium (VOLTAREN) 1 % gel gel As Needed.     • doxazosin (CARDURA) 8 MG tablet Take 8 mg by mouth Every Morning.     • finasteride (PROSCAR) 5 MG tablet Take 1 tablet by mouth Daily. 30 tablet 5   • furosemide (LASIX) 20 MG tablet Take 20 mg by mouth Daily As Needed.     • gabapentin (NEURONTIN) 600 MG tablet 600 mg 3 (Three) Times a Day.     • glimepiride (AMARYL) 4 MG tablet Take 4 mg by mouth Every Morning Before Breakfast.     • glucose blood test strip Use as directed to test blood sugar three times daily and and bedtime. 120 each 0   • insulin detemir (LEVEMIR) 100 UNIT/ML injection Inject 15 Units under the skin Daily. 15 mL 0   • Insulin Pen  Needle 29G X 12.7MM misc Use as directed with insulin three times daily with meals and at bedtime. 120 each 0   • Linzess 145 MCG capsule capsule Daily.     • Linzess 72 MCG capsule capsule Daily.     • losartan (COZAAR) 50 MG tablet Take 1 tablet by mouth Daily. 90 tablet 1   • meloxicam (MOBIC) 7.5 MG tablet 2 (two) times a day.     • metFORMIN (GLUCOPHAGE) 500 MG tablet Take 500 mg by mouth 2 (Two) Times a Day With Meals.     • metoprolol succinate XL (TOPROL-XL) 25 MG 24 hr tablet Take 0.5 tablets by mouth Daily. 45 tablet 1   • nitrofurantoin, macrocrystal-monohydrate, (Macrobid) 100 MG capsule Take 1 capsule by mouth Daily. 56 capsule 5   • nitroglycerin (NITROSTAT) 0.4 MG SL tablet 1 under the tongue as needed for angina, may repeat q5mins for up three doses 30 tablet 1   • ondansetron (ZOFRAN) 8 MG tablet      • ondansetron ODT (ZOFRAN-ODT) 4 MG disintegrating tablet Place 1 tablet on the tongue Every 6 (Six) Hours As Needed for Nausea or Vomiting. 20 tablet 0   • oxybutynin (DITROPAN) 5 MG tablet Take 1 tablet by mouth 2 (Two) Times a Day. 60 tablet 3   • oxyCODONE-acetaminophen (Percocet)  MG per tablet Take 1 tablet by mouth Every 4 (Four) Hours As Needed for Moderate Pain 12 tablet 0   • pantoprazole (PROTONIX) 40 MG EC tablet Take 40 mg by mouth daily.     • pioglitazone (ACTOS) 15 MG tablet      • polyethylene glycol (MIRALAX) pack packet Mix 17 g with liquid of choice and drink   Daily. 500 g 0   • rosuvastatin (CRESTOR) 5 MG tablet Take 1 tablet by mouth Daily. 90 tablet 1   • tamsulosin (FLOMAX) 0.4 MG capsule 24 hr capsule Take 1 capsule by mouth Every Night. 90 capsule 4   • traMADol (ULTRAM) 50 MG tablet      • TRUEPLUS LANCETS 33G misc Use as directed to test blood sugar three times daily and and bedtime. 120 each 0   • zolpidem (AMBIEN) 10 MG tablet Take 10 mg by mouth at night as needed.       No current facility-administered medications for this visit.         Allergies:      No Known  Allergies     Past Surgical History:     Past Surgical History:   Procedure Laterality Date   • CARDIAC CATHETERIZATION  03/03/2014   • CARDIAC SURGERY     • CARDIOVASCULAR STRESS TEST  04/04/2013   • CATARACT EXTRACTION     • CEREBRAL ANEURYSM REPAIR     • COLONOSCOPY     • CORONARY ANGIOPLASTY WITH STENT PLACEMENT     • CYSTOSCOPY LITHOLAPAXY BLADDER STONE EXTRACTION N/A 11/29/2017    Procedure: CYSTOSCOPY LITHOLAPAXY BLADDER STONE EXTRACTION;  Surgeon: Flavio Barrios MD;  Location: [facility];  Service:    • ECHO - CONVERTED  02/02/2014   • EYE SURGERY     • INNER EAR SURGERY     • SKIN BIOPSY     • TONSILLECTOMY     • URETHRAL DILATATION N/A 6/1/2020    Procedure: CYSTOSCOPY URETHRAL DILATATION;  Surgeon: Flavio Barrios MD;  Location: [facility];  Service: Urology;  Laterality: N/A;         Social History:     Social History     Socioeconomic History   • Marital status: Single     Spouse name: Not on file   • Number of children: Not on file   • Years of education: Not on file   • Highest education level: Not on file   Tobacco Use   • Smoking status: Current Every Day Smoker     Packs/day: 1.00     Years: 45.00     Pack years: 45.00     Types: Cigarettes   • Smokeless tobacco: Never Used   Substance and Sexual Activity   • Alcohol use: No   • Drug use: No   • Sexual activity: Defer     Birth control/protection: None       Family History:     Family History   Problem Relation Age of Onset   • Osteoporosis Mother    • Cancer Mother         breast and lung   • Cancer Father         small cell cancer, kidney   • Heart disease Father    • Diabetes Father    • No Known Problems Sister    • Aneurysm Brother        Review of Systems:     Review of Systems   Constitutional: Positive for activity change and fatigue. Negative for appetite change, chills and fever.   HENT: Negative for congestion and sinus pressure.    Eyes: Negative for blurred vision and double vision.   Respiratory: Negative for shortness  of breath and wheezing.    Gastrointestinal: Negative for abdominal pain, constipation, diarrhea, nausea and vomiting.   Genitourinary: Positive for frequency, nocturia, urgency and urinary incontinence. Negative for difficulty urinating, discharge, dysuria, flank pain, genital sores, hematuria, penile pain, penile swelling, scrotal swelling and testicular pain.   Musculoskeletal: Positive for back pain and myalgias.   Skin: Positive for pallor.   Neurological: Negative for dizziness, weakness and confusion.   Psychiatric/Behavioral: Positive for sleep disturbance and stress. Negative for agitation, behavioral problems and decreased concentration.      IPSS Questionnaire (AUA-7):  Over the past month…    1)  How often have you had a sensation of not emptying your bladder completely after you finish urinating?  1 - Less than 1 time in 5   2)  How often have you had to urinate again less than two hours after you finished urinating? 1 - Less than 1 time in 5   3)  How often have you found you stopped and started again several times when you urinated?  0 - Not at all   4) How difficult have you found it to postpone urination?  1 - Less than 1 time in 5   5) How often have you had a weak urinary stream?  1 - Less than 1 time in 5   6) How often have you had to push or strain to begin urination?  2 - Less than half the time   7) How many times did you most typically get up to urinate from the time you went to bed until the time you got up in the morning?  2 - 2 times   Total score:  0-7 mildly symptomatic.........................................................8    8-19 moderately symptomatic    20-35 severely symptomatic         Physical Exam:     Physical Exam  Constitutional:       General: He is not in acute distress.     Appearance: He is well-developed. He is ill-appearing.   HENT:      Head: Normocephalic and atraumatic.      Right Ear: External ear normal.      Left Ear: External ear normal.   Eyes:      General:          Right eye: No discharge.         Left eye: No discharge.      Conjunctiva/sclera: Conjunctivae normal.      Pupils: Pupils are equal, round, and reactive to light.   Neck:      Musculoskeletal: Normal range of motion and neck supple.      Thyroid: No thyromegaly.      Trachea: No tracheal deviation.   Cardiovascular:      Rate and Rhythm: Normal rate and regular rhythm.      Heart sounds: No murmur. No friction rub.   Pulmonary:      Effort: Pulmonary effort is normal. No respiratory distress.      Breath sounds: Normal breath sounds. No stridor.   Abdominal:      General: Bowel sounds are normal. There is distension.      Palpations: Abdomen is soft.      Tenderness: There is abdominal tenderness. There is no guarding.   Genitourinary:     Penis: Normal and uncircumcised. No tenderness or discharge.       Scrotum/Testes: Normal.      Rectum: Normal. Guaiac result negative.   Musculoskeletal: Normal range of motion.         General: Tenderness present. No deformity.   Skin:     General: Skin is warm and dry.      Capillary Refill: Capillary refill takes less than 2 seconds.      Coloration: Skin is pale.   Neurological:      Mental Status: He is alert and oriented to person, place, and time.      Cranial Nerves: No cranial nerve deficit.      Coordination: Coordination normal.   Psychiatric:         Behavior: Behavior normal.         Thought Content: Thought content normal.         Judgment: Judgment normal.         Procedure:       Assessment/Plan:        ASSESSMENT  Recurrent urinary tract infections/Overactive Bladder/urine Incontinence: Very pleasant, ill-looking 65-year-old male returns to clinic today for follow-up on recurrent UTI's, and Urinary incontinence episodes that have remained very bothersome to him.  He however reports doing relatively better, with decreased urinary incontinent episodes, and decreased bladder symptoms consistent with UTIs.  He is urine dipstick today is completely negative  for any infection, he has 2+ microscopic hematuria.  His IPSS score is 8, his PVR is 23 cc..    The patient reports doing relatively better on his antibiotic prophylaxis, has resumed his Myrbetriq daily, and would like to continue.  We discussed the types of organisms that are found in the urinary tract indicating that the vast majority are results of the patient's own gastrointestinal jerrica.     Again, We discussed the risk factors for recurrent infections being intercourse in younger patients and atrophic changes in older patients.  We discussed the symptoms that are found including pain, pressure, burning, frequency, urgency suprapubic pain and painful intercourse. I discussed upper tract symptoms including fevers, chills, and indicated the workup would be much more aggressive if the patient were to present with recurrent infections in the face of upper tract symptomatology such as fever.    Urinary Incontinence: None we discussed overactive bladder.  Although significantly improved on Myrbetriq 50 mg daily, the patient remains very bothered by his symptoms. We discussed treatable and non-treatable causes of both stress and urge urinary incontinence.  With regards to stress urinary incontinence we discussed its relationship to childbirth in females and pelvic health in both males and females.  We discussed the grading of stress incontinence with trying to quantitate the number of pull ups used daily(he is down to 2-3 daily compared to 6-8 prior).  We talked about leaking urine with laughing, lifting, coughing, and sexual intercourse.  Next we discussed the various therapeutic options including anticholinergics, beta 3 agonists, and alpha blockade if there is a component of obstruction.  I discussed the side effects of anti-cholinergic including dry mouth, double vision.     PLAN  We resent his urine for culture, I will will call him with results if any bacterial growth resistant to current therapy.    We  discussed he continue his antibiotic prophylaxis with Macrobid daily as directed.  He should increase his p.o. fluid intake to at least 1 to 2 L daily and avoid bladder irritants such as caffeine products, spicy foods, and citrusy foods.     I recommend concomitant probiotics with treatment with antibiotics to protect the rectal reservoir including over-the-counter yogurt preparations to marcy oral pills containing the appropriate probiotics. Patient reports the diligent use of Probiotics.    He should also continue his Myrbetriq 50 mg daily as prescribed     Will see him back in 3 month for Follow up in clinic and recheck his urinalysis at that time.    Patient is agreeable to plan of care.    Patient reports that he is not currently experiencing any symptoms of urinary incontinence.    Patient's Body mass index is 33.23 kg/m². BMI is above normal parameters. Recommendations include: educational material, exercise counseling and nutrition counseling.    Smoking Cessation Counseling:  Current every day smoker. less than 3 minutes spent counseling. Has reduced tobacco use.  I advised patient to quit tobacco use and offered support.  I provided patient with tobacco cessation educational material printed in the patient's After Visit Summary.     Counseling was given to patient for the following topics:Diagnostic results including: Recurrent UTIs, dysuria, urine incontinence. Instructions for management as follows: Increase p.o. fluid intake, Myrbetriq 50 mg daily, Macrobid 100 mg daily, biotics. and risk factor reductions including: Smoking cessation, avoiding bladder irritants.. The interim medical history and current results were reviewed.    A treatment plan with follow-up was made for overactive bladder, chronic cystitis, Frequency of micturition [R35.0].         This document has been electronically signed by Griselda Cheng-Akwa, APRN February 9, 2021 17:23 EST

## 2021-02-09 NOTE — PATIENT INSTRUCTIONS
Urinary Tract Infection, Adult    A urinary tract infection (UTI) is an infection of any part of the urinary tract. The urinary tract includes the kidneys, ureters, bladder, and urethra. These organs make, store, and get rid of urine in the body.  Your health care provider may use other names to describe the infection. An upper UTI affects the ureters and kidneys (pyelonephritis). A lower UTI affects the bladder (cystitis) and urethra (urethritis).  What are the causes?  Most urinary tract infections are caused by bacteria in your genital area, around the entrance to your urinary tract (urethra). These bacteria grow and cause inflammation of your urinary tract.  What increases the risk?  You are more likely to develop this condition if:  · You have a urinary catheter that stays in place (indwelling).  · You are not able to control when you urinate or have a bowel movement (you have incontinence).  · You are female and you:  ? Use a spermicide or diaphragm for birth control.  ? Have low estrogen levels.  ? Are pregnant.  · You have certain genes that increase your risk (genetics).  · You are sexually active.  · You take antibiotic medicines.  · You have a condition that causes your flow of urine to slow down, such as:  ? An enlarged prostate, if you are male.  ? Blockage in your urethra (stricture).  ? A kidney stone.  ? A nerve condition that affects your bladder control (neurogenic bladder).  ? Not getting enough to drink, or not urinating often.  · You have certain medical conditions, such as:  ? Diabetes.  ? A weak disease-fighting system (immunesystem).  ? Sickle cell disease.  ? Gout.  ? Spinal cord injury.  What are the signs or symptoms?  Symptoms of this condition include:  · Needing to urinate right away (urgently).  · Frequent urination or passing small amounts of urine frequently.  · Pain or burning with urination.  · Blood in the urine.  · Urine that smells bad or unusual.  · Trouble urinating.  · Cloudy  urine.  · Vaginal discharge, if you are female.  · Pain in the abdomen or the lower back.  You may also have:  · Vomiting or a decreased appetite.  · Confusion.  · Irritability or tiredness.  · A fever.  · Diarrhea.  The first symptom in older adults may be confusion. In some cases, they may not have any symptoms until the infection has worsened.  How is this diagnosed?  This condition is diagnosed based on your medical history and a physical exam. You may also have other tests, including:  · Urine tests.  · Blood tests.  · Tests for sexually transmitted infections (STIs).  If you have had more than one UTI, a cystoscopy or imaging studies may be done to determine the cause of the infections.  How is this treated?  Treatment for this condition includes:  · Antibiotic medicine.  · Over-the-counter medicines to treat discomfort.  · Drinking enough water to stay hydrated.  If you have frequent infections or have other conditions such as a kidney stone, you may need to see a health care provider who specializes in the urinary tract (urologist).  In rare cases, urinary tract infections can cause sepsis. Sepsis is a life-threatening condition that occurs when the body responds to an infection. Sepsis is treated in the hospital with IV antibiotics, fluids, and other medicines.  Follow these instructions at home:    Medicines  · Take over-the-counter and prescription medicines only as told by your health care provider.  · If you were prescribed an antibiotic medicine, take it as told by your health care provider. Do not stop using the antibiotic even if you start to feel better.  General instructions  · Make sure you:  ? Empty your bladder often and completely. Do not hold urine for long periods of time.  ? Empty your bladder after sex.  ? Wipe from front to back after a bowel movement if you are female. Use each tissue one time when you wipe.  · Drink enough fluid to keep your urine pale yellow.  · Keep all follow-up  visits as told by your health care provider. This is important.  Contact a health care provider if:  · Your symptoms do not get better after 1-2 days.  · Your symptoms go away and then return.  Get help right away if you have:  · Severe pain in your back or your lower abdomen.  · A fever.  · Nausea or vomiting.  Summary  · A urinary tract infection (UTI) is an infection of any part of the urinary tract, which includes the kidneys, ureters, bladder, and urethra.  · Most urinary tract infections are caused by bacteria in your genital area, around the entrance to your urinary tract (urethra).  · Treatment for this condition often includes antibiotic medicines.  · If you were prescribed an antibiotic medicine, take it as told by your health care provider. Do not stop using the antibiotic even if you start to feel better.  · Keep all follow-up visits as told by your health care provider. This is important.  This information is not intended to replace advice given to you by your health care provider. Make sure you discuss any questions you have with your health care provider.  Document Revised: 12/05/2019 Document Reviewed: 06/27/2019  [employer] Patient Education © 2020 [employer]  Overactive Bladder, Adult    Overactive bladder refers to a condition in which a person has a sudden need to pass urine. The person may leak urine if he or she cannot get to the bathroom fast enough (urinary incontinence). A person with this condition may also wake up several times in the night to go to the bathroom.  Overactive bladder is associated with poor nerve signals between your bladder and your brain. Your bladder may get the signal to empty before it is full. You may also have very sensitive muscles that make your bladder squeeze too soon. These symptoms might interfere with daily work or social activities.  What are the causes?  This condition may be associated with or caused by:  · Urinary tract infection.  · Infection of nearby  tissues, such as the prostate.  · Prostate enlargement.  · Surgery on the uterus or urethra.  · Bladder stones, inflammation, or tumors.  · Drinking too much caffeine or alcohol.  · Certain medicines, especially medicines that get rid of extra fluid in the body (diuretics).  · Muscle or nerve weakness, especially from:  ? A spinal cord injury.  ? Stroke.  ? Multiple sclerosis.  ? Parkinson's disease.  · Diabetes.  · Constipation.  What increases the risk?  You may be at greater risk for overactive bladder if you:  · Are an older adult.  · Smoke.  · Are going through menopause.  · Have prostate problems.  · Have a neurological disease, such as stroke, dementia, Parkinson's disease, or multiple sclerosis (MS).  · Eat or drink things that irritate the bladder. These include alcohol, spicy food, and caffeine.  · Are overweight or obese.  What are the signs or symptoms?  Symptoms of this condition include:  · Sudden, strong urge to urinate.  · Leaking urine.  · Urinating 8 or more times a day.  · Waking up to urinate 2 or more times a night.  How is this diagnosed?  Your health care provider may suspect overactive bladder based on your symptoms. He or she will diagnose this condition by:  · A physical exam and medical history.  · Blood or urine tests. You might need bladder or urine tests to help determine what is causing your overactive bladder.  You might also need to see a health care provider who specializes in urinary tract problems (urologist).  How is this treated?  Treatment for overactive bladder depends on the cause of your condition and whether it is mild or severe. You can also make lifestyle changes at home. Options include:  · Bladder training. This may include:  ? Learning to control the urge to urinate by following a schedule that directs you to urinate at regular intervals (timed voiding).  ? Doing Kegel exercises to strengthen your pelvic floor muscles, which support your bladder. Toning these muscles  can help you control urination, even if your bladder muscles are overactive.  · Special devices. This may include:  ? Biofeedback, which uses sensors to help you become aware of your body's signals.  ? Electrical stimulation, which uses electrodes placed inside the body (implanted) or outside the body. These electrodes send gentle pulses of electricity to strengthen the nerves or muscles that control the bladder.  ? Women may use a plastic device that fits into the vagina and supports the bladder (pessary).  · Medicines.  ? Antibiotics to treat bladder infection.  ? Antispasmodics to stop the bladder from releasing urine at the wrong time.  ? Tricyclic antidepressants to relax bladder muscles.  ? Injections of botulinum toxin type A directly into the bladder tissue to relax bladder muscles.  · Lifestyle changes. This may include:  ? Weight loss. Talk to your health care provider about weight loss methods that would work best for you.  ? Diet changes. This may include reducing how much alcohol and caffeine you consume, or drinking fluids at different times of the day.  ? Not smoking. Do not use any products that contain nicotine or tobacco, such as cigarettes and e-cigarettes. If you need help quitting, ask your health care provider.  · Surgery.  ? A device may be implanted to help manage the nerve signals that control urination.  ? An electrode may be implanted to stimulate electrical signals in the bladder.  ? A procedure may be done to change the shape of the bladder. This is done only in very severe cases.  Follow these instructions at home:  Lifestyle  · Make any diet or lifestyle changes that are recommended by your health care provider. These may include:  ? Drinking less fluid or drinking fluids at different times of the day.  ? Cutting down on caffeine or alcohol.  ? Doing Kegel exercises.  ? Losing weight if needed.  ? Eating a healthy and balanced diet to prevent constipation. This may include:  § Eating  foods that are high in fiber, such as fresh fruits and vegetables, whole grains, and beans.  § Limiting foods that are high in fat and processed sugars, such as fried and sweet foods.  General instructions  · Take over-the-counter and prescription medicines only as told by your health care provider.  · If you were prescribed an antibiotic medicine, take it as told by your health care provider. Do not stop taking the antibiotic even if you start to feel better.  · Use any implants or pessary as told by your health care provider.  · If needed, wear pads to absorb urine leakage.  · Keep a journal or log to track how much and when you drink and when you feel the need to urinate. This will help your health care provider monitor your condition.  · Keep all follow-up visits as told by your health care provider. This is important.  Contact a health care provider if:  · You have a fever.  · Your symptoms do not get better with treatment.  · Your pain and discomfort get worse.  · You have more frequent urges to urinate.  Get help right away if:  · You are not able to control your bladder.  Summary  · Overactive bladder refers to a condition in which a person has a sudden need to pass urine.  · Several conditions may lead to an overactive bladder.  · Treatment for overactive bladder depends on the cause and severity of your condition.  · Follow your health care provider's instructions about lifestyle changes, doing Kegel exercises, keeping a journal, and taking medicines.  This information is not intended to replace advice given to you by your health care provider. Make sure you discuss any questions you have with your health care provider.  Document Revised: 04/09/2020 Document Reviewed: 01/03/2019  [employer] Patient Education © 2020 [employer]

## 2021-02-10 LAB — BACTERIA SPEC AEROBE CULT: NO GROWTH

## 2021-02-11 ENCOUNTER — TELEPHONE (OUTPATIENT)
Dept: UROLOGY | Facility: CLINIC | Age: 66
End: 2021-02-11

## 2021-02-11 NOTE — TELEPHONE ENCOUNTER
Urine Culture No growth                  CALLED PATIENT TO CHECK ON HIM POST CLINIC VISIT, AND TO DISCUSS HIS URINE CULTURE RESULTS CAME BACK NEGATIVE.CONT. ABX SUPPRESSIVE THERAPY WITH MACROBID, AND FOLLOW UP AS DISCUSSED.

## 2021-02-19 ENCOUNTER — PRIOR AUTHORIZATION (OUTPATIENT)
Dept: UROLOGY | Facility: CLINIC | Age: 66
End: 2021-02-19

## 2021-02-22 DIAGNOSIS — Z23 IMMUNIZATION DUE: ICD-10-CM

## 2021-03-11 ENCOUNTER — OFFICE VISIT (OUTPATIENT)
Dept: CARDIOLOGY | Facility: CLINIC | Age: 66
End: 2021-03-11

## 2021-03-11 VITALS
WEIGHT: 242 LBS | BODY MASS INDEX: 32.78 KG/M2 | HEIGHT: 72 IN | SYSTOLIC BLOOD PRESSURE: 130 MMHG | OXYGEN SATURATION: 98 % | DIASTOLIC BLOOD PRESSURE: 62 MMHG | HEART RATE: 75 BPM | TEMPERATURE: 96.8 F | RESPIRATION RATE: 16 BRPM

## 2021-03-11 DIAGNOSIS — E78.2 MIXED HYPERLIPIDEMIA: ICD-10-CM

## 2021-03-11 DIAGNOSIS — I10 ESSENTIAL HYPERTENSION: ICD-10-CM

## 2021-03-11 DIAGNOSIS — Z72.0 TOBACCO USE: ICD-10-CM

## 2021-03-11 DIAGNOSIS — I25.10 CORONARY ARTERY DISEASE INVOLVING NATIVE CORONARY ARTERY OF NATIVE HEART WITHOUT ANGINA PECTORIS: Primary | ICD-10-CM

## 2021-03-11 PROCEDURE — 99214 OFFICE O/P EST MOD 30 MIN: CPT | Performed by: INTERNAL MEDICINE

## 2021-03-11 NOTE — PROGRESS NOTES
Subjective   Chief Complaint   Patient presents with   • Coronary Artery Disease     Patient has no chest pain   • Hypertension     follow up   • Hyperlipidemia     follow up       History of Present Illness  Mr. Baig is 65 years old white male who is here for cardiology follow-up.  He has known coronary artery disease with inferior wall myocardial infarction with multiple stents.  He denies any chest pain palpitations or shortness of breath.    He is taking his medications regularly including aspirin, beta-blocker therapy and statin therapy with Crestor.    Crestor was started last visit he is tolerating it very well but he has not had any lab work done.  He was also not taking any beta-blocker therapy which was just started last visit  He is tolerating medications very well without any side effects.  Blood pressure is very well controlled.  He denies any dizziness syncope or near syncope.  No orthopnea PND or ankle edema no chest pain.    Past Surgical History:   Procedure Laterality Date   • CARDIAC CATHETERIZATION  03/03/2014   • CARDIAC SURGERY     • CARDIOVASCULAR STRESS TEST  04/04/2013   • CATARACT EXTRACTION     • CEREBRAL ANEURYSM REPAIR     • COLONOSCOPY     • CORONARY ANGIOPLASTY WITH STENT PLACEMENT     • CYSTOSCOPY LITHOLAPAXY BLADDER STONE EXTRACTION N/A 11/29/2017    Procedure: CYSTOSCOPY LITHOLAPAXY BLADDER STONE EXTRACTION;  Surgeon: Flavio Barrios MD;  Location: [facility];  Service:    • ECHO - CONVERTED  02/02/2014   • EYE SURGERY     • INNER EAR SURGERY     • SKIN BIOPSY     • TONSILLECTOMY     • URETHRAL DILATATION N/A 6/1/2020    Procedure: CYSTOSCOPY URETHRAL DILATATION;  Surgeon: Flavio Barrios MD;  Location: [facility];  Service: Urology;  Laterality: N/A;     Family History   Problem Relation Age of Onset   • Osteoporosis Mother    • Cancer Mother         breast and lung   • Cancer Father         small cell cancer, kidney   • Heart disease Father    • Diabetes Father    •  No Known Problems Sister    • Aneurysm Brother      Past Medical History:   Diagnosis Date   • Arthritis    • Bladder stones    • BPH (benign prostatic hyperplasia)    • Brain aneurysm     1987   • Brain aneurysm 1987   • Cancer (CMS/Formerly [facility])     skin ca- felix   • Claustrophobia    • COPD (chronic obstructive pulmonary disease) (CMS/[facility]ly [facility])    • Coronary artery disease    • Diabetes (CMS/[facility]ly [facility])    • Difficulty urinating    • Fracture, finger    • GERD (gastroesophageal reflux disease)    • Hematuria    • High cholesterol    • History of sepsis    • Hypertension    • MI (myocardial infarction) (CMS/Formerly [facility])     2013   • Neuropathy     FEET AND LEGS   • Prostate disease    • Stroke (CMS/Formerly [facility])     X3 last one 9/2016   TIA   • TIA (transient ischemic attack)    • Unsteady gait    • UTI (urinary tract infection)        Patient Active Problem List   Diagnosis   • Left wrist pain   • Diabetes (CMS/[facility]ly [facility])   • Prostate disease   • COPD (chronic obstructive pulmonary disease) (CMS/[facility]ly [facility])   • History of TIA (transient ischemic attack)   • Ganglion cyst of wrist   • Frequency of micturition   • Claustrophobia   • Blood in the urine   • Incomplete bladder emptying   • Coronary artery disease, inferior wall myocardial infarction 2013, bare metal stent to RCA, drug-eluting stent ×2 to LAD and balloon angioplasty of the medial trunk of the LAD diagonal 2014   • Hypertension   • Hyperlipidemia   • Tobacco use   • Gross hematuria   • Urinary retention   • Bladder stones   • Surgical aftercare, genitourinary system   • Cutaneous abscess of neck   • Acute cystitis without hematuria   • Dysuria   • Urethral stricture due to infection   • Urinary incontinence without sensory awareness   • Urinary tract infection without hematuria   • Chronic cystitis         Social History     Tobacco Use   • Smoking status: Current Every Day Smoker     Packs/day: 1.00     Years: 45.00     Pack years: 45.00     Types: Cigarettes   • Smokeless tobacco: Never Used    Substance Use Topics   • Alcohol use: No   • Drug use: No         The following portions of the patient's history were reviewed and updated as appropriate: allergies, current medications, past family history, past medical history, past social history, past surgical history and problem list.    No Known Allergies      Current Outpatient Medications:   •  Alcohol Swabs (B-D SINGLE USE SWABS REGULAR) pads, , Disp: , Rfl:   •  aspirin  MG tablet, Take 325 mg by mouth Every 6 (Six) Hours As Needed., Disp: , Rfl:   •  Blood Glucose Monitoring Suppl (ACCU-CHEK JOSE G) device, Use as directed to test blood sugar three times daily and and bedtime., Disp: 1 each, Rfl: 0  •  cyclobenzaprine (FLEXERIL) 10 MG tablet, 2 (two) times a day., Disp: , Rfl:   •  diclofenac (VOLTAREN) 75 MG EC tablet, Take 1 tablet by mouth 2 (Two) Times a Day., Disp: 30 tablet, Rfl: 0  •  doxazosin (CARDURA) 8 MG tablet, Take 8 mg by mouth Every Morning., Disp: , Rfl:   •  finasteride (PROSCAR) 5 MG tablet, Take 1 tablet by mouth Daily., Disp: 30 tablet, Rfl: 5  •  gabapentin (NEURONTIN) 600 MG tablet, 600 mg 3 (Three) Times a Day., Disp: , Rfl:   •  glimepiride (AMARYL) 4 MG tablet, Take 4 mg by mouth Every Morning Before Breakfast., Disp: , Rfl:   •  glucose blood test strip, Use as directed to test blood sugar three times daily and and bedtime., Disp: 120 each, Rfl: 0  •  losartan (COZAAR) 50 MG tablet, Take 1 tablet by mouth Daily., Disp: 90 tablet, Rfl: 1  •  meloxicam (MOBIC) 7.5 MG tablet, 2 (two) times a day., Disp: , Rfl:   •  metFORMIN (GLUCOPHAGE) 500 MG tablet, Take 500 mg by mouth 2 (Two) Times a Day With Meals., Disp: , Rfl:   •  metoprolol succinate XL (TOPROL-XL) 25 MG 24 hr tablet, Take 0.5 tablets by mouth Daily., Disp: 45 tablet, Rfl: 1  •  Mirabegron ER (Myrbetriq) 50 MG tablet sustained-release 24 hour 24 hr tablet, Take 50 mg by mouth Daily., Disp: 30 tablet, Rfl: 3  •  nitrofurantoin, macrocrystal-monohydrate, (Macrobid)  "100 MG capsule, Take 1 capsule by mouth Daily., Disp: 56 capsule, Rfl: 5  •  nitroglycerin (NITROSTAT) 0.4 MG SL tablet, 1 under the tongue as needed for angina, may repeat q5mins for up three doses, Disp: 30 tablet, Rfl: 1  •  pantoprazole (PROTONIX) 40 MG EC tablet, Take 40 mg by mouth daily., Disp: , Rfl:   •  rosuvastatin (CRESTOR) 5 MG tablet, Take 1 tablet by mouth Daily., Disp: 90 tablet, Rfl: 1  •  tamsulosin (FLOMAX) 0.4 MG capsule 24 hr capsule, Take 1 capsule by mouth Every Night., Disp: 90 capsule, Rfl: 4  •  traMADol (ULTRAM) 50 MG tablet, , Disp: , Rfl:   •  TRUEPLUS LANCETS 33G misc, Use as directed to test blood sugar three times daily and and bedtime., Disp: 120 each, Rfl: 0  •  zolpidem (AMBIEN) 10 MG tablet, Take 10 mg by mouth at night as needed., Disp: , Rfl:     Review of Systems   Constitutional: Negative.   HENT: Negative.  Negative for congestion.    Eyes: Negative.    Cardiovascular: Negative.  Negative for chest pain, cyanosis, dyspnea on exertion, irregular heartbeat, leg swelling, near-syncope, orthopnea, palpitations, paroxysmal nocturnal dyspnea and syncope.   Respiratory: Negative.  Negative for shortness of breath.    Hematologic/Lymphatic: Negative.    Musculoskeletal: Negative.    Gastrointestinal: Negative.    Neurological: Negative.  Negative for headaches.        Objective      /62 (BP Location: Left arm, Patient Position: Sitting, Cuff Size: Adult)   Pulse 75   Temp 96.8 °F (36 °C) (Temporal)   Resp 16   Ht 182.9 cm (72.01\")   Wt 110 kg (242 lb)   SpO2 98%   BMI 32.81 kg/m²     Vitals and nursing note reviewed.   Cardiovascular:      PMI at left midclavicular line. Normal rate. Regular rhythm. Normal S1. Normal S2.      Murmurs: There is no murmur.      No gallop. No click. No rub.   Pulses:     Intact distal pulses.   Edema:     Peripheral edema absent.         Lab Review:    CBC, CMP, lipid panel and CK scheduled.  Procedures    I reviewed the patient's new " clinical results.  I personally viewed and interpreted the patient's EKG/lab data        Assessment:   Diagnosis Plan   1. Coronary artery disease involving native coronary artery of native heart without angina pectoris     2. Essential hypertension     3. Mixed hyperlipidemia  CBC & Differential    CK    Comprehensive Metabolic Panel    Lipid Panel   4. Tobacco use            Plan:  Patient has known coronary artery disease he was recently started back on beta-blocker therapy and statin therapy with Crestor.  He is tolerating medication very well he denies any chest pain palpitations or shortness of breath.    He is overweight and has not been able to lose much weight he is trying to diet.  Lab work including CBC, CMP and lipid panel was scheduled.    Patient has not had any cardiac work-up in quite a while, stress test and echocardiogram was suggested but patient declined at this time.  He is currently asymptomatic and will consider that at next visit.  Cessation of tobacco use stressed.  Healthy lifestyle emphasized    Thank you for giving me the oppertunity to participate in your patient's cardiac care.    Sincerely,    MARIA E Tejada M.D. FACP FACC     No follow-ups on file.

## 2021-04-13 ENCOUNTER — APPOINTMENT (OUTPATIENT)
Dept: GENERAL RADIOLOGY | Facility: HOSPITAL | Age: 66
End: 2021-04-13

## 2021-04-13 ENCOUNTER — APPOINTMENT (OUTPATIENT)
Dept: CT IMAGING | Facility: HOSPITAL | Age: 66
End: 2021-04-13

## 2021-04-13 ENCOUNTER — HOSPITAL ENCOUNTER (OUTPATIENT)
Facility: HOSPITAL | Age: 66
Setting detail: OBSERVATION
Discharge: LEFT BEFORE DISCHARGE | End: 2021-04-14
Attending: STUDENT IN AN ORGANIZED HEALTH CARE EDUCATION/TRAINING PROGRAM | Admitting: INTERNAL MEDICINE

## 2021-04-13 DIAGNOSIS — R07.9 CHEST PAIN IN ADULT: Primary | ICD-10-CM

## 2021-04-13 LAB
ALBUMIN SERPL-MCNC: 4.05 G/DL (ref 3.5–5.2)
ALBUMIN/GLOB SERPL: 1.2 G/DL
ALP SERPL-CCNC: 113 U/L (ref 39–117)
ALT SERPL W P-5'-P-CCNC: 6 U/L (ref 1–41)
ANION GAP SERPL CALCULATED.3IONS-SCNC: 9.9 MMOL/L (ref 5–15)
AST SERPL-CCNC: 12 U/L (ref 1–40)
BASOPHILS # BLD AUTO: 0.04 10*3/MM3 (ref 0–0.2)
BASOPHILS NFR BLD AUTO: 0.5 % (ref 0–1.5)
BILIRUB SERPL-MCNC: 0.2 MG/DL (ref 0–1.2)
BUN SERPL-MCNC: 8 MG/DL (ref 8–23)
BUN/CREAT SERPL: 6.1 (ref 7–25)
CALCIUM SPEC-SCNC: 8.9 MG/DL (ref 8.6–10.5)
CHLORIDE SERPL-SCNC: 99 MMOL/L (ref 98–107)
CO2 SERPL-SCNC: 20.1 MMOL/L (ref 22–29)
CREAT SERPL-MCNC: 1.31 MG/DL (ref 0.76–1.27)
D DIMER PPP FEU-MCNC: 0.92 MCGFEU/ML (ref 0–0.5)
DEPRECATED RDW RBC AUTO: 50.9 FL (ref 37–54)
EOSINOPHIL # BLD AUTO: 0.18 10*3/MM3 (ref 0–0.4)
EOSINOPHIL NFR BLD AUTO: 2.2 % (ref 0.3–6.2)
ERYTHROCYTE [DISTWIDTH] IN BLOOD BY AUTOMATED COUNT: 18.4 % (ref 12.3–15.4)
FLUAV RNA RESP QL NAA+PROBE: NOT DETECTED
FLUBV RNA RESP QL NAA+PROBE: NOT DETECTED
GFR SERPL CREATININE-BSD FRML MDRD: 55 ML/MIN/1.73
GLOBULIN UR ELPH-MCNC: 3.4 GM/DL
GLUCOSE BLDC GLUCOMTR-MCNC: 102 MG/DL (ref 70–130)
GLUCOSE SERPL-MCNC: 146 MG/DL (ref 65–99)
HCT VFR BLD AUTO: 29.3 % (ref 37.5–51)
HGB BLD-MCNC: 9.1 G/DL (ref 13–17.7)
HOLD SPECIMEN: NORMAL
HOLD SPECIMEN: NORMAL
IMM GRANULOCYTES # BLD AUTO: 0.05 10*3/MM3 (ref 0–0.05)
IMM GRANULOCYTES NFR BLD AUTO: 0.6 % (ref 0–0.5)
LIPASE SERPL-CCNC: 19 U/L (ref 13–60)
LYMPHOCYTES # BLD AUTO: 2.41 10*3/MM3 (ref 0.7–3.1)
LYMPHOCYTES NFR BLD AUTO: 29.4 % (ref 19.6–45.3)
MCH RBC QN AUTO: 23.8 PG (ref 26.6–33)
MCHC RBC AUTO-ENTMCNC: 31.1 G/DL (ref 31.5–35.7)
MCV RBC AUTO: 76.5 FL (ref 79–97)
MONOCYTES # BLD AUTO: 0.73 10*3/MM3 (ref 0.1–0.9)
MONOCYTES NFR BLD AUTO: 8.9 % (ref 5–12)
NEUTROPHILS NFR BLD AUTO: 4.8 10*3/MM3 (ref 1.7–7)
NEUTROPHILS NFR BLD AUTO: 58.4 % (ref 42.7–76)
NRBC BLD AUTO-RTO: 0 /100 WBC (ref 0–0.2)
PLATELET # BLD AUTO: 217 10*3/MM3 (ref 140–450)
PMV BLD AUTO: 9.8 FL (ref 6–12)
POTASSIUM SERPL-SCNC: 4.7 MMOL/L (ref 3.5–5.2)
PROT SERPL-MCNC: 7.4 G/DL (ref 6–8.5)
RBC # BLD AUTO: 3.83 10*6/MM3 (ref 4.14–5.8)
SARS-COV-2 RNA RESP QL NAA+PROBE: NOT DETECTED
SODIUM SERPL-SCNC: 129 MMOL/L (ref 136–145)
TROPONIN T SERPL-MCNC: 0.02 NG/ML (ref 0–0.03)
TROPONIN T SERPL-MCNC: <0.01 NG/ML (ref 0–0.03)
TROPONIN T SERPL-MCNC: <0.01 NG/ML (ref 0–0.03)
WBC # BLD AUTO: 8.21 10*3/MM3 (ref 3.4–10.8)
WHOLE BLOOD HOLD SPECIMEN: NORMAL
WHOLE BLOOD HOLD SPECIMEN: NORMAL

## 2021-04-13 PROCEDURE — G0378 HOSPITAL OBSERVATION PER HR: HCPCS

## 2021-04-13 PROCEDURE — 80053 COMPREHEN METABOLIC PANEL: CPT | Performed by: PHYSICIAN ASSISTANT

## 2021-04-13 PROCEDURE — 83690 ASSAY OF LIPASE: CPT | Performed by: PHYSICIAN ASSISTANT

## 2021-04-13 PROCEDURE — 70450 CT HEAD/BRAIN W/O DYE: CPT

## 2021-04-13 PROCEDURE — 71275 CT ANGIOGRAPHY CHEST: CPT

## 2021-04-13 PROCEDURE — 84484 ASSAY OF TROPONIN QUANT: CPT | Performed by: PHYSICIAN ASSISTANT

## 2021-04-13 PROCEDURE — 93010 ELECTROCARDIOGRAM REPORT: CPT | Performed by: INTERNAL MEDICINE

## 2021-04-13 PROCEDURE — C9803 HOPD COVID-19 SPEC COLLECT: HCPCS

## 2021-04-13 PROCEDURE — 99220 PR INITIAL OBSERVATION CARE/DAY 70 MINUTES: CPT | Performed by: PHYSICIAN ASSISTANT

## 2021-04-13 PROCEDURE — 87636 SARSCOV2 & INF A&B AMP PRB: CPT | Performed by: INTERNAL MEDICINE

## 2021-04-13 PROCEDURE — 96374 THER/PROPH/DIAG INJ IV PUSH: CPT

## 2021-04-13 PROCEDURE — 25010000002 MORPHINE PER 10 MG: Performed by: STUDENT IN AN ORGANIZED HEALTH CARE EDUCATION/TRAINING PROGRAM

## 2021-04-13 PROCEDURE — 82962 GLUCOSE BLOOD TEST: CPT

## 2021-04-13 PROCEDURE — 99285 EMERGENCY DEPT VISIT HI MDM: CPT

## 2021-04-13 PROCEDURE — 96375 TX/PRO/DX INJ NEW DRUG ADDON: CPT

## 2021-04-13 PROCEDURE — 71045 X-RAY EXAM CHEST 1 VIEW: CPT

## 2021-04-13 PROCEDURE — 85025 COMPLETE CBC W/AUTO DIFF WBC: CPT | Performed by: PHYSICIAN ASSISTANT

## 2021-04-13 PROCEDURE — 25010000002 ONDANSETRON PER 1 MG: Performed by: STUDENT IN AN ORGANIZED HEALTH CARE EDUCATION/TRAINING PROGRAM

## 2021-04-13 PROCEDURE — 85379 FIBRIN DEGRADATION QUANT: CPT | Performed by: PHYSICIAN ASSISTANT

## 2021-04-13 PROCEDURE — 70450 CT HEAD/BRAIN W/O DYE: CPT | Performed by: RADIOLOGY

## 2021-04-13 PROCEDURE — 93005 ELECTROCARDIOGRAM TRACING: CPT | Performed by: PHYSICIAN ASSISTANT

## 2021-04-13 PROCEDURE — 71275 CT ANGIOGRAPHY CHEST: CPT | Performed by: RADIOLOGY

## 2021-04-13 PROCEDURE — 93005 ELECTROCARDIOGRAM TRACING: CPT | Performed by: STUDENT IN AN ORGANIZED HEALTH CARE EDUCATION/TRAINING PROGRAM

## 2021-04-13 PROCEDURE — 71045 X-RAY EXAM CHEST 1 VIEW: CPT | Performed by: RADIOLOGY

## 2021-04-13 PROCEDURE — 0 IOPAMIDOL PER 1 ML: Performed by: STUDENT IN AN ORGANIZED HEALTH CARE EDUCATION/TRAINING PROGRAM

## 2021-04-13 RX ORDER — DEXTROSE MONOHYDRATE 25 G/50ML
25 INJECTION, SOLUTION INTRAVENOUS
Status: DISCONTINUED | OUTPATIENT
Start: 2021-04-13 | End: 2021-04-14 | Stop reason: HOSPADM

## 2021-04-13 RX ORDER — LOSARTAN POTASSIUM 50 MG/1
50 TABLET ORAL DAILY
Status: CANCELLED | OUTPATIENT
Start: 2021-04-14

## 2021-04-13 RX ORDER — ASPIRIN 81 MG/1
81 TABLET, CHEWABLE ORAL DAILY
Status: DISCONTINUED | OUTPATIENT
Start: 2021-04-14 | End: 2021-04-14 | Stop reason: HOSPADM

## 2021-04-13 RX ORDER — TERAZOSIN 5 MG/1
5 CAPSULE ORAL NIGHTLY
Status: CANCELLED | OUTPATIENT
Start: 2021-04-13

## 2021-04-13 RX ORDER — NICOTINE POLACRILEX 4 MG
15 LOZENGE BUCCAL
Status: DISCONTINUED | OUTPATIENT
Start: 2021-04-13 | End: 2021-04-14 | Stop reason: HOSPADM

## 2021-04-13 RX ORDER — SODIUM CHLORIDE 0.9 % (FLUSH) 0.9 %
10 SYRINGE (ML) INJECTION EVERY 12 HOURS SCHEDULED
Status: DISCONTINUED | OUTPATIENT
Start: 2021-04-13 | End: 2021-04-14 | Stop reason: HOSPADM

## 2021-04-13 RX ORDER — ROSUVASTATIN CALCIUM 10 MG/1
5 TABLET, COATED ORAL DAILY
Status: DISCONTINUED | OUTPATIENT
Start: 2021-04-14 | End: 2021-04-14 | Stop reason: HOSPADM

## 2021-04-13 RX ORDER — TRAMADOL HYDROCHLORIDE 50 MG/1
100 TABLET ORAL NIGHTLY
Status: CANCELLED | OUTPATIENT
Start: 2021-04-13

## 2021-04-13 RX ORDER — DICLOFENAC SODIUM 75 MG/1
75 TABLET, DELAYED RELEASE ORAL 2 TIMES DAILY
COMMUNITY

## 2021-04-13 RX ORDER — NITROGLYCERIN 0.4 MG/1
0.4 TABLET SUBLINGUAL
Status: DISCONTINUED | OUTPATIENT
Start: 2021-04-13 | End: 2021-04-14 | Stop reason: HOSPADM

## 2021-04-13 RX ORDER — ACETAMINOPHEN 325 MG/1
650 TABLET ORAL EVERY 4 HOURS PRN
Status: DISCONTINUED | OUTPATIENT
Start: 2021-04-13 | End: 2021-04-14 | Stop reason: HOSPADM

## 2021-04-13 RX ORDER — SODIUM CHLORIDE 0.9 % (FLUSH) 0.9 %
10 SYRINGE (ML) INJECTION AS NEEDED
Status: DISCONTINUED | OUTPATIENT
Start: 2021-04-13 | End: 2021-04-14 | Stop reason: HOSPADM

## 2021-04-13 RX ORDER — OXYBUTYNIN CHLORIDE 5 MG/1
10 TABLET, EXTENDED RELEASE ORAL DAILY
Status: CANCELLED | OUTPATIENT
Start: 2021-04-14

## 2021-04-13 RX ORDER — NITROFURANTOIN 25; 75 MG/1; MG/1
100 CAPSULE ORAL DAILY
COMMUNITY
End: 2021-06-01 | Stop reason: SDUPTHER

## 2021-04-13 RX ORDER — METOPROLOL SUCCINATE 25 MG/1
25 TABLET, EXTENDED RELEASE ORAL 2 TIMES DAILY
COMMUNITY

## 2021-04-13 RX ORDER — METOPROLOL SUCCINATE 25 MG/1
25 TABLET, EXTENDED RELEASE ORAL EVERY 12 HOURS SCHEDULED
Status: CANCELLED | OUTPATIENT
Start: 2021-04-13

## 2021-04-13 RX ORDER — GABAPENTIN 600 MG/1
600 TABLET ORAL 4 TIMES DAILY
Status: CANCELLED | OUTPATIENT
Start: 2021-04-13

## 2021-04-13 RX ORDER — HYDRALAZINE HYDROCHLORIDE 20 MG/ML
10 INJECTION INTRAMUSCULAR; INTRAVENOUS EVERY 6 HOURS PRN
Status: DISCONTINUED | OUTPATIENT
Start: 2021-04-13 | End: 2021-04-14 | Stop reason: HOSPADM

## 2021-04-13 RX ORDER — ZOLPIDEM TARTRATE 5 MG/1
10 TABLET ORAL NIGHTLY PRN
Status: CANCELLED | OUTPATIENT
Start: 2021-04-13

## 2021-04-13 RX ORDER — NAPROXEN 250 MG/1
500 TABLET ORAL 2 TIMES DAILY WITH MEALS
Status: CANCELLED | OUTPATIENT
Start: 2021-04-13

## 2021-04-13 RX ORDER — CALCIUM CARBONATE 200(500)MG
2 TABLET,CHEWABLE ORAL 2 TIMES DAILY PRN
Status: DISCONTINUED | OUTPATIENT
Start: 2021-04-13 | End: 2021-04-14 | Stop reason: HOSPADM

## 2021-04-13 RX ORDER — PANTOPRAZOLE SODIUM 40 MG/1
40 TABLET, DELAYED RELEASE ORAL DAILY
Status: DISCONTINUED | OUTPATIENT
Start: 2021-04-14 | End: 2021-04-14 | Stop reason: HOSPADM

## 2021-04-13 RX ORDER — ASPIRIN 81 MG/1
324 TABLET, CHEWABLE ORAL ONCE
Status: COMPLETED | OUTPATIENT
Start: 2021-04-13 | End: 2021-04-13

## 2021-04-13 RX ORDER — SODIUM CHLORIDE 9 MG/ML
75 INJECTION, SOLUTION INTRAVENOUS CONTINUOUS
Status: CANCELLED | OUTPATIENT
Start: 2021-04-13

## 2021-04-13 RX ORDER — NITROFURANTOIN 25; 75 MG/1; MG/1
100 CAPSULE ORAL DAILY
Status: CANCELLED | OUTPATIENT
Start: 2021-04-14 | End: 2021-05-14

## 2021-04-13 RX ORDER — FINASTERIDE 5 MG/1
5 TABLET, FILM COATED ORAL DAILY
Status: CANCELLED | OUTPATIENT
Start: 2021-04-14

## 2021-04-13 RX ORDER — ONDANSETRON 2 MG/ML
4 INJECTION INTRAMUSCULAR; INTRAVENOUS ONCE
Status: COMPLETED | OUTPATIENT
Start: 2021-04-13 | End: 2021-04-13

## 2021-04-13 RX ORDER — NICOTINE 21 MG/24HR
1 PATCH, TRANSDERMAL 24 HOURS TRANSDERMAL
Status: DISCONTINUED | OUTPATIENT
Start: 2021-04-14 | End: 2021-04-14 | Stop reason: HOSPADM

## 2021-04-13 RX ADMIN — ASPIRIN 324 MG: 81 TABLET, CHEWABLE ORAL at 13:08

## 2021-04-13 RX ADMIN — SODIUM CHLORIDE 1000 ML: 9 INJECTION, SOLUTION INTRAVENOUS at 13:55

## 2021-04-13 RX ADMIN — NITROGLYCERIN 1 INCH: 20 OINTMENT TOPICAL at 13:08

## 2021-04-13 RX ADMIN — IOPAMIDOL 80 ML: 755 INJECTION, SOLUTION INTRAVENOUS at 15:14

## 2021-04-13 RX ADMIN — ONDANSETRON 4 MG: 2 INJECTION INTRAMUSCULAR; INTRAVENOUS at 13:08

## 2021-04-13 RX ADMIN — MORPHINE SULFATE 4 MG: 4 INJECTION, SOLUTION INTRAMUSCULAR; INTRAVENOUS at 13:08

## 2021-04-13 NOTE — H&P
"     [facility] Medicine Services  HISTORY & PHYSICAL    Patient Identification:  Name:  Jovanni Richter  Age:  65 y.o.  Sex:  male  :  1955  MRN:  [id number]   Visit Number:  [id number]  Admit Date: 2021   Primary Care Physician:  Nathan Espinal MD     Subjective     Chief complaint:   Chief Complaint   Patient presents with   • Chest Pain     History of presenting illness:   Patient is a 65 y.o. male with past medical history significant for coronary artery disease s/p stenting in past, essential hypertensin, hyperlipidemia, hx of TIA, history of cerebral aneurysm repair, COPD, non insulin dependent type II diabetes mellitus, GERD, BPH, frequent UTI/overactive bladder, ongoing tobacco abuse, and obesity that presented to the [facility] emergency department for evaluation of chest pain. Patient states that about 1-2 hours PTA, he started to not feel well. He states that he had been awake for several hours. He states he had sudden onset of light headedness, dizziness and nausea. He states that he the developed substernal chest pressure and had emesis episode. Patient states that prior to this, he was at his baseline state of health. He denies any aggravators. He does state feeling some better after emesis. However, due to persistent chest pressure and his symptoms being similar to previous heart attack, he came in to ED for evaluation. He reports being chronically SOA, unchanged from baseline. Does not wear home O2. He does reports lower extremity edema, but this is chronic and not acutely worsened from baseline. He is on aspirin and statin, history of stenting in the past. He has a significant smoking history, 1 ppd x 53 years. He denies any fevers but does report chills but states that he always has chills and \"runs cold\". He denies any abdominal pain or change in bowel movements. Denies any headaches, vision changes, or of consciousness.  No lateralizing weakness, no " "facial asymmetry, no slurred speech.  Patient reports chronic peripheral neuropathy bilateral lower extremities, with no acute worsening.  Upon further questioning, patient denies alcohol abuse.  He does report drinking 5-6 8 ounce glasses of tea per day, approximately 5 cups of coffee every morning, with an additional 3 to 4 8 ounce glasses of water.    Upon arrival to the ED, vitals were temperature 97.2, HR 72, RR 18, /87, and oxygen saturation 98% on room air. Initial troponin T negative, repeat at 0.021. CMP with glucose 146 sodium 129, CO2 20.1, creatinine 1.31, EGFR 55.  Lipase 19.  D-dimer positive at 0.92.  CBC with hemoglobin 9.1, hematocrit 29.3, MCV 76.5.  Chest x-ray with no evidence of acute cardiopulmonary disease.  CT PE protocol negative for PE and positive for COPD changes.  CT head without contrast with no evidence of acute intracranial abnormality, cerebral atrophy noted.  COVID-19 and influenza screening pending.  While in the emergency department, patient was administered 324 mg p.o. aspirin, 4 mg IV morphine, 1 inch Nitrostat ointment, 4 mg IV Zofran, and a 1 L bolus normal saline.    Patient has been admitted under observation status for further evaluation and treatment.     Present during exam: Significant other  ---------------------------------------------------------------------------------------------------------------------   Review of Systems   Constitutional: Positive for chills (Chronically ) and fatigue. Negative for activity change, appetite change, diaphoresis and fever.   HENT: Negative for congestion, postnasal drip, rhinorrhea, sinus pain, sore throat and trouble swallowing.    Eyes: Negative for discharge and visual disturbance.   Respiratory: Positive for shortness of breath (Chronically). Negative for cough, chest tightness and wheezing.    Cardiovascular: Positive for chest pain (\"pressure\") and leg swelling (Chronically). Negative for palpitations. "   Gastrointestinal: Positive for nausea and vomiting. Negative for abdominal pain, constipation and diarrhea.   Endocrine: Negative for cold intolerance and heat intolerance.   Genitourinary: Negative for decreased urine volume, dysuria, frequency and urgency.   Musculoskeletal: Negative for arthralgias, gait problem and myalgias.   Skin: Negative for color change, rash and wound.   Allergic/Immunologic: Negative for environmental allergies and immunocompromised state.   Neurological: Positive for dizziness, weakness and light-headedness. Negative for syncope and headaches.   Hematological: Negative for adenopathy. Does not bruise/bleed easily.   Psychiatric/Behavioral: Negative for confusion and decreased concentration. The patient is not nervous/anxious.       ---------------------------------------------------------------------------------------------------------------------   Past Medical History:   Diagnosis Date   • Arthritis    • Bladder stones    • BPH (benign prostatic hyperplasia)    • Brain aneurysm     1987   • Brain aneurysm 1987   • Cancer (CMS/[facility])     skin ca- felix   • Claustrophobia    • COPD (chronic obstructive pulmonary disease) (CMS/[facility])    • Coronary artery disease    • Diabetes (CMS/[facility])    • Difficulty urinating    • Fracture, finger    • GERD (gastroesophageal reflux disease)    • Hematuria    • High cholesterol    • History of sepsis    • Hypertension    • MI (myocardial infarction) (CMS/[facility])     2013   • Neuropathy     FEET AND LEGS   • Prostate disease    • Stroke (CMS/[facility])     X3 last one 9/2016   TIA   • TIA (transient ischemic attack)    • Unsteady gait    • UTI (urinary tract infection)      Past Surgical History:   Procedure Laterality Date   • CARDIAC CATHETERIZATION  03/03/2014   • CARDIAC SURGERY     • CARDIOVASCULAR STRESS TEST  04/04/2013   • CATARACT EXTRACTION     • CEREBRAL ANEURYSM REPAIR     • COLONOSCOPY     • CORONARY ANGIOPLASTY WITH STENT PLACEMENT     • CYSTOSCOPY  LITHOLAPAXY BLADDER STONE EXTRACTION N/A 11/29/2017    Procedure: CYSTOSCOPY LITHOLAPAXY BLADDER STONE EXTRACTION;  Surgeon: Flavio Barrios MD;  Location: [facility];  Service:    • ECHO - CONVERTED  02/02/2014   • EYE SURGERY     • INNER EAR SURGERY     • SKIN BIOPSY     • TONSILLECTOMY     • URETHRAL DILATATION N/A 6/1/2020    Procedure: CYSTOSCOPY URETHRAL DILATATION;  Surgeon: Flavio Barrios MD;  Location: [facility];  Service: Urology;  Laterality: N/A;     Family History   Problem Relation Age of Onset   • Osteoporosis Mother    • Cancer Mother         breast and lung   • Cancer Father         small cell cancer, kidney   • Heart disease Father    • Diabetes Father    • No Known Problems Sister    • Aneurysm Brother      Social History     Socioeconomic History   • Marital status: Single     Spouse name: Not on file   • Number of children: Not on file   • Years of education: Not on file   • Highest education level: Not on file   Tobacco Use   • Smoking status: Current Every Day Smoker     Packs/day: 1.00     Years: 45.00     Pack years: 45.00     Types: Cigarettes   • Smokeless tobacco: Never Used   Substance and Sexual Activity   • Alcohol use: No   • Drug use: No   • Sexual activity: Defer     Birth control/protection: None     ---------------------------------------------------------------------------------------------------------------------   Allergies:  Patient has no known allergies.  ---------------------------------------------------------------------------------------------------------------------   Medications below are reported home medications pulling from within the system; at this time, these medications have not been reconciled unless otherwise specified and are in the verification process for further verifcation as current home medications.    Prior to Admission Medications     Prescriptions Last Dose Informant Patient Reported? Taking?    Alcohol Swabs (B-D SINGLE USE SWABS  REGULAR) pads   Yes Yes    aspirin  MG tablet   Yes Yes    Take 325 mg by mouth Every 6 (Six) Hours As Needed.    Blood Glucose Monitoring Suppl (ACCU-CHEK JOSE G) device   No Yes    Use as directed to test blood sugar three times daily and and bedtime.    diclofenac (VOLTAREN) 75 MG EC tablet   No Yes    Take 1 tablet by mouth 2 (Two) Times a Day.    doxazosin (CARDURA) 8 MG tablet  Self Yes Yes    Take 8 mg by mouth Every Morning.    finasteride (PROSCAR) 5 MG tablet   No Yes    Take 1 tablet by mouth Daily.    gabapentin (NEURONTIN) 600 MG tablet  Self Yes Yes    600 mg 3 (Three) Times a Day.    glimepiride (AMARYL) 4 MG tablet  Self Yes Yes    Take 4 mg by mouth Every Morning Before Breakfast.    glucose blood test strip   No Yes    Use as directed to test blood sugar three times daily and and bedtime.    losartan (COZAAR) 50 MG tablet   No Yes    Take 1 tablet by mouth Daily.    meloxicam (MOBIC) 7.5 MG tablet   Yes Yes    2 (two) times a day.    metFORMIN (GLUCOPHAGE) 500 MG tablet  Self Yes Yes    Take 500 mg by mouth 2 (Two) Times a Day With Meals.    metoprolol succinate XL (TOPROL-XL) 25 MG 24 hr tablet   No Yes    Take 0.5 tablets by mouth Daily.    nitrofurantoin, macrocrystal-monohydrate, (Macrobid) 100 MG capsule   No Yes    Take 1 capsule by mouth Daily.    nitroglycerin (NITROSTAT) 0.4 MG SL tablet   No Yes    1 under the tongue as needed for angina, may repeat q5mins for up three doses    pantoprazole (PROTONIX) 40 MG EC tablet  Self Yes Yes    Take 40 mg by mouth daily.    rosuvastatin (CRESTOR) 5 MG tablet   No Yes    Take 1 tablet by mouth Daily.    tamsulosin (FLOMAX) 0.4 MG capsule 24 hr capsule   No Yes    Take 1 capsule by mouth Every Night.    traMADol (ULTRAM) 50 MG tablet   Yes Yes    TRUEPLUS LANCETS 33G misc   No Yes    Use as directed to test blood sugar three times daily and and bedtime.    zolpidem (AMBIEN) 10 MG tablet  Self Yes Yes    Take 10 mg by mouth at night as needed.     cyclobenzaprine (FLEXERIL) 10 MG tablet   Yes No    2 (two) times a day.    Mirabegron ER (Myrbetriq) 50 MG tablet sustained-release 24 hour 24 hr tablet   No No    Take 50 mg by mouth Daily.        ---------------------------------------------------------------------------------------------------------------------    Objective     Hospital Scheduled Meds:          Current listed hospital scheduled medications may not yet reflect those currently placed in orders that are signed and held, awaiting patient's arrival to floor/unit.    ---------------------------------------------------------------------------------------------------------------------   Vital Signs:  Temp:  [97.2 °F (36.2 °C)] 97.2 °F (36.2 °C)  Heart Rate:  [67-80] 80  Resp:  [18] 18  BP: (159-187)/() 169/83  No data found.  SpO2 Percentage    04/13/21 1430 04/13/21 1526 04/13/21 1630   SpO2: 97% 97% 97%     SpO2:  [94 %-98 %] 97 %  on   ;   Device (Oxygen Therapy): room air    Body mass index is 35.44 kg/m².  Wt Readings from Last 3 Encounters:   04/13/21 112 kg (247 lb)   03/11/21 110 kg (242 lb)   02/09/21 111 kg (245 lb)       ---------------------------------------------------------------------------------------------------------------------   Physical Exam:  Physical Exam  Nursing note reviewed.   Constitutional:       General: He is awake. He is not in acute distress.     Appearance: He is well-developed. He is obese. He is ill-appearing (Chronically). He is not toxic-appearing.      Comments: Sitting up in ED stretcher, significant other at bedside seen in room 402.  No acute distress noted.On room air.   HENT:      Head: Normocephalic and atraumatic.      Right Ear: External ear normal.      Left Ear: External ear normal.      Nose: Nose normal.      Mouth/Throat:      Mouth: Mucous membranes are moist.      Pharynx: Oropharynx is clear.   Eyes:      Extraocular Movements: Extraocular movements intact.      Conjunctiva/sclera:  Conjunctivae normal.      Pupils: Pupils are equal, round, and reactive to light.   Cardiovascular:      Rate and Rhythm: Normal rate and regular rhythm.      Pulses:           Dorsalis pedis pulses are 2+ on the right side and 2+ on the left side.        Posterior tibial pulses are 2+ on the right side and 2+ on the left side.      Heart sounds: Normal heart sounds. No murmur heard.   No friction rub. No gallop.    Pulmonary:      Effort: Pulmonary effort is normal. No tachypnea, accessory muscle usage or respiratory distress.      Breath sounds: Normal air entry. Decreased breath sounds present. No wheezing, rhonchi or rales.      Comments: Speaks in full sentences without dyspnea, on room air.   Chest:      Chest wall: No tenderness.      Comments: No reproducible chest pain  Abdominal:      General: Abdomen is protuberant. Bowel sounds are normal. There is no distension.      Palpations: Abdomen is soft.      Tenderness: There is no abdominal tenderness. There is no guarding or rebound.      Comments: Difficult to assess organomegaly secondary to body habitus   Genitourinary:     Comments: No shea catheter in place.  Musculoskeletal:      Cervical back: Normal range of motion and neck supple.      Right lower leg: Pitting Edema present.      Left lower leg: Pitting Edema present.      Comments: Negative Colleen's bilateral lower extremities    Skin:     General: Skin is warm and dry.      Capillary Refill: Capillary refill takes less than 2 seconds.      Findings: No abscess, erythema, lesion or wound.   Neurological:      General: No focal deficit present.      Mental Status: He is alert and oriented to person, place, and time.      GCS: GCS eye subscore is 4. GCS verbal subscore is 5. GCS motor subscore is 6.      Cranial Nerves: Cranial nerves are intact.      Sensory: Sensation is intact.      Motor: No weakness (Chronic, age related, no lateralizing weakness noted ).      Comments: Awake and alert. Follows  commands. Answers questions appropriately. Moves all extremities equally. Strength and sensation intact. No focal neuro deficit on exam. No lateralizing weakness noted.   Psychiatric:         Attention and Perception: Attention normal.         Mood and Affect: Mood and affect normal.         Speech: Speech normal.         Behavior: Behavior normal. Behavior is cooperative.         Thought Content: Thought content normal.         Cognition and Memory: Cognition and memory normal.         Judgment: Judgment normal.       ---------------------------------------------------------------------------------------------------------------------  EKG:      Telemetry:    Patient is not currently on telemetry, review once available    I have personally reviewed the EKG/Telemetry strip  ---------------------------------------------------------------------------------------------------------------------   Results from last 7 days   Lab Units 04/13/21  1507 04/13/21  1303   TROPONIN T ng/mL 0.021 <0.010           Results from last 7 days   Lab Units 04/13/21  1303   WBC 10*3/mm3 8.21   HEMOGLOBIN g/dL 9.1*   HEMATOCRIT % 29.3*   MCV fL 76.5*   MCHC g/dL 31.1*   PLATELETS 10*3/mm3 217     Results from last 7 days   Lab Units 04/13/21  1303   SODIUM mmol/L 129*   POTASSIUM mmol/L 4.7   CHLORIDE mmol/L 99   CO2 mmol/L 20.1*   BUN mg/dL 8   CREATININE mg/dL 1.31*   EGFR IF NONAFRICN AM mL/min/1.73 55*   CALCIUM mg/dL 8.9   GLUCOSE mg/dL 146*   ALBUMIN g/dL 4.05   BILIRUBIN mg/dL 0.2   ALK PHOS U/L 113   AST (SGOT) U/L 12   ALT (SGPT) U/L 6   Estimated Creatinine Clearance: 70.5 mL/min (A) (by C-G formula based on SCr of 1.31 mg/dL (H)).  No results found for: HGBA1C, POCGLU  Lab Results   Component Value Date    HGBA1C 9.20 (H) 11/14/2017     Lab Results   Component Value Date    TSH 2.850 09/24/2020    FREET4 1.16 09/24/2020     Microbiology Results (last 10 days)     ** No results found for the last 240 hours. **         Pain  Management Panel     Pain Management Panel Latest Ref Rng & Units 11/14/2017    AMPHETAMINES SCREEN, URINE Negative Negative    BARBITURATES SCREEN Negative Negative    BENZODIAZEPINE SCREEN, URINE Negative Negative    BUPRENORPHINEUR Negative Negative    COCAINE SCREEN, URINE Negative Negative    METHADONE SCREEN, URINE Negative Negative        I have personally reviewed the above laboratory results.   ---------------------------------------------------------------------------------------------------------------------  Imaging Results (Last 7 Days)     Procedure Component Value Units Date/Time    CT Chest Pulmonary Embolism [[id number]] Collected: 04/13/21 1452     Updated: 04/13/21 1515    Narrative:      FINDINGS: Today's study demonstrates opacification of the central  pulmonary vessels.   There are no filling defects.   There is no truncation.  No evidence of a pulmonary embolus.  Otherwise there are no parenchymal soft tissue nodules or masses.  There is no mediastinal lymph node enlargement  No pericardial or pleural effusion.    Impression:      1. No evidence of a pulmonary embolus  2. COPD.     This report was finalized on 4/13/2021 2:52 PM by Dr. Chris Wilson MD.    CT Head Without Contrast [[id number]] Collected: 04/13/21 1421     Updated: 04/13/21 1512    Narrative:      FINDINGS:   Today's study shows no mass, hemorrhage, or midline shift.   The ventricles, cisterns, and sulci are unremarkable. There is no  hydrocephalus.   Artifact from what appears to be clips adjacent to the right clinoid.  I do not see epidural or subdural hematoma.  The gray-white differentiation is appropriate.   The bone window setting images show no destructive calvarial lesion or  acute calvarial fracture.   The posterior fossa is unremarkable.    Impression:      1. No acute parenchymal mass, hemorrhage, or midline shift  2. Cerebral atrophy     This report was finalized on 4/13/2021 2:21 PM by Dr. Chris Wilson MD.    XR  Chest 1 View [[id number]] Collected: 04/13/21 1415     Updated: 04/13/21 1418    Narrative:      FINDINGS:  There is no focal alveolar infiltrate or effusion.  The cardiac silhouette is normal. The pulmonary vasculature is  unremarkable.  There is no evidence of an acute osseous abnormality.   There are no suspicious-appearing parenchymal soft tissue nodules.    Impression:      No evidence of active or acute cardiopulmonary disease on today's chest  radiograph.     This report was finalized on 4/13/2021 2:16 PM by Dr. Chris Wilson MD       I have personally reviewed the above radiology results.     Last Echocardiogram:  Results for orders placed during the hospital encounter of 08/30/16    Adult transthoracic echo complete    Interpretation Summary  · Left ventricular wall thickness is consistent with mild concentric hypertrophy.  · All left ventricular wall segments contract normally.  · Left ventricular mass index is borderline increased.  · Estimated EF = 60%.  · No significant valvular heart disease.  · There is no evidence of pericardial effusion    ---------------------------------------------------------------------------------------------------------------------    Assessment & Plan      -Chest pain, mixed features  -History of coronary artery disease s/p BMS to RCA 2013, EDELMIRA x 2 LAD, and balloon angioplasty to medial trunk of LAD diagonal 2014  -Essential hypertension with mild hypertensive urgency present on admission   -Hyperlipidemia   • Patient with mixed features  • Initial troponin negative, repeat 0.021  • EKG poor sample, non specific. Will obtain stat repeat upon arrival to floor.   • Continue aspirin and statin  • Obtain lipid panel, A1C, TSH for further risk stratification   • Encourage tobacco cessation  • Close telemetry monitoring   • BP significantly elevated in 180 on arrival.   • Plan to resume home antihypertensive regimen once reconciled per pharmacy with appropriate holding parameters  to prevent hypotension and/or bradycardia   • Closely monitor BP per hospital protocol, titrate medications as necessary  • Will make PRN IV hydralazine available  • Obtain TTE, previous TTE on file reviewed with preserved EF and no wall motion abnormalities.  • Cardiology consult, input/assistance is much appreciated.   • NPO after midnight for possible stress test/ischemic evaluation     -Dizziness/lightheadedness  · Head CT with no evidence of acute intracranial abnormality  · D dimer positive, CT PE protocol negative for PE  · Obtain bilateral carotid Doppler ultrasound  · Obtain repeat TTE  · Obtain orthostatic vitals  · Fall precautions  · Neuro checks    -D Dimer elevated   · CT PE protocol negative   · Obtain bilateral lower extremity venous doppler to rule out DVT.    -Acute kidney injury   · Baseline creatinine 0.9-1.0 with creatinine on admission of 1.31  · 1 liter NS bolus administered in ED  · Hold home Losartan  · Obtain urine electrolytes   · Monitor urine output and renal function closely  · Avoid nephrotoxins as much as possible   · Repeat chemistry panel in AM    -Hyponatremia   · Patient appears to have chronic hyponatremia   · Upon questioning patient, patient was significant oral intake of fluids per day.  Patient did receive 1 L bolus normal saline in the ED.  We will hold off on any further fluids.  We will repeat chemistry panel in the a.m. and if further decrease, will consider fluid restriction.  · Hold home losartan    -Type II diabetes mellitus, non-insulin dependent  • Obtain HgbA1C  • Review home medication regimen once reconciled per pharmacy   • Hold home oral DM medications for now.  • Start low dose SSI, titrate dosage as necessary   • Closely monitor blood glucose levels with accuchecks QAC and QHS  • Hypoglycemia protocol in place should it be necessary    -Non oxygen dependent COPD without acute exacerbation   · PRN nebs   · Continue supplemental oxygen as necessary to titrate  SpO2 > 90%. Continuous pulse oximetry monitoring.    -History of cerebral aneurysm s/p repair 1987  -History of TIA   · Head CT with no evidence of acute intracranial abnormalities   · No focal neuro deficits on exam   · Aspirin/statin   · Monitor closely     -Chronic microcytic anemia   · Hemoglobin appears stable compared to previous labs on file   · No active bleeding noted or reported  · Obtain AM iron profile   · Closely monitor H&H, transfuse if necessary   · Repeat CBC in AM    -GERD  • PPI    -BPH  -History of frequent UTI/overactive bladder  · Supportive care, monitor output   · Patient is on chronic suppressive therapy  · Plan to resume home medication regimen once reconciled per pharmacy     -Ongoing tobacco abuse   · Encourage cessation   · NRT ordered     -Obesity, BMI 35.44  · Affecting all aspects of care    -F/E/N  • No IV fluids. Replace electrolytes per protocol. Cardiac/consistent carbohydrate diet. NPO after midnight.     ---------------------------------------------------  DVT Prophylaxis: Lovenox  GI Prophylaxis: PPI  Activity: Up with assistance as tolerated, fall precautions    The patient is considered to be a high risk patient due to: Chest pain, CAD, HTN, HLD, elevated D dimer, hyponatremia, TERESA, ongoing tobacco abuse, obesity, DM, COPD    OBSERVATION status, however if further evaluation or treatment plans warrant, status may change.  Based upon current information, I predict patient's care encounter to be less than or equal to 2 midnights.    Code Status: FULL CODE     Disposition/Discharge planning: Plans for home at discharge. Admitted to observation status today for chest pain rule out.    I have discussed the patient's assessment and plan with the patient, nursing staff, significant other at bedside, and attending physician Matthew Melara, DO Claudia Antony PA-C  Hospitalist Service -- [facility]   Pager: [phone]    04/13/21  17:20 EDT    Attending  Physician: Dr. Zayas DO       ---------------------------------------------------------------------------------------------------------------------

## 2021-04-13 NOTE — ED PROVIDER NOTES
Subjective   65-year-old male who presents the emergency department chief complaint nausea, vomiting, chest pain that started approximately 1 to 2 hours before arrival.  Patient does have significant history of coronary artery disease with previous stents placed, COPD, diabetes, hyperlipidemia, hypertension, previous brain aneurysm.  Patient states he started having lightheadedness and dizziness earlier this a.m.  Shortly after nausea and vomiting followed and now has had substernal chest pressure.  Patient does state this feels like previous episode when he had a heart attack.      History provided by:  Patient   used: No    Chest Pain  Pain location:  L chest and substernal area  Pain quality: aching, throbbing and tightness    Pain radiates to:  Does not radiate  Pain severity:  Moderate  Onset quality:  Gradual  Duration:  1 day  Timing:  Intermittent  Progression:  Worsening  Chronicity:  New  Context: not breathing, not drug use, not eating, not intercourse, not lifting, not movement, not raising an arm and not at rest    Relieved by:  Nothing  Ineffective treatments:  None tried  Associated symptoms: AICD problem and shortness of breath    Associated symptoms: no altered mental status, no anorexia, no anxiety, no back pain, no cough, no fatigue, no fever, no headache, no numbness, no orthopnea and no palpitations    Risk factors: high cholesterol and hypertension    Risk factors: no coronary artery disease, no Rachelle-Danlos syndrome, no immobilization, not male, no prior DVT/PE, no smoking and no surgery        Review of Systems   Constitutional: Negative.  Negative for chills, fatigue and fever.   Eyes: Negative.  Negative for itching.   Respiratory: Positive for shortness of breath. Negative for cough.    Cardiovascular: Positive for chest pain. Negative for palpitations and orthopnea.   Gastrointestinal: Negative for anorexia.   Genitourinary: Negative.  Negative for discharge,  enuresis, flank pain, frequency and penile pain.   Musculoskeletal: Negative.  Negative for back pain, myalgias and neck pain.   Skin: Negative.  Negative for pallor and rash.   Neurological: Negative.  Negative for seizures, speech difficulty, light-headedness, numbness and headaches.   Hematological: Negative.  Negative for adenopathy. Does not bruise/bleed easily.   Psychiatric/Behavioral: Negative.  Negative for behavioral problems, confusion, decreased concentration and hallucinations. The patient is not hyperactive.    All other systems reviewed and are negative.      Past Medical History:   Diagnosis Date   • Arthritis    • Bladder stones    • BPH (benign prostatic hyperplasia)    • Brain aneurysm     1987   • Brain aneurysm 1987   • Cancer (CMS/[facility])     skin ca- felix   • Claustrophobia    • COPD (chronic obstructive pulmonary disease) (CMS/[facility])    • Coronary artery disease    • Diabetes (CMS/[facility])    • Difficulty urinating    • Fracture, finger    • GERD (gastroesophageal reflux disease)    • Hematuria    • High cholesterol    • History of sepsis    • Hypertension    • MI (myocardial infarction) (CMS/[facility])     2013   • Neuropathy     FEET AND LEGS   • Prostate disease    • Stroke (CMS/[facility])     X3 last one 9/2016   TIA   • TIA (transient ischemic attack)    • Unsteady gait    • UTI (urinary tract infection)        No Known Allergies    Past Surgical History:   Procedure Laterality Date   • CARDIAC CATHETERIZATION  03/03/2014   • CARDIAC SURGERY     • CARDIOVASCULAR STRESS TEST  04/04/2013   • CATARACT EXTRACTION     • CEREBRAL ANEURYSM REPAIR     • COLONOSCOPY     • CORONARY ANGIOPLASTY WITH STENT PLACEMENT     • CYSTOSCOPY LITHOLAPAXY BLADDER STONE EXTRACTION N/A 11/29/2017    Procedure: CYSTOSCOPY LITHOLAPAXY BLADDER STONE EXTRACTION;  Surgeon: Flavio Barrios MD;  Location: [facility];  Service:    • ECHO - CONVERTED  02/02/2014   • EYE SURGERY     • INNER EAR SURGERY     • SKIN BIOPSY     •  TONSILLECTOMY     • URETHRAL DILATATION N/A 6/1/2020    Procedure: CYSTOSCOPY URETHRAL DILATATION;  Surgeon: Flavio Barrios MD;  Location: [facility];  Service: Urology;  Laterality: N/A;       Family History   Problem Relation Age of Onset   • Osteoporosis Mother    • Cancer Mother         breast and lung   • Cancer Father         small cell cancer, kidney   • Heart disease Father    • Diabetes Father    • No Known Problems Sister    • Aneurysm Brother        Social History     Socioeconomic History   • Marital status: Single     Spouse name: Not on file   • Number of children: Not on file   • Years of education: Not on file   • Highest education level: Not on file   Tobacco Use   • Smoking status: Current Every Day Smoker     Packs/day: 1.00     Years: 45.00     Pack years: 45.00     Types: Cigarettes   • Smokeless tobacco: Never Used   Substance and Sexual Activity   • Alcohol use: No   • Drug use: No   • Sexual activity: Defer     Birth control/protection: None           Objective   Physical Exam  Vitals and nursing note reviewed.   Constitutional:       General: He is not in acute distress.     Appearance: He is well-developed and normal weight. He is not ill-appearing, toxic-appearing or diaphoretic.   HENT:      Head: Normocephalic and atraumatic.   Eyes:      Extraocular Movements: Extraocular movements intact.      Pupils: Pupils are equal, round, and reactive to light.   Neck:      Thyroid: No thyromegaly.      Vascular: No hepatojugular reflux or JVD.      Trachea: No tracheal deviation.   Cardiovascular:      Rate and Rhythm: Normal rate and regular rhythm.      Pulses:           Carotid pulses are 2+ on the right side and 2+ on the left side.       Radial pulses are 2+ on the right side and 2+ on the left side.        Dorsalis pedis pulses are 2+ on the right side and 2+ on the left side.        Posterior tibial pulses are 2+ on the right side and 2+ on the left side.      Heart sounds: Normal  heart sounds. Heart sounds not distant. No murmur heard.   No diastolic murmur is present.     Pulmonary:      Effort: Pulmonary effort is normal. No tachypnea, accessory muscle usage or respiratory distress.      Breath sounds: Normal breath sounds. No decreased breath sounds, wheezing, rhonchi or rales.   Chest:      Chest wall: No mass, deformity, tenderness, crepitus or edema. There is no dullness to percussion.   Abdominal:      General: Bowel sounds are normal. There is no abdominal bruit.      Palpations: Abdomen is soft. There is no fluid wave, hepatomegaly, splenomegaly or mass.      Tenderness: There is no abdominal tenderness. There is no guarding or rebound.   Musculoskeletal:         General: Normal range of motion.      Cervical back: Normal range of motion and neck supple.      Right lower leg: No tenderness. No edema.      Left lower leg: Tenderness present. No edema.   Lymphadenopathy:      Cervical: No cervical adenopathy.   Skin:     General: Skin is warm and dry.      Capillary Refill: Capillary refill takes less than 2 seconds.      Coloration: Skin is not cyanotic or pale.      Findings: No ecchymosis, erythema or rash.      Nails: There is no clubbing.   Neurological:      General: No focal deficit present.      Mental Status: He is alert and oriented to person, place, and time.      Cranial Nerves: No cranial nerve deficit.      Motor: No weakness.   Psychiatric:         Mood and Affect: Mood normal. Mood is not anxious.         Behavior: Behavior normal. Behavior is not agitated.         Procedures           ED Course  ED Course as of Apr 14 0802   Tue Apr 13, 2021   1403 Ventricular rate 73, , QRS 92, QTc 440, normal sinus rhythm however there is some findings consistent with left ventricular hypertrophy.  T wave inversion seen in 2 3 and aVF.  No reciprocal ST elevation noted.  Will repeat EKG now    [JM]   1406 Repeat EKG, ventricular rate 65, , QRS 98, QTc 459, sinus rhythm  with 1 PVC, T wave inversions still present in 3 T waves flattened in aVF however this EKG is improved.    []   1427    IMPRESSION:  No evidence of active or acute cardiopulmonary disease on today's chest  radiograph.       []   1536 Call placed to hospitalist for possible admit.    []   1547 IMPRESSION:  1. No evidence of a pulmonary embolus  2. COPD.    []   1553 Discussed care with Dr. Tapia.  Patient will be admitted to telemetry for further evaluation.  Patient does state his chest pain is improved at this time.    []      ED Course User Index  [] Hayden Gale PA-C  [] Matthew Yee, DO                                           MDM    Final diagnoses:   Chest pain in adult       ED Disposition  ED Disposition     ED Disposition Condition Comment    Decision to Admit  Level of Care: Telemetry [5]   Diagnosis: Chest pain in adult [[id number]]   Admitting Physician: TRACE LEUNG [[id number]]            Nathan Espinal MD  [address] [address]  [phone]               Medication List      ASK your doctor about these medications    diclofenac 75 MG EC tablet  Commonly known as: VOLTAREN  Ask about: Which instructions should I use?     metoprolol succinate XL 25 MG 24 hr tablet  Commonly known as: TOPROL-XL  Ask about: Which instructions should I use?     nitrofurantoin (macrocrystal-monohydrate) 100 MG capsule  Commonly known as: MACROBID  Ask about: Which instructions should I use?             Hayden Gale PA-C  04/14/21 0802

## 2021-04-14 VITALS
BODY MASS INDEX: 34.58 KG/M2 | HEIGHT: 71 IN | HEART RATE: 80 BPM | SYSTOLIC BLOOD PRESSURE: 143 MMHG | DIASTOLIC BLOOD PRESSURE: 87 MMHG | WEIGHT: 247 LBS | TEMPERATURE: 98.2 F | RESPIRATION RATE: 20 BRPM | OXYGEN SATURATION: 99 %

## 2021-04-14 LAB
CREAT UR-MCNC: 63.1 MG/DL
SODIUM UR-SCNC: 75 MMOL/L

## 2021-04-14 PROCEDURE — 84300 ASSAY OF URINE SODIUM: CPT | Performed by: PHYSICIAN ASSISTANT

## 2021-04-14 PROCEDURE — G0378 HOSPITAL OBSERVATION PER HR: HCPCS

## 2021-04-14 PROCEDURE — 82570 ASSAY OF URINE CREATININE: CPT | Performed by: PHYSICIAN ASSISTANT

## 2021-04-14 NOTE — PLAN OF CARE
Goal Outcome Evaluation:  Plan of Care Reviewed With: patient  Pt resting in bed with no distress noted. Denied CP. Pt is to be NPO for cardiology consult. Pt was orthostatic when ortho's obtained. MD was made aware. Will continue to follow plan of care.

## 2021-04-14 NOTE — NURSING NOTE
Madison called back and stated she would be to get him within 10 mins and would call the floor to let us know when she arrived.

## 2021-04-14 NOTE — NURSING NOTE
Pt requesting to leave ama. Multiple attempts made to contact significant other with no answer. Pt refusing to be NPO and requesting coffee. Will keep trying to attempt to reach.

## 2021-04-14 NOTE — DISCHARGE SUMMARY
Date of Admission: 4/13/2021    Date of Discharge:  4/14/2021    PCP: Nathan Espinal MD    Admission Diagnosis:   Please see admission H&P    Discharge Diagnosis:   Chest pain  CAD with hx of stent placement  Essential HTN  Hyperlipidemia  Dizziness  (+) D-Dimer  TERESA  Orthostatic hypotension  Chronic hyponatremia  DM II, non-insulin dependent  COPD, stable without an acute exacerbation   Chronic microcytic anemia    Procedures Performed: None     Consults:   Consults     No orders found for last 30 day(s).            History of Present Illness:  Jovanni Richter is a 65 y.o. male who presented to [facility] ED with CC of chest pain. Please see admission H&P for complete details.     In the ED, his BP was elevated upon arrival. EKG revealed sinus rhythm with PVCs and T wave changes. Troponin was negative. CXR did not reveal any evidence of pneumonia. D-Dimer was positive and a CT chest PE protocol was obtained which was negative for PE. CT head was negative for any acute intracranial abnormalities. He was given full dose ASA, Morphine, nitropaste, Zofran and IV fluids.        Hospital Course  Jovanni Richter was admitted to the telemetry floor for further evaluation and treatment. He was continued on ASA and a statin with plan to resume pt's home medication regimen with exception of his home losartan. PRN IV hydralazine was made available. Sliding scale insulin was ordered. A nicotine patch was ordered. Cardiology was consulted for further input.     A lipid panel, HgbA1c, TSH, echo, carotid US and venous duplex US of the lower extremities were ordered. Orthostatic VS were ordered and he was noted to be orthostatic. Unfortunately, soon after admission pt began stating he wished to sign out AMA. He refused to be NPO and requested coffee. Multiple attempts were made to reach his significant other and once she was contacted she agreed to come pick him up. Once she arrived he then proceeded to leave AMA.     Condition on  Discharge: Guarded as pt left AMA.     Vital Signs  Vitals:    04/14/21 0032   BP: 143/87   Pulse: 80   Resp: 20   Temp:    SpO2: 99%         Discharge Disposition: Pt left AMA.       Discharge Medications:  Not provided as pt left AMA.       Follow-up Appointments:  Follow-up Information     Nathan Espinal MD .    Specialty: Family Medicine  Contact information:  [address]  [facility] [address] [phone]                 Your Scheduled Appointments    May 11, 2021 11:00 AM  Follow Up with Griselda Cheng-Akwa, APRN  [facility] GASTROENTEROLOGY [facility]OLOG[facility] ([facility] [address] [facility] [address] [phone]   Arrive 15 minutes prior to appointment.     Kenrick 10, 2021  1:45 PM  Follow Up with Heber Tejada MD  [facility] CARDIOLOGY (--) [address]  [facility] [address] [phone]   Arrive 15 minutes prior to appointment.                 Flavio Zayas DO  04/14/21  06:59 EDT      Time: Less than 30 minutes spent on this discharge.

## 2021-04-15 LAB
QT INTERVAL: 418 MS
QT INTERVAL: 426 MS
QT INTERVAL: 442 MS
QTC INTERVAL: 451 MS
QTC INTERVAL: 452 MS
QTC INTERVAL: 459 MS

## 2021-04-15 RX ORDER — ROSUVASTATIN CALCIUM 5 MG/1
TABLET, COATED ORAL
Qty: 90 TABLET | Refills: 0 | Status: SHIPPED | OUTPATIENT
Start: 2021-04-15 | End: 2021-08-13 | Stop reason: SDUPTHER

## 2021-04-28 ENCOUNTER — HOSPITAL ENCOUNTER (EMERGENCY)
Facility: HOSPITAL | Age: 66
Discharge: LEFT BEFORE DISCHARGE | End: 2021-04-28

## 2021-04-28 VITALS
DIASTOLIC BLOOD PRESSURE: 82 MMHG | OXYGEN SATURATION: 97 % | RESPIRATION RATE: 14 BRPM | HEART RATE: 78 BPM | SYSTOLIC BLOOD PRESSURE: 185 MMHG | WEIGHT: 250 LBS | BODY MASS INDEX: 35 KG/M2 | HEIGHT: 71 IN | TEMPERATURE: 98.9 F

## 2021-04-28 PROCEDURE — 99211 OFF/OP EST MAY X REQ PHY/QHP: CPT

## 2021-05-17 DIAGNOSIS — R35.0 BENIGN PROSTATIC HYPERPLASIA WITH URINARY FREQUENCY: Primary | ICD-10-CM

## 2021-05-17 DIAGNOSIS — N40.1 BENIGN PROSTATIC HYPERPLASIA WITH URINARY FREQUENCY: Primary | ICD-10-CM

## 2021-05-17 RX ORDER — DOXAZOSIN 8 MG/1
8 TABLET ORAL DAILY
Qty: 30 TABLET | Refills: 3 | Status: SHIPPED | OUTPATIENT
Start: 2021-05-17 | End: 2021-06-01 | Stop reason: SDUPTHER

## 2021-05-17 NOTE — TELEPHONE ENCOUNTER
Patient says we prescribe him doxazosin (CARDURA) 8 MG tablet I do not see where we have but he is insisting we have and is needing refills

## 2021-05-17 NOTE — TELEPHONE ENCOUNTER
Verified which pharmacy the pt is using which is [facility]. Notified the pt that I will routed this to [employer] to send in, pt voiced understanding.

## 2021-06-01 ENCOUNTER — OFFICE VISIT (OUTPATIENT)
Dept: UROLOGY | Facility: CLINIC | Age: 66
End: 2021-06-01

## 2021-06-01 VITALS — HEIGHT: 71 IN | BODY MASS INDEX: 33.68 KG/M2 | WEIGHT: 240.6 LBS

## 2021-06-01 DIAGNOSIS — R35.0 BENIGN PROSTATIC HYPERPLASIA WITH URINARY FREQUENCY: ICD-10-CM

## 2021-06-01 DIAGNOSIS — N40.1 BENIGN PROSTATIC HYPERPLASIA WITH URINARY FREQUENCY: ICD-10-CM

## 2021-06-01 DIAGNOSIS — R35.0 FREQUENCY OF MICTURITION: ICD-10-CM

## 2021-06-01 DIAGNOSIS — N32.81 OVERACTIVE BLADDER: ICD-10-CM

## 2021-06-01 DIAGNOSIS — N39.0 RECURRENT UTI: Primary | ICD-10-CM

## 2021-06-01 LAB
BILIRUB BLD-MCNC: NEGATIVE MG/DL
CLARITY, POC: CLEAR
COLOR UR: YELLOW
GLUCOSE UR STRIP-MCNC: NEGATIVE MG/DL
KETONES UR QL: NEGATIVE
LEUKOCYTE EST, POC: ABNORMAL
NITRITE UR-MCNC: NEGATIVE MG/ML
PH UR: 7 [PH] (ref 5–8)
PROT UR STRIP-MCNC: NEGATIVE MG/DL
RBC # UR STRIP: ABNORMAL /UL
SP GR UR: 1.01 (ref 1–1.03)
UROBILINOGEN UR QL: NORMAL

## 2021-06-01 PROCEDURE — 81003 URINALYSIS AUTO W/O SCOPE: CPT | Performed by: NURSE PRACTITIONER

## 2021-06-01 PROCEDURE — 99214 OFFICE O/P EST MOD 30 MIN: CPT | Performed by: NURSE PRACTITIONER

## 2021-06-01 PROCEDURE — 87086 URINE CULTURE/COLONY COUNT: CPT | Performed by: NURSE PRACTITIONER

## 2021-06-01 RX ORDER — FINASTERIDE 5 MG/1
5 TABLET, FILM COATED ORAL DAILY
Qty: 30 TABLET | Refills: 5 | Status: SHIPPED | OUTPATIENT
Start: 2021-06-01 | End: 2021-12-02

## 2021-06-01 RX ORDER — DOXAZOSIN 8 MG/1
8 TABLET ORAL DAILY
Qty: 30 TABLET | Refills: 3 | Status: SHIPPED | OUTPATIENT
Start: 2021-06-01 | End: 2022-01-01

## 2021-06-01 RX ORDER — NITROFURANTOIN 25; 75 MG/1; MG/1
100 CAPSULE ORAL DAILY
Qty: 30 CAPSULE | Refills: 5 | Status: SHIPPED | OUTPATIENT
Start: 2021-06-01 | End: 2021-12-07 | Stop reason: SDUPTHER

## 2021-06-01 NOTE — PROGRESS NOTES
"Chief Complaint  BPH  WITH UrinE Incontinence WHEN DEEP ASLEEP/REC URRENT UTI (3 MONTH FOLLOW UP)    Subjective          Sarah Rubio presents to [facility]  History of Present Illness  MR. SARAH RUBIO IS  A VERY PLEASANTR 65 y.o. male with a significant history of recurrent utis, Urine incontinence with incomplete bladder emptying, who returns to clinic today for HIS 3 MONTH FOLLOW UP.  He is in no apparent distress and reports doing better with his urinary incontinent episodes since starting myrbetriq 10 mg nightly.  He has extensive polypharmacy.     He also reports currently doing better on antibiotic prophylaxis with Macrobid and would like to continue. He also reports a significant improvement in his urinary symptoms.  He states he still has episodes of frequency, urgency,burning on urination and nocturia,  However, he denies dysuria,  And any episodes of gross hematuria.    His last urine culture was also negative, He has had numerous negative cultures, patient has not had a UTI in the last almost 6 months and he is very appreciative for suppressive therapy. His urine sample today show 3+ leukocyte Esterace, Negative nitrites, TRACE microhematuria, his IPSS score is 8.         He had significant urinary incontinence without sensory awareness most of his days,But reports this too has improved and he now only uses 1-2 depends at nighttime only,  compared to 6-8 prior. He has chronic back pain, but denies abdominal pain, flank pain or any CVA tenderness. Denies pelvic pain and pressure.  He denies fever or chills, he does not have nausea, vomiting, or diarrhea.    Overall he reports doing better, he states he is currently taking his antibiotics and probiotics diligently.  He reports  increasing his p.o. fluid intake to at least 2 L daily, and has significantly reduced his intake of soda drinks, bladder irritants.         Objective   Vital Signs:   Ht 180.3 cm (71\") "   Wt 109 kg (240 lb 9.6 oz)   BMI 33.56 kg/m²     Physical Exam  Constitutional:       General: He is not in acute distress.     Appearance: He is well-developed. He is ill-appearing.   HENT:      Head: Normocephalic and atraumatic.      Right Ear: External ear normal.      Left Ear: External ear normal.   Eyes:      General:         Right eye: No discharge.         Left eye: No discharge.      Conjunctiva/sclera: Conjunctivae normal.      Pupils: Pupils are equal, round, and reactive to light.   Neck:      Thyroid: No thyromegaly.      Trachea: No tracheal deviation.   Cardiovascular:      Rate and Rhythm: Normal rate and regular rhythm.      Heart sounds: No murmur heard.   No friction rub.   Pulmonary:      Effort: Pulmonary effort is normal. No respiratory distress.      Breath sounds: Normal breath sounds. No stridor.   Abdominal:      General: Bowel sounds are normal. There is no distension.      Palpations: Abdomen is soft.      Tenderness: There is no abdominal tenderness. There is no guarding.   Genitourinary:     Penis: Normal and uncircumcised. No tenderness or discharge.       Testes: Normal.      Rectum: Normal. Guaiac result negative.   Musculoskeletal:         General: No tenderness or deformity. Normal range of motion.      Cervical back: Normal range of motion and neck supple.   Skin:     General: Skin is warm and dry.      Capillary Refill: Capillary refill takes less than 2 seconds.      Coloration: Skin is pale.   Neurological:      Mental Status: He is alert and oriented to person, place, and time.      Cranial Nerves: No cranial nerve deficit.      Coordination: Coordination normal.   Psychiatric:         Behavior: Behavior normal.         Thought Content: Thought content normal.         Judgment: Judgment normal.        Result Review :     CMP    CMP 9/24/20 4/13/21   Glucose 156 (A) 146 (A)   BUN 5 (A) 8   Creatinine 0.93 1.31 (A)   eGFR Non African Am 82 55 (A)   Sodium 132 (A) 129 (A)    Potassium 4.2 4.7   Chloride 99 99   Calcium 9.1 8.9   Albumin 3.92 4.05   Total Bilirubin 0.2 0.2   Alkaline Phosphatase 116 113   AST (SGOT) 15 12   ALT (SGPT) 9 6   (A) Abnormal value       Comments are available for some flowsheets but are not being displayed.           CBC w/diff    CBC w/Diff 9/24/20 4/13/21   WBC 8.02 8.21   RBC 4.17 3.83 (A)   Hemoglobin 9.0 (A) 9.1 (A)   Hematocrit 30.1 (A) 29.3 (A)   MCV 72.2 (A) 76.5 (A)   MCH 21.6 (A) 23.8 (A)   MCHC 29.9 (A) 31.1 (A)   RDW 19.0 (A) 18.4 (A)   Platelets 276 217   Neutrophil Rel % 56.4 58.4   Immature Granulocyte Rel % 0.2 0.6 (A)   Lymphocyte Rel % 29.7 29.4   Monocyte Rel % 12.2 (A) 8.9   Eosinophil Rel % 0.9 2.2   Basophil Rel % 0.6 0.5   (A) Abnormal value            UA    Urinalysis 11/4/20 2/9/21 6/1/21   Ketones, UA Negative Negative Negative   Leukocytes, UA Large (3+) (A) Large (3+) (A) Large (3+) (A)   (A) Abnormal value       Comments are available for some flowsheets but are not being displayed.           Urine Culture    Urine Culture 11/4/20 2/9/21 6/1/21   Urine Culture Final report No growth No growth                         Assessment and Plan    Diagnoses and all orders for this visit:    1. Recurrent UTI (Primary)  -     POC Urinalysis Dipstick, Automated  -     Urine Culture - Urine, Urine, Clean Catch; Future  -     Urine Culture - Urine, Urine, Clean Catch  -     Mirabegron ER (Myrbetriq) 50 MG tablet sustained-release 24 hour 24 hr tablet; Take 50 mg by mouth Daily.  Dispense: 30 tablet; Refill: 5  -     nitrofurantoin, macrocrystal-monohydrate, (MACROBID) 100 MG capsule; Take 1 capsule by mouth Daily.  Dispense: 30 capsule; Refill: 5    2. Frequency of micturition  -     Mirabegron ER (Myrbetriq) 50 MG tablet sustained-release 24 hour 24 hr tablet; Take 50 mg by mouth Daily.  Dispense: 30 tablet; Refill: 5  -     nitrofurantoin, macrocrystal-monohydrate, (MACROBID) 100 MG capsule; Take 1 capsule by mouth Daily.  Dispense: 30  capsule; Refill: 5    3. Overactive bladder  -     Mirabegron ER (Myrbetriq) 50 MG tablet sustained-release 24 hour 24 hr tablet; Take 50 mg by mouth Daily.  Dispense: 30 tablet; Refill: 5  -     nitrofurantoin, macrocrystal-monohydrate, (MACROBID) 100 MG capsule; Take 1 capsule by mouth Daily.  Dispense: 30 capsule; Refill: 5    4. Benign prostatic hyperplasia with urinary frequency  -     finasteride (PROSCAR) 5 MG tablet; Take 1 tablet by mouth Daily.  Dispense: 30 tablet; Refill: 5  -     doxazosin (CARDURA) 8 MG tablet; Take 1 tablet by mouth Daily.  Dispense: 30 tablet; Refill: 3                                          ASSESSMENT  Recurrent urinary tract infections/Overactive Bladder/urine Incontinence: Very pleasant, ill-looking 65-year-old male returns to clinic today for follow-up on recurrent UTI's, and Urinary incontinence episodes that have remained very bothersome to him.  He however reports doing relatively better, with decreased urinary incontinent episodes, and decreased bladder symptoms consistent with UTIs.  He is urine dipstick today is completely negative for any infection, he has 3+ microscopic hematuria.  His IPSS score is 8, his PVR is 23 cc.. Patient has had negative urine cultures for almost 6 months. The patient reports doing relatively better on his antibiotic prophylaxis, has resumed his Myrbetriq daily, and would like to continue.     Again, we discussed the types of organisms that are found in the urinary tract indicating that the vast majority are results of the patient's own gastrointestinal jerrica.     We discussed the risk factors for recurrent infections being intercourse in younger patients and atrophic changes in older patients.  We discussed the symptoms that are found including pain, pressure, burning, frequency, urgency suprapubic pain and painful intercourse. I discussed upper tract symptoms including fevers, chills, and indicated the workup would be much more aggressive if  the patient were to present with recurrent infections in the face of upper tract symptomatology such as fever.     Urinary Incontinence: We discussed overactive bladder.  Although significantly improved on Myrbetriq 50 mg daily, the patient remains very bothered by his symptoms. We discussed treatable and non-treatable causes of both stress and urge urinary incontinence.  With regards to stress urinary incontinence we discussed its relationship to childbirth in females and pelvic health in both males and females.  We discussed the grading of stress incontinence with trying to quantitate the number of pull ups used daily(he is down to 2-3 daily compared to 6-8 prior).  We talked about leaking urine with laughing, lifting, coughing, and sexual intercourse.  Next we discussed the various therapeutic options including anticholinergics, beta 3 agonists, and alpha blockade if there is a component of obstruction.  I discussed the side effects of anti-cholinergic including dry mouth, double vision.                                      PLAN  We resent his urine for culture, I will will call him with results if any bacterial growth resistant to current therapy.     We discussed he continue his antibiotic prophylaxis with Macrobid daily as directed.  He should increase his p.o. fluid intake to at least 1 to 2 L daily and avoid bladder irritants such as caffeine products, spicy foods, and citrusy foods.      I recommend concomitant probiotics with treatment with antibiotics to protect the rectal reservoir including over-the-counter yogurt preparations to marcy oral pills containing the appropriate probiotics. Patient reports the diligent use of Probiotics.     He should also continue his Myrbetriq 50 mg daily as prescribed AT BEDTIME     Will see him back in 6 months for Follow up in clinic and recheck his urinalysis at that time.    We discussed Botox injections if his symptoms would resolve at this time.    Patient is  agreeable to plan of care.     Patient reports that he is not currently experiencing any symptoms of urinary incontinence.     Patient's Body mass index is 33.23 kg/m². BMI is above normal parameters. Recommendations include: educational material, exercise counseling and nutrition counseling.     Smoking Cessation Counseling:  Current every day smoker. less than 3 minutes spent counseling. Has reduced tobacco use.  I advised patient to quit tobacco use and offered support.  I provided patient with tobacco cessation educational material printed in the patient's After Visit Summary.          Follow Up   Return in about 6 months (around 12/1/2021) for Next scheduled follow up-DISCUSS BOTOX IF SYMPTOMS.  Patient was given instructions and counseling regarding his condition or for health maintenance advice. Please see specific information pulled into the AVS if appropriate.

## 2021-06-01 NOTE — PATIENT INSTRUCTIONS
Urinary Incontinence    Urinary incontinence refers to a condition in which a person is unable to control where and when to pass urine. A person with this condition will urinate when he or she does not mean to (involuntarily).  What are the causes?  This condition may be caused by:  · Medicines.  · Infections.  · Constipation.  · Overactive bladder muscles.  · Weak bladder muscles.  · Weak pelvic floor muscles. These muscles provide support for the bladder, intestine, and, in women, the uterus.  · Enlarged prostate in men. The prostate is a gland near the bladder. When it gets too big, it can pinch the urethra. With the urethra blocked, the bladder can weaken and lose the ability to empty properly.  · Surgery.  · Emotional factors, such as anxiety, stress, or post-traumatic stress disorder (PTSD).  · Pelvic organ prolapse. This happens in women when organs shift out of place and into the vagina. This shift can prevent the bladder and urethra from working properly.  What increases the risk?  The following factors may make you more likely to develop this condition:  · Older age.  · Obesity and physical inactivity.  · Pregnancy and childbirth.  · Menopause.  · Diseases that affect the nerves or spinal cord (neurological diseases).  · Long-term (chronic) coughing. This can increase pressure on the bladder and pelvic floor muscles.  What are the signs or symptoms?  Symptoms may vary depending on the type of urinary incontinence you have. They include:  · A sudden urge to urinate, but passing urine involuntarily before you can get to a bathroom (urge incontinence).  · Suddenly passing urine with any activity that forces urine to pass, such as coughing, laughing, exercise, or sneezing (stress incontinence).  · Needing to urinate often, but urinating only a small amount, or constantly dribbling urine (overflow incontinence).  · Urinating because you cannot get to the bathroom in time due to a physical disability, such as  arthritis or injury, or communication and thinking problems, such as Alzheimer disease (functional incontinence).  How is this diagnosed?  This condition may be diagnosed based on:  · Your medical history.  · A physical exam.  · Tests, such as:  ? Urine tests.  ? X-rays of your kidney and bladder.  ? Ultrasound.  ? CT scan.  ? Cystoscopy. In this procedure, a health care provider inserts a tube with a light and camera (cystoscope) through the urethra and into the bladder in order to check for problems.  ? Urodynamic testing. These tests assess how well the bladder, urethra, and sphincter can store and release urine. There are different types of urodynamic tests, and they vary depending on what the test is measuring.  To help diagnose your condition, your health care provider may recommend that you keep a log of when you urinate and how much you urinate.  How is this treated?  Treatment for this condition depends on the type of incontinence that you have and its cause. Treatment may include:  · Lifestyle changes, such as:  ? Quitting smoking.  ? Maintaining a healthy weight.  ? Staying active. Try to get 150 minutes of moderate-intensity exercise every week. Ask your health care provider which activities are safe for you.  ? Eating a healthy diet.  § Avoid high-fat foods, like fried foods.  § Avoid refined carbohydrates like white bread and white rice.  § Limit how much alcohol and caffeine you drink.  § Increase your fiber intake. Foods such as fresh fruits, vegetables, beans, and whole grains are healthy sources of fiber.  · Pelvic floor muscle exercises.  · Bladder training, such as lengthening the amount of time between bathroom breaks, or using the bathroom at regular intervals.  · Using techniques to suppress bladder urges. This can include distraction techniques or controlled breathing exercises.  · Medicines to relax the bladder muscles and prevent bladder spasms.  · Medicines to help slow or prevent the  growth of a man's prostate.  · Botox injections. These can help relax the bladder muscles.  · Using pulses of electricity to help change bladder reflexes (electrical nerve stimulation).  · For women, using a medical device to prevent urine leaks. This is a small, tampon-like, disposable device that is inserted into the urethra.  · Injecting collagen or carbon beads (bulking agents) into the urinary sphincter. These can help thicken tissue and close the bladder opening.  · Surgery.  Follow these instructions at home:  Lifestyle  · Limit alcohol and caffeine. These can fill your bladder quickly and irritate it.  · Keep yourself clean to help prevent odors and skin damage. Ask your doctor about special skin creams and cleansers that can protect the skin from urine.  · Consider wearing pads or adult diapers. Make sure to change them regularly, and always change them right after experiencing incontinence.  General instructions  · Take over-the-counter and prescription medicines only as told by your health care provider.  · Use the bathroom about every 3-4 hours, even if you do not feel the need to urinate. Try to empty your bladder completely every time. After urinating, wait a minute. Then try to urinate again.  · Make sure you are in a relaxed position while urinating.  · If your incontinence is caused by nerve problems, keep a log of the medicines you take and the times you go to the bathroom.  · Keep all follow-up visits as told by your health care provider. This is important.  Contact a health care provider if:  · You have pain that gets worse.  · Your incontinence gets worse.  Get help right away if:  · You have a fever or chills.  · You are unable to urinate.  · You have redness in your groin area or down your legs.  Summary  · Urinary incontinence refers to a condition in which a person is unable to control where and when to pass urine.  · This condition may be caused by medicines, infection, weak bladder  muscles, weak pelvic floor muscles, enlargement of the prostate (in men), or surgery.  · The following factors increase your risk for developing this condition: older age, obesity, pregnancy and childbirth, menopause, neurological diseases, and chronic coughing.  · There are several types of urinary incontinence. They include urge incontinence, stress incontinence, overflow incontinence, and functional incontinence.  · This condition is usually treated first with lifestyle and behavioral changes, such as quitting smoking, eating a healthier diet, and doing regular pelvic floor exercises. Other treatment options include medicines, bulking agents, medical devices, electrical nerve stimulation, or surgery.  This information is not intended to replace advice given to you by your health care provider. Make sure you discuss any questions you have with your health care provider.  Document Revised: 12/28/2018 Document Reviewed: 03/29/2018  Elsevier Patient Education © 2021 [employer]

## 2021-06-02 LAB — BACTERIA SPEC AEROBE CULT: NO GROWTH

## 2021-06-03 ENCOUNTER — TELEPHONE (OUTPATIENT)
Dept: UROLOGY | Facility: CLINIC | Age: 66
End: 2021-06-03

## 2021-06-03 DIAGNOSIS — N32.81 OVERACTIVE BLADDER: ICD-10-CM

## 2021-06-03 DIAGNOSIS — N39.0 RECURRENT UTI: Primary | ICD-10-CM

## 2021-06-03 NOTE — TELEPHONE ENCOUNTER
We will have to see about doing a PA seeing as the pt has tried oxybutynin 5 mg in order to see what med his insurance may cover.     PA sent to insurance. Awaiting response.    Available without authorization

## 2021-06-03 NOTE — TELEPHONE ENCOUNTER
PT CALLED HIS INSURANCE DOES NOT COVER THE MYRBETRIQ AND PT CAN NOT AFFORD CAN YOU CALL IN SOMETHING ELSE

## 2021-06-03 NOTE — TELEPHONE ENCOUNTER
Called pts pharmacy who stated that #90 went thru for $200, #30  Went thru for $95. Seeing as the medication went thru his insurance is no showing anything that would be covered for less.     Pt has been taking his oxybutynin 5 mg BID which he stated he was unsure of how much that rx was. I explained to the pt that I will talk with Hakan and see what she recommends.       Per Hakan she would like to change the oxybutynin to Vesicare to see if that will help. Sent medication to her to send in and called to notify the pt - LM

## 2021-06-03 NOTE — TELEPHONE ENCOUNTER
----- Message from Griselda Cheng-Akwa, APRN sent at 6/2/2021  4:55 PM EDT -----  Please call patient with negative urine culture results, continue antibiotic therapy and follow-up as discussed.  ----- Message -----  From: Rachele Giang MA  Sent: 6/1/2021  10:53 AM EDT  To: Griselda Cheng-Akwa, APRN

## 2021-06-07 RX ORDER — SOLIFENACIN SUCCINATE 10 MG/1
10 TABLET, FILM COATED ORAL DAILY
Qty: 30 TABLET | Refills: 2 | Status: SHIPPED | OUTPATIENT
Start: 2021-06-07 | End: 2021-09-09

## 2021-07-12 RX ORDER — ROSUVASTATIN CALCIUM 5 MG/1
TABLET, COATED ORAL
Qty: 90 TABLET | Refills: 0 | OUTPATIENT
Start: 2021-07-12

## 2021-07-19 DIAGNOSIS — N32.81 OVERACTIVE BLADDER: Primary | ICD-10-CM

## 2021-07-19 RX ORDER — OXYBUTYNIN CHLORIDE 5 MG/1
5 TABLET ORAL 2 TIMES DAILY
Qty: 60 TABLET | Refills: 4 | Status: SHIPPED | OUTPATIENT
Start: 2021-07-19 | End: 2021-12-07 | Stop reason: SDUPTHER

## 2021-07-19 NOTE — TELEPHONE ENCOUNTER
Patient is saying he needs refill on oxybutin sent into [facility] on [address] I do not see it on med list but he says he has been taking it for awhile now sorry

## 2021-07-19 NOTE — TELEPHONE ENCOUNTER
Pt would like to continue on oxybutynin 5 mg BID, routed to [facility] to send into [facility]

## 2021-08-10 ENCOUNTER — LAB (OUTPATIENT)
Dept: LAB | Facility: HOSPITAL | Age: 66
End: 2021-08-10

## 2021-08-10 DIAGNOSIS — E78.2 MIXED HYPERLIPIDEMIA: ICD-10-CM

## 2021-08-10 LAB
ALBUMIN SERPL-MCNC: 3.9 G/DL (ref 3.5–5.2)
ALBUMIN/GLOB SERPL: 1 G/DL
ALP SERPL-CCNC: 102 U/L (ref 39–117)
ALT SERPL W P-5'-P-CCNC: 8 U/L (ref 1–41)
ANION GAP SERPL CALCULATED.3IONS-SCNC: 8.8 MMOL/L (ref 5–15)
ANISOCYTOSIS BLD QL: ABNORMAL
AST SERPL-CCNC: 11 U/L (ref 1–40)
BILIRUB SERPL-MCNC: 0.3 MG/DL (ref 0–1.2)
BUN SERPL-MCNC: 8 MG/DL (ref 8–23)
BUN/CREAT SERPL: 4.9 (ref 7–25)
CALCIUM SPEC-SCNC: 9 MG/DL (ref 8.6–10.5)
CHLORIDE SERPL-SCNC: 93 MMOL/L (ref 98–107)
CHOLEST SERPL-MCNC: 96 MG/DL (ref 0–200)
CK SERPL-CCNC: 60 U/L (ref 20–200)
CO2 SERPL-SCNC: 22.2 MMOL/L (ref 22–29)
CREAT SERPL-MCNC: 1.63 MG/DL (ref 0.76–1.27)
DEPRECATED RDW RBC AUTO: 46.6 FL (ref 37–54)
EOSINOPHIL # BLD MANUAL: 0.18 10*3/MM3 (ref 0–0.4)
EOSINOPHIL NFR BLD MANUAL: 2 % (ref 0.3–6.2)
ERYTHROCYTE [DISTWIDTH] IN BLOOD BY AUTOMATED COUNT: 18.2 % (ref 12.3–15.4)
GFR SERPL CREATININE-BSD FRML MDRD: 43 ML/MIN/1.73
GLOBULIN UR ELPH-MCNC: 3.8 GM/DL
GLUCOSE SERPL-MCNC: 96 MG/DL (ref 65–99)
HCT VFR BLD AUTO: 30.6 % (ref 37.5–51)
HDLC SERPL-MCNC: 34 MG/DL (ref 40–60)
HGB BLD-MCNC: 9.4 G/DL (ref 13–17.7)
LDLC SERPL CALC-MCNC: 40 MG/DL (ref 0–100)
LDLC/HDLC SERPL: 1.11 {RATIO}
LYMPHOCYTES # BLD MANUAL: 2.45 10*3/MM3 (ref 0.7–3.1)
LYMPHOCYTES NFR BLD MANUAL: 27.3 % (ref 19.6–45.3)
LYMPHOCYTES NFR BLD MANUAL: 4 % (ref 5–12)
MCH RBC QN AUTO: 22.2 PG (ref 26.6–33)
MCHC RBC AUTO-ENTMCNC: 30.7 G/DL (ref 31.5–35.7)
MCV RBC AUTO: 72.3 FL (ref 79–97)
MONOCYTES # BLD AUTO: 0.36 10*3/MM3 (ref 0.1–0.9)
NEUTROPHILS # BLD AUTO: 5.99 10*3/MM3 (ref 1.7–7)
NEUTROPHILS NFR BLD MANUAL: 66.7 % (ref 42.7–76)
NRBC BLD AUTO-RTO: 0 /100 WBC (ref 0–0.2)
NRBC SPEC MANUAL: 1 /100 WBC (ref 0–0.2)
PLAT MORPH BLD: NORMAL
PLATELET # BLD AUTO: 270 10*3/MM3 (ref 140–450)
PMV BLD AUTO: 10.5 FL (ref 6–12)
POIKILOCYTOSIS BLD QL SMEAR: ABNORMAL
POTASSIUM SERPL-SCNC: 4.8 MMOL/L (ref 3.5–5.2)
PROT SERPL-MCNC: 7.7 G/DL (ref 6–8.5)
RBC # BLD AUTO: 4.23 10*6/MM3 (ref 4.14–5.8)
SODIUM SERPL-SCNC: 124 MMOL/L (ref 136–145)
TRIGL SERPL-MCNC: 122 MG/DL (ref 0–150)
VLDLC SERPL-MCNC: 22 MG/DL (ref 5–40)
WBC # BLD AUTO: 8.98 10*3/MM3 (ref 3.4–10.8)
WBC MORPH BLD: NORMAL

## 2021-08-10 PROCEDURE — 85025 COMPLETE CBC W/AUTO DIFF WBC: CPT

## 2021-08-10 PROCEDURE — 85007 BL SMEAR W/DIFF WBC COUNT: CPT

## 2021-08-10 PROCEDURE — 80061 LIPID PANEL: CPT

## 2021-08-10 PROCEDURE — 80053 COMPREHEN METABOLIC PANEL: CPT

## 2021-08-10 PROCEDURE — 82550 ASSAY OF CK (CPK): CPT

## 2021-08-10 PROCEDURE — 36415 COLL VENOUS BLD VENIPUNCTURE: CPT

## 2021-08-13 ENCOUNTER — OFFICE VISIT (OUTPATIENT)
Dept: CARDIOLOGY | Facility: CLINIC | Age: 66
End: 2021-08-13

## 2021-08-13 VITALS
SYSTOLIC BLOOD PRESSURE: 138 MMHG | WEIGHT: 219 LBS | TEMPERATURE: 98.2 F | DIASTOLIC BLOOD PRESSURE: 76 MMHG | OXYGEN SATURATION: 99 % | HEART RATE: 59 BPM | BODY MASS INDEX: 30.66 KG/M2 | HEIGHT: 71 IN

## 2021-08-13 DIAGNOSIS — I10 ESSENTIAL HYPERTENSION: ICD-10-CM

## 2021-08-13 DIAGNOSIS — Z72.0 TOBACCO USE: ICD-10-CM

## 2021-08-13 DIAGNOSIS — E78.2 MIXED HYPERLIPIDEMIA: ICD-10-CM

## 2021-08-13 DIAGNOSIS — I25.10 CORONARY ARTERY DISEASE INVOLVING NATIVE CORONARY ARTERY OF NATIVE HEART WITHOUT ANGINA PECTORIS: Chronic | ICD-10-CM

## 2021-08-13 DIAGNOSIS — I25.10 CORONARY ARTERY DISEASE INVOLVING NATIVE CORONARY ARTERY OF NATIVE HEART WITHOUT ANGINA PECTORIS: Primary | Chronic | ICD-10-CM

## 2021-08-13 DIAGNOSIS — N18.31 STAGE 3A CHRONIC KIDNEY DISEASE (HCC): ICD-10-CM

## 2021-08-13 DIAGNOSIS — E87.1 HYPONATREMIA: ICD-10-CM

## 2021-08-13 PROBLEM — D64.9 ANEMIA: Status: ACTIVE | Noted: 2021-08-13

## 2021-08-13 PROCEDURE — 93000 ELECTROCARDIOGRAM COMPLETE: CPT | Performed by: INTERNAL MEDICINE

## 2021-08-13 PROCEDURE — 99214 OFFICE O/P EST MOD 30 MIN: CPT | Performed by: INTERNAL MEDICINE

## 2021-08-13 RX ORDER — LOSARTAN POTASSIUM 50 MG/1
25 TABLET ORAL DAILY
Qty: 90 TABLET | Refills: 1 | Status: SHIPPED | OUTPATIENT
Start: 2021-08-13

## 2021-08-13 RX ORDER — ROSUVASTATIN CALCIUM 5 MG/1
5 TABLET, COATED ORAL DAILY
Qty: 90 TABLET | Refills: 1 | Status: SHIPPED | OUTPATIENT
Start: 2021-08-13 | End: 2021-12-29

## 2021-08-13 NOTE — PROGRESS NOTES
subjective     Chief Complaint   Patient presents with   • Results     labs   • Coronary Artery Disease     Follow up   • Med Refill     pending       History of Present Illness  Patient is 66 years old white male who is here because he ran out of his Crestor.  He missed his last appointment, he was urged to have lab work done and then to be seen at the office.  Patient is here to discuss the results.      Patient has known coronary artery disease with history of inferior wall myocardial infarction multiple coronary stents.  Currently denies any chest pain or palpitations.  Patient  went to the hospital on 4/13/2021 with chest pain but he signed out AMA.  Patient again went to the hospital on April 28, 2021 and left without being triaged    Blood pressure according to him is very well controlled    He states that he has not had any chest discomfort he is doing very well from cardiac standpoint but he states that he has no appetite and is losing weight.  He also is anemic and cannot take iron pills which makes him nauseated and constipated.      Past Surgical History:   Procedure Laterality Date   • CARDIAC CATHETERIZATION  03/03/2014   • CARDIAC SURGERY     • CARDIOVASCULAR STRESS TEST  04/04/2013   • CATARACT EXTRACTION     • CEREBRAL ANEURYSM REPAIR     • COLONOSCOPY     • CORONARY ANGIOPLASTY WITH STENT PLACEMENT     • CYSTOSCOPY LITHOLAPAXY BLADDER STONE EXTRACTION N/A 11/29/2017    Procedure: CYSTOSCOPY LITHOLAPAXY BLADDER STONE EXTRACTION;  Surgeon: Flavio Barrios MD;  Location: [facility];  Service:    • ECHO - CONVERTED  02/02/2014   • EYE SURGERY     • INNER EAR SURGERY     • SKIN BIOPSY     • TONSILLECTOMY     • URETHRAL DILATATION N/A 6/1/2020    Procedure: CYSTOSCOPY URETHRAL DILATATION;  Surgeon: Flavio Barrios MD;  Location: [facility];  Service: Urology;  Laterality: N/A;     Family History   Problem Relation Age of Onset   • Osteoporosis Mother    • Cancer Mother         breast and  lung   • Cancer Father         small cell cancer, kidney   • Heart disease Father    • Diabetes Father    • No Known Problems Sister    • Aneurysm Brother      Past Medical History:   Diagnosis Date   • Arthritis    • Bladder stones    • BPH (benign prostatic hyperplasia)    • Brain aneurysm     1987   • Brain aneurysm 1987   • Cancer (CMS/[facility])     skin ca- felix   • Claustrophobia    • COPD (chronic obstructive pulmonary disease) (CMS/[facility])    • Coronary artery disease    • Diabetes (CMS/[facility])    • Difficulty urinating    • Fracture, finger    • GERD (gastroesophageal reflux disease)    • Hematuria    • High cholesterol    • History of sepsis    • Hypertension    • MI (myocardial infarction) (CMS/[facility])     2013   • Neuropathy     FEET AND LEGS   • Prostate disease    • Stroke (CMS/[facility])     X3 last one 9/2016   TIA   • TIA (transient ischemic attack)    • Unsteady gait    • UTI (urinary tract infection)      Patient Active Problem List   Diagnosis   • Left wrist pain   • Diabetes (CMS/[facility])   • Prostate disease   • COPD (chronic obstructive pulmonary disease) (CMS/[facility])   • History of TIA (transient ischemic attack)   • Ganglion cyst of wrist   • Frequency of micturition   • Claustrophobia   • Blood in the urine   • Incomplete bladder emptying   • Coronary artery disease, inferior wall myocardial infarction 2013, bare metal stent to RCA, drug-eluting stent ×2 to LAD and balloon angioplasty of the medial trunk of the LAD diagonal 2014   • Hypertension   • Hyperlipidemia   • Tobacco use   • Gross hematuria   • Urinary retention   • Bladder stones   • Surgical aftercare, genitourinary system   • Cutaneous abscess of neck   • Acute cystitis without hematuria   • Dysuria   • Urethral stricture due to infection   • Urinary incontinence without sensory awareness   • Urinary tract infection without hematuria   • Chronic cystitis   • Chest pain in adult   • Hyponatremia   • Stage 3a chronic kidney disease (CMS/[facility])   • Anemia        Social History     Tobacco Use   • Smoking status: Current Every Day Smoker     Packs/day: 1.00     Years: 45.00     Pack years: 45.00     Types: Cigarettes   • Smokeless tobacco: Never Used   Substance Use Topics   • Alcohol use: No   • Drug use: No       No Known Allergies    Current Outpatient Medications on File Prior to Visit   Medication Sig   • aspirin  MG tablet Take 325 mg by mouth Every 6 (Six) Hours As Needed.   • diclofenac (VOLTAREN) 75 MG EC tablet Take 75 mg by mouth 2 (Two) Times a Day.   • doxazosin (CARDURA) 8 MG tablet Take 1 tablet by mouth Daily.   • finasteride (PROSCAR) 5 MG tablet Take 1 tablet by mouth Daily.   • gabapentin (NEURONTIN) 600 MG tablet 2,400 mg Every Night.   • meloxicam (MOBIC) 7.5 MG tablet Take 7.5 mg by mouth Daily.   • metFORMIN (GLUCOPHAGE) 500 MG tablet Take 500 mg by mouth 2 (Two) Times a Day With Meals.   • Mirabegron ER (Myrbetriq) 50 MG tablet sustained-release 24 hour 24 hr tablet Take 50 mg by mouth Daily.   • nitrofurantoin, macrocrystal-monohydrate, (MACROBID) 100 MG capsule Take 1 capsule by mouth Daily.   • oxybutynin (DITROPAN) 5 MG tablet Take 1 tablet by mouth 2 (Two) Times a Day.   • pantoprazole (PROTONIX) 40 MG EC tablet Take 40 mg by mouth 2 (two) times a day.   • traMADol (ULTRAM) 50 MG tablet Take 100 mg by mouth Every Night.   • zolpidem (AMBIEN) 10 MG tablet Take 10 mg by mouth At Night As Needed for Sleep.   • [DISCONTINUED] losartan (COZAAR) 50 MG tablet Take 1 tablet by mouth Daily.   • [DISCONTINUED] rosuvastatin (CRESTOR) 5 MG tablet TAKE ONE TABLET BY MOUTH DAILY   • metoprolol succinate XL (TOPROL-XL) 25 MG 24 hr tablet Take 25 mg by mouth 2 (two) times a day.   • solifenacin (VESICARE) 10 MG tablet Take 1 tablet by mouth Daily.   • [DISCONTINUED] glimepiride (AMARYL) 4 MG tablet Take 4 mg by mouth Every Morning Before Breakfast.     No current facility-administered medications on file prior to visit.         The following portions  "of the patient's history were reviewed and updated as appropriate: allergies, current medications, past family history, past medical history, past social history, past surgical history and problem list.    Review of Systems   Constitutional: Positive for decreased appetite, malaise/fatigue and weight loss.   HENT: Negative.  Negative for congestion.    Eyes: Negative.    Cardiovascular: Negative.  Negative for chest pain, cyanosis, dyspnea on exertion, irregular heartbeat, leg swelling, near-syncope, orthopnea, palpitations, paroxysmal nocturnal dyspnea and syncope.   Respiratory: Negative.  Negative for shortness of breath.    Hematologic/Lymphatic: Negative.    Musculoskeletal: Negative.    Gastrointestinal: Negative.    Neurological: Negative.  Negative for headaches.          Objective:     /76   Pulse 59   Temp 98.2 °F (36.8 °C)   Ht 180.3 cm (71\")   Wt 99.3 kg (219 lb)   SpO2 99%   BMI 30.54 kg/m²   Pulmonary:      Effort: Pulmonary effort is normal.      Breath sounds: No stridor. No wheezing. No rhonchi. No rales.   Cardiovascular:      PMI at left midclavicular line. Normal rate. Regular rhythm. Normal S1. Normal S2.      Murmurs: There is no murmur.      No gallop. No click. No rub.   Pulses:     Intact distal pulses.   Edema:     Peripheral edema absent.           Lab Review  Lab Results   Component Value Date     (L) 08/10/2021    K 4.8 08/10/2021    CL 93 (L) 08/10/2021    BUN 8 08/10/2021    CREATININE 1.63 (H) 08/10/2021    GLUCOSE 96 08/10/2021     (H) 02/06/2020    CALCIUM 9.0 08/10/2021    ALT 8 08/10/2021    ALKPHOS 102 08/10/2021     Lab Results   Component Value Date    CKTOTAL 60 08/10/2021     Lab Results   Component Value Date    WBC 8.98 08/10/2021    HGB 9.4 (L) 08/10/2021    HCT 30.6 (L) 08/10/2021     08/10/2021     Lab Results   Component Value Date    INR 1.01 12/21/2019    INR 1.00 04/20/2018    INR 1.02 11/13/2017     Lab Results   Component Value Date "    MG 1.9 09/24/2020     Lab Results   Component Value Date    PSA 1.470 02/06/2020    TSH 2.850 09/24/2020     Lab Results   Component Value Date    BNP 27.0 09/04/2018     Lab Results   Component Value Date    CHLPL 101 03/26/2014    CHOL 96 08/10/2021    TRIG 122 08/10/2021    HDL 34 (L) 08/10/2021    VLDL 22 08/10/2021    LDLHDL 1.11 08/10/2021     Lab Results   Component Value Date    LDL 40 08/10/2021    LDL 54 03/26/2014         ECG 12 Lead    Date/Time: 8/13/2021 1:21 PM  Performed by: Heber Tejada MD  Authorized by: Heber Tejada MD   Comparison: compared with previous ECG from 4/13/2021  Comparison to previous ECG: Heart rate changed from 70-59  Rhythm: sinus rhythm and sinus bradycardia  Rate: bradycardic  BPM: 59  Conduction: conduction normal  ST Segments: ST segments normal  QRS axis: normal    Clinical impression: abnormal EKG and myocardial infarction  Comments: Old inferior myocardial infarction               I personally viewed and interpreted the patient's LAB data         Assessment:     1. Coronary artery disease involving native coronary artery of native heart without angina pectoris    2. Mixed hyperlipidemia    3. Hyponatremia    4. Stage 3a chronic kidney disease (CMS/HCC)    5. Tobacco use    6. Essential hypertension          Plan:     Hyperlipidemia  Lab work reviewed  Lipid panel is normal, patient was advised to continue Crestor 5 mg daily.  Refill was given.  Number function and CK are also normal.    Coronary artery disease status post inferior wall myocardial infarction.  Patient is doing very well from cardiac standpoint denies any chest tightness or pressure sensation no orthopnea or PND.  He will continue metoprolol XL 25 mg twice daily.  He has mild bradycardia heart rate 59 but he is asymptomatic metoprolol will be continued.    Blood pressure  Blood pressure is mildly low and sodium is low for that reason losartan was decreased to 25 mg daily.    Renal  functions are abnormal patient was advised to stop Mobic and have nephrology evaluation patient will talk to Dr. Espinal.    Anemia  Patient is significantly anemic he is taking Mobic and could be having iron deficiency anemia however it could be chronic disease anemia from renal failure.  He is getting iron replacement therapy orally but is not taking it because it caused him to be constipated.  He needs further work-up for anemia I will discuss that with Dr. Espinal.    Losartan was decreased to 25 mg daily and will check his CMP in 1 month to check for renal functions and electrolytes.    Cessation of tobacco use was very strongly urged.  Follow-up scheduled    No follow-ups on file.

## 2021-09-08 DIAGNOSIS — N32.81 OVERACTIVE BLADDER: ICD-10-CM

## 2021-09-09 RX ORDER — SOLIFENACIN SUCCINATE 10 MG/1
TABLET, FILM COATED ORAL
Qty: 30 TABLET | Refills: 2 | Status: SHIPPED | OUTPATIENT
Start: 2021-09-09 | End: 2021-12-07 | Stop reason: SDUPTHER

## 2021-09-16 ENCOUNTER — LAB (OUTPATIENT)
Dept: LAB | Facility: HOSPITAL | Age: 66
End: 2021-09-16

## 2021-09-16 DIAGNOSIS — E87.1 HYPONATREMIA: ICD-10-CM

## 2021-09-16 PROCEDURE — 36415 COLL VENOUS BLD VENIPUNCTURE: CPT

## 2021-09-16 PROCEDURE — 80053 COMPREHEN METABOLIC PANEL: CPT

## 2021-09-17 ENCOUNTER — TELEPHONE (OUTPATIENT)
Dept: CARDIOLOGY | Facility: CLINIC | Age: 66
End: 2021-09-17

## 2021-09-17 LAB
ALBUMIN SERPL-MCNC: 3.9 G/DL (ref 3.5–5.2)
ALBUMIN/GLOB SERPL: 1.3 G/DL
ALP SERPL-CCNC: 87 U/L (ref 39–117)
ALT SERPL W P-5'-P-CCNC: 8 U/L (ref 1–41)
ANION GAP SERPL CALCULATED.3IONS-SCNC: 9.5 MMOL/L (ref 5–15)
AST SERPL-CCNC: 12 U/L (ref 1–40)
BILIRUB SERPL-MCNC: 0.2 MG/DL (ref 0–1.2)
BUN SERPL-MCNC: 8 MG/DL (ref 8–23)
BUN/CREAT SERPL: 5.7 (ref 7–25)
CALCIUM SPEC-SCNC: 8.8 MG/DL (ref 8.6–10.5)
CHLORIDE SERPL-SCNC: 94 MMOL/L (ref 98–107)
CO2 SERPL-SCNC: 20.5 MMOL/L (ref 22–29)
CREAT SERPL-MCNC: 1.41 MG/DL (ref 0.76–1.27)
GFR SERPL CREATININE-BSD FRML MDRD: 50 ML/MIN/1.73
GLOBULIN UR ELPH-MCNC: 3.1 GM/DL
GLUCOSE SERPL-MCNC: 95 MG/DL (ref 65–99)
POTASSIUM SERPL-SCNC: 4.7 MMOL/L (ref 3.5–5.2)
PROT SERPL-MCNC: 7 G/DL (ref 6–8.5)
SODIUM SERPL-SCNC: 124 MMOL/L (ref 136–145)

## 2021-09-17 NOTE — TELEPHONE ENCOUNTER
----- Message from Heber Tejada MD sent at 9/17/2021  9:03 AM EDT -----  Kidney functions are slightly betterSodium still very low.  Make sure you are seeing a kidney doctor.  Nephrologist not urologistDr. Uriarte is a urologist not nephrologist, Dr. [facility] is nephrology group.  Please discussed this with Dr. Espinal

## 2021-09-17 NOTE — TELEPHONE ENCOUNTER
Called and given message per DR Tejada.  He stated he has an appt with a Kidney Dr From [place] on the 30th of Sept.

## 2021-09-30 ENCOUNTER — TRANSCRIBE ORDERS (OUTPATIENT)
Dept: ADMINISTRATIVE | Facility: HOSPITAL | Age: 66
End: 2021-09-30

## 2021-09-30 DIAGNOSIS — N17.9 ACUTE RENAL FAILURE, UNSPECIFIED ACUTE RENAL FAILURE TYPE (HCC): Primary | ICD-10-CM

## 2021-11-04 ENCOUNTER — TRANSCRIBE ORDERS (OUTPATIENT)
Dept: ADMINISTRATIVE | Facility: HOSPITAL | Age: 66
End: 2021-11-04

## 2021-11-04 ENCOUNTER — HOSPITAL ENCOUNTER (OUTPATIENT)
Dept: ULTRASOUND IMAGING | Facility: HOSPITAL | Age: 66
Discharge: HOME | End: 2021-11-04

## 2021-11-04 ENCOUNTER — LAB (OUTPATIENT)
Dept: LAB | Facility: HOSPITAL | Age: 66
End: 2021-11-04

## 2021-11-04 DIAGNOSIS — N17.9 ACUTE RENAL FAILURE, UNSPECIFIED ACUTE RENAL FAILURE TYPE (HCC): ICD-10-CM

## 2021-11-04 DIAGNOSIS — N17.9 ACUTE RENAL FAILURE, UNSPECIFIED ACUTE RENAL FAILURE TYPE (HCC): Primary | ICD-10-CM

## 2021-11-04 PROCEDURE — 36415 COLL VENOUS BLD VENIPUNCTURE: CPT

## 2021-11-04 PROCEDURE — 80053 COMPREHEN METABOLIC PANEL: CPT

## 2021-11-04 PROCEDURE — 76775 US EXAM ABDO BACK WALL LIM: CPT

## 2021-11-04 PROCEDURE — 76775 US EXAM ABDO BACK WALL LIM: CPT | Performed by: RADIOLOGY

## 2021-11-05 ENCOUNTER — LAB (OUTPATIENT)
Dept: LAB | Facility: HOSPITAL | Age: 66
End: 2021-11-05

## 2021-11-05 DIAGNOSIS — N17.9 ACUTE RENAL FAILURE, UNSPECIFIED ACUTE RENAL FAILURE TYPE (HCC): ICD-10-CM

## 2021-11-05 LAB
ALBUMIN SERPL-MCNC: 4.2 G/DL (ref 3.5–5.2)
ALBUMIN UR-MCNC: 2.2 MG/DL
ALBUMIN/GLOB SERPL: 1.1 G/DL
ALP SERPL-CCNC: 96 U/L (ref 39–117)
ALT SERPL W P-5'-P-CCNC: 8 U/L (ref 1–41)
ANION GAP SERPL CALCULATED.3IONS-SCNC: 13.7 MMOL/L (ref 5–15)
AST SERPL-CCNC: 15 U/L (ref 1–40)
BACTERIA UR QL AUTO: ABNORMAL /HPF
BILIRUB SERPL-MCNC: 0.2 MG/DL (ref 0–1.2)
BILIRUB UR QL STRIP: NEGATIVE
BUN SERPL-MCNC: 7 MG/DL (ref 8–23)
BUN/CREAT SERPL: 5.3 (ref 7–25)
CALCIUM SPEC-SCNC: 9.1 MG/DL (ref 8.6–10.5)
CHLORIDE SERPL-SCNC: 94 MMOL/L (ref 98–107)
CLARITY UR: CLEAR
CO2 SERPL-SCNC: 21.3 MMOL/L (ref 22–29)
COLOR UR: YELLOW
CREAT SERPL-MCNC: 1.33 MG/DL (ref 0.76–1.27)
CREAT UR-MCNC: 34.4 MG/DL
CREAT UR-MCNC: 34.4 MG/DL
GFR SERPL CREATININE-BSD FRML MDRD: 54 ML/MIN/1.73
GLOBULIN UR ELPH-MCNC: 3.7 GM/DL
GLUCOSE SERPL-MCNC: 156 MG/DL (ref 65–99)
GLUCOSE UR STRIP-MCNC: NEGATIVE MG/DL
HGB UR QL STRIP.AUTO: ABNORMAL
HYALINE CASTS UR QL AUTO: ABNORMAL /LPF
KETONES UR QL STRIP: NEGATIVE
LEUKOCYTE ESTERASE UR QL STRIP.AUTO: ABNORMAL
MICROALBUMIN/CREAT UR: 64 MG/G
NITRITE UR QL STRIP: NEGATIVE
PH UR STRIP.AUTO: 7 [PH] (ref 5–8)
POTASSIUM SERPL-SCNC: 4.3 MMOL/L (ref 3.5–5.2)
PROT SERPL-MCNC: 7.9 G/DL (ref 6–8.5)
PROT UR QL STRIP: NEGATIVE
PROT UR-MCNC: 9 MG/DL
PROT/CREAT UR: 261.6 MG/G CREA (ref 0–200)
RBC # UR: ABNORMAL /HPF
REF LAB TEST METHOD: ABNORMAL
SODIUM SERPL-SCNC: 129 MMOL/L (ref 136–145)
SP GR UR STRIP: 1.01 (ref 1–1.03)
SQUAMOUS #/AREA URNS HPF: ABNORMAL /HPF
UROBILINOGEN UR QL STRIP: ABNORMAL
WBC UR QL AUTO: ABNORMAL /HPF

## 2021-11-05 PROCEDURE — 84156 ASSAY OF PROTEIN URINE: CPT

## 2021-11-05 PROCEDURE — 82043 UR ALBUMIN QUANTITATIVE: CPT

## 2021-11-05 PROCEDURE — 82570 ASSAY OF URINE CREATININE: CPT

## 2021-11-05 PROCEDURE — 81001 URINALYSIS AUTO W/SCOPE: CPT

## 2021-12-02 DIAGNOSIS — R35.0 BENIGN PROSTATIC HYPERPLASIA WITH URINARY FREQUENCY: ICD-10-CM

## 2021-12-02 DIAGNOSIS — N40.1 BENIGN PROSTATIC HYPERPLASIA WITH URINARY FREQUENCY: ICD-10-CM

## 2021-12-02 RX ORDER — FINASTERIDE 5 MG/1
TABLET, FILM COATED ORAL
Qty: 90 TABLET | Refills: 3 | Status: SHIPPED | OUTPATIENT
Start: 2021-12-02 | End: 2021-12-07 | Stop reason: SDUPTHER

## 2021-12-07 ENCOUNTER — OFFICE VISIT (OUTPATIENT)
Dept: UROLOGY | Facility: CLINIC | Age: 66
End: 2021-12-07

## 2021-12-07 VITALS — BODY MASS INDEX: 30.52 KG/M2 | WEIGHT: 218 LBS | HEIGHT: 71 IN

## 2021-12-07 DIAGNOSIS — R35.0 FREQUENCY OF MICTURITION: ICD-10-CM

## 2021-12-07 DIAGNOSIS — R33.9 INCOMPLETE BLADDER EMPTYING: ICD-10-CM

## 2021-12-07 DIAGNOSIS — R35.0 BENIGN PROSTATIC HYPERPLASIA WITH URINARY FREQUENCY: Primary | ICD-10-CM

## 2021-12-07 DIAGNOSIS — N40.1 BENIGN PROSTATIC HYPERPLASIA WITH URINARY FREQUENCY: Primary | ICD-10-CM

## 2021-12-07 DIAGNOSIS — N32.81 OVERACTIVE BLADDER: ICD-10-CM

## 2021-12-07 DIAGNOSIS — N39.0 RECURRENT UTI: ICD-10-CM

## 2021-12-07 LAB
BILIRUB BLD-MCNC: NEGATIVE MG/DL
CLARITY, POC: ABNORMAL
COLOR UR: YELLOW
EXPIRATION DATE: ABNORMAL
GLUCOSE UR STRIP-MCNC: NEGATIVE MG/DL
KETONES UR QL: NEGATIVE
LEUKOCYTE EST, POC: ABNORMAL
Lab: ABNORMAL
NITRITE UR-MCNC: NEGATIVE MG/ML
PH UR: 6 [PH] (ref 5–8)
PROT UR STRIP-MCNC: NEGATIVE MG/DL
RBC # UR STRIP: ABNORMAL /UL
SP GR UR: 1.01 (ref 1–1.03)
UROBILINOGEN UR QL: NORMAL

## 2021-12-07 PROCEDURE — 99214 OFFICE O/P EST MOD 30 MIN: CPT | Performed by: NURSE PRACTITIONER

## 2021-12-07 PROCEDURE — 51798 US URINE CAPACITY MEASURE: CPT | Performed by: NURSE PRACTITIONER

## 2021-12-07 PROCEDURE — 87086 URINE CULTURE/COLONY COUNT: CPT | Performed by: NURSE PRACTITIONER

## 2021-12-07 PROCEDURE — 84154 ASSAY OF PSA FREE: CPT | Performed by: NURSE PRACTITIONER

## 2021-12-07 PROCEDURE — 84153 ASSAY OF PSA TOTAL: CPT | Performed by: NURSE PRACTITIONER

## 2021-12-07 PROCEDURE — 81003 URINALYSIS AUTO W/O SCOPE: CPT | Performed by: NURSE PRACTITIONER

## 2021-12-07 RX ORDER — OXYBUTYNIN CHLORIDE 5 MG/1
5 TABLET ORAL 2 TIMES DAILY
Qty: 60 TABLET | Refills: 4 | Status: SHIPPED | OUTPATIENT
Start: 2021-12-07 | End: 2022-01-01

## 2021-12-07 RX ORDER — SOLIFENACIN SUCCINATE 10 MG/1
10 TABLET, FILM COATED ORAL DAILY
Qty: 90 TABLET | Refills: 5 | Status: SHIPPED | OUTPATIENT
Start: 2021-12-07

## 2021-12-07 RX ORDER — NITROFURANTOIN 25; 75 MG/1; MG/1
100 CAPSULE ORAL DAILY
Qty: 90 CAPSULE | Refills: 5 | Status: SHIPPED | OUTPATIENT
Start: 2021-12-07 | End: 2022-01-01

## 2021-12-07 RX ORDER — NITROGLYCERIN 0.4 MG/1
0.4 TABLET SUBLINGUAL
COMMUNITY

## 2021-12-07 RX ORDER — FINASTERIDE 5 MG/1
5 TABLET, FILM COATED ORAL DAILY
Qty: 90 TABLET | Refills: 3 | Status: SHIPPED | OUTPATIENT
Start: 2021-12-07

## 2021-12-07 RX ORDER — ERGOCALCIFEROL 1.25 MG/1
50000 CAPSULE ORAL WEEKLY
COMMUNITY

## 2021-12-07 RX ORDER — ONDANSETRON HYDROCHLORIDE 8 MG/1
TABLET, FILM COATED ORAL AS NEEDED
COMMUNITY
Start: 2021-11-22

## 2021-12-07 NOTE — PROGRESS NOTES
Chief Complaint  BPH WITH Recurrent UTIOAB/DETRUSSOR INSTABILITY (FOLLOW UP/REFILLS/PSA)    Subjective          Sarah Rubio presents to [facility]  History of Present Illness    MR. SARAH RUBIO IS  A VERY PLEASANTR 65 y.o. male with a significant history of BPH WITH recurrent utis, Urine incontinence with incomplete bladder emptying, who returns to clinic today for HIS 6 MONTH FOLLOW UP.  He is in no apparent distress and reports doing better with his urinary incontinent episodes since starting Vesicare 10 mg nightly, and Ditropan 5 mg in the mornings.  He has extensive polypharmacy.     The  patient is  ALSO being evaluated in clinic today for BPH with lower urinary tract symptoms: Urinary frequency and nocturia that have remained very bothersome to him. He is also here for his yearly prostate exam and also refills of his medications.  His most recent PSA was 1.471 on 02/06/2020, his PSA prior was 2.5.  Patient is currently on finasteride and denies any side effects from medication. He denies any urinary symptoms of urgency or dysuria.  He is mostly incontinent but denies any burning on urination.  Denies pelvic pressure or supra pubic discomfort.       He also reports currently doing better on antibiotic prophylaxis with Macrobid and would like to continue. He also reports a significant improvement in his urinary symptoms.  His last urine culture was also negative, He has had numerous negative cultures, patient has not had a UTI in the last almost 9 months and he is very appreciative for suppressive therapy. His urine sample today show 3+ leukocyte Esterace, Negative nitrites, 3+ microscopic hematuria microhematuria, his IPSS score is 7, his PVR is 0      He had significant urinary incontinence without sensory awareness most of his days,But reports this too has improved and he now only uses 1-2 depends at nighttime only,  compared to 6-8 prior. He has chronic back  "pain, but denies abdominal pain, flank pain or any CVA tenderness. Denies pelvic pain and pressure.  He denies fever or chills, he does not have nausea, vomiting, or diarrhea.     Overall he reports doing better, he states he is currently taking his antibiotics and probiotics diligently.  He reports  increasing his p.o. fluid intake to at least 2 L daily, and has significantly reduced his intake of soda drinks, bladder irritants.     Objective   Vital Signs:   Ht 180.3 cm (71\")   Wt 98.9 kg (218 lb)   BMI 30.40 kg/m²     Physical Exam  Constitutional:       General: He is in acute distress.      Appearance: He is well-developed. He is obese. He is ill-appearing.   HENT:      Head: Normocephalic and atraumatic.      Right Ear: External ear normal.      Left Ear: External ear normal.   Eyes:      General:         Right eye: No discharge.         Left eye: No discharge.      Conjunctiva/sclera: Conjunctivae normal.      Pupils: Pupils are equal, round, and reactive to light.   Neck:      Thyroid: No thyromegaly.      Trachea: No tracheal deviation.   Cardiovascular:      Rate and Rhythm: Normal rate and regular rhythm.      Heart sounds: No murmur heard.  No friction rub.   Pulmonary:      Effort: Pulmonary effort is normal. No respiratory distress.      Breath sounds: Normal breath sounds. No stridor.   Abdominal:      General: Bowel sounds are normal. There is distension.      Palpations: Abdomen is soft.      Tenderness: There is abdominal tenderness. There is no guarding.   Genitourinary:     Penis: Normal and uncircumcised. No tenderness or discharge.       Testes: Normal.      Rectum: Normal. Guaiac result negative.      Comments: JAYDEN negative for nodules, He has good rectal tone, and enlarged but smooth firm prostate. There is no nodularity or any suspicious rectal abnormalities.   otherwise normal external genitalia. Bilateral descended testes without any masses, left slightly  and hanging lower than the " right testicles. There are no inguinal hernias or abnormalities noted.     Musculoskeletal:         General: Tenderness present. No deformity. Normal range of motion.      Cervical back: Normal range of motion and neck supple.   Skin:     General: Skin is warm and dry.      Capillary Refill: Capillary refill takes less than 2 seconds.      Coloration: Skin is pale.   Neurological:      Mental Status: He is alert and oriented to person, place, and time.      Cranial Nerves: No cranial nerve deficit.      Coordination: Coordination normal.   Psychiatric:         Behavior: Behavior normal.         Thought Content: Thought content normal.         Judgment: Judgment normal.        Result Review :     CMP    CMP 8/10/21 9/16/21 11/4/21   Glucose 96 95 156 (A)   BUN 8 8 7 (A)   Creatinine 1.63 (A) 1.41 (A) 1.33 (A)   eGFR Non  Am 43 (A) 50 (A) 54 (A)   Sodium 124 (A) 124 (A) 129 (A)   Potassium 4.8 4.7 4.3   Chloride 93 (A) 94 (A) 94 (A)   Calcium 9.0 8.8 9.1   Albumin 3.90 3.90 4.20   Total Bilirubin 0.3 0.2 0.2   Alkaline Phosphatase 102 87 96   AST (SGOT) 11 12 15   ALT (SGPT) 8 8 8   (A) Abnormal value            CBC w/diff    CBC w/Diff 4/13/21 8/10/21   WBC 8.21 8.98   RBC 3.83 (A) 4.23   Hemoglobin 9.1 (A) 9.4 (A)   Hematocrit 29.3 (A) 30.6 (A)   MCV 76.5 (A) 72.3 (A)   MCH 23.8 (A) 22.2 (A)   MCHC 31.1 (A) 30.7 (A)   RDW 18.4 (A) 18.2 (A)   Platelets 217 270   Neutrophil Rel % 58.4    Immature Granulocyte Rel % 0.6 (A)    Lymphocyte Rel % 29.4    Monocyte Rel % 8.9    Eosinophil Rel % 2.2    Basophil Rel % 0.5    (A) Abnormal value            Renal Profile    Renal Profile 8/10/21 9/16/21 11/4/21   BUN 8 8 7 (A)   Creatinine 1.63 (A) 1.41 (A) 1.33 (A)   eGFR Non  Am 43 (A) 50 (A) 54 (A)   (A) Abnormal value            UA    Urinalysis 6/1/21 11/5/21 11/5/21 12/7/21     1051 1051    Squamous Epithelial Cells, UA   0-2    Specific Gravity, UA  1.007     Ketones, UA Negative Negative  Negative   Blood,  UA  Small (1+) (A)     Leukocytes, UA Large (3+) (A) Large (3+) (A)  Large (3+) (A)   Nitrite, UA  Negative     RBC, UA   0-2    WBC, UA   13-20 (A)    Bacteria, UA   None Seen    (A) Abnormal value       Comments are available for some flowsheets but are not being displayed.           Urine Culture    Urine Culture 2/9/21 6/1/21   Urine Culture No growth No growth           PSA    PSA 12/7/21   PSA 1.2      Comments are available for some flowsheets but are not being displayed.           Covid Tests    Common Labsle 4/13/21   COVID19 Not Detected                     Assessment and Plan    Diagnoses and all orders for this visit:    1. Benign prostatic hyperplasia with urinary frequency (Primary)  -     finasteride (PROSCAR) 5 MG tablet; Take 1 tablet by mouth Daily.  Dispense: 90 tablet; Refill: 3  -     PSA, Total & Free    2. Recurrent UTI  -     Urine Culture - Urine, Urine, Random Void  -     nitrofurantoin, macrocrystal-monohydrate, (MACROBID) 100 MG capsule; Take 1 capsule by mouth Daily.  Dispense: 90 capsule; Refill: 5  -     oxybutynin (DITROPAN) 5 MG tablet; Take 1 tablet by mouth 2 (Two) Times a Day.  Dispense: 60 tablet; Refill: 4  -     solifenacin (VESICARE) 10 MG tablet; Take 1 tablet by mouth Daily.  Dispense: 90 tablet; Refill: 5    3. Frequency of micturition  -     POC Urinalysis Dipstick, Automated  -     finasteride (PROSCAR) 5 MG tablet; Take 1 tablet by mouth Daily.  Dispense: 90 tablet; Refill: 3  -     nitrofurantoin, macrocrystal-monohydrate, (MACROBID) 100 MG capsule; Take 1 capsule by mouth Daily.  Dispense: 90 capsule; Refill: 5  -     oxybutynin (DITROPAN) 5 MG tablet; Take 1 tablet by mouth 2 (Two) Times a Day.  Dispense: 60 tablet; Refill: 4  -     solifenacin (VESICARE) 10 MG tablet; Take 1 tablet by mouth Daily.  Dispense: 90 tablet; Refill: 5    4. Incomplete bladder emptying  -     Bladder Scan  -     finasteride (PROSCAR) 5 MG tablet; Take 1 tablet by mouth Daily.  Dispense: 90  tablet; Refill: 3  -     PSA, Total & Free    5. Overactive bladder  -     nitrofurantoin, macrocrystal-monohydrate, (MACROBID) 100 MG capsule; Take 1 capsule by mouth Daily.  Dispense: 90 capsule; Refill: 5  -     oxybutynin (DITROPAN) 5 MG tablet; Take 1 tablet by mouth 2 (Two) Times a Day.  Dispense: 60 tablet; Refill: 4  -     solifenacin (VESICARE) 10 MG tablet; Take 1 tablet by mouth Daily.  Dispense: 90 tablet; Refill: 5                                            ASSESSMENT  BPH/ Recurrent urinary tract infections/Overactive Bladder/urine Incontinence: Very pleasant, ill-looking 65-year-old male returns to clinic today for follow-up on recurrent UTI's, and Urinary incontinence episodes that have remained very bothersome to him.      He however reports doing relatively better, with decreased urinary incontinent episodes, and decreased bladder symptoms consistent with UTIs.  Patient has had negative urine cultures for almost 9 months. The patient reports doing relatively better on his antibiotic prophylaxis, has resumed his Myrbetriq daily, and would like to continue.      Again, we discussed the types of organisms that are found in the urinary tract indicating that the vast majority are results of the patient's own gastrointestinal jerrica.     We discussed the risk factors for recurrent infections being intercourse in younger patients and atrophic changes in older patients.  We discussed the symptoms that are found including pain, pressure, burning, frequency, urgency suprapubic pain and painful intercourse. I discussed upper tract symptoms including fevers, chills, and indicated the workup would be much more aggressive if the patient were to present with recurrent infections in the face of upper tract symptomatology such as fever.     Urinary Incontinence: We discussed overactive bladder.  Although significantly improved on Myrbetriq 50 mg daily, the patient remains very bothered by his symptoms. We discussed  treatable and non-treatable causes of both stress and urge urinary incontinence.  With regards to stress urinary incontinence we discussed its relationship to childbirth in females and pelvic health in both males and females.  We discussed the grading of stress incontinence with trying to quantitate the number of pull ups used daily(he is down to 2-3 daily compared to 6-8 prior).  We talked about leaking urine with laughing, lifting, coughing, and sexual intercourse.  Next we discussed the various therapeutic options including anticholinergics, beta 3 agonists, and alpha blockade if there is a component of obstruction.  I discussed the side effects of anti-cholinergic including dry mouth, double vision.    FINALLY BPH: WE Discussed the pathophysiology of BPH and obstruction. We discussed the static and dynamic effects of BPH as well as using 5 alpha reductase inhibitors versus alpha blockade.  We discussed the indications for transurethral surgery as well.  And/ or other therapeutic options available including all of the newer techniques.  He has NO real symptomatology referable to his prostate other than moderate enlargement.  Rather than proceed with an expensive workup he doesn't need, at this time I am recommending he continue with his alpha reductase inhibitor-finasteride 5 mg daily.                                          PLAN  We resent his urine for culture, I will will call him with results if any bacterial growth resistant to current therapy.     We discussed he continue his antibiotic prophylaxis with Macrobid daily as directed.  He should increase his p.o. fluid intake to at least 1 to 2 L daily and avoid bladder irritants such as caffeine products, spicy foods, and citrusy foods.      I recommend concomitant probiotics with treatment with antibiotics to protect the rectal reservoir including over-the-counter yogurt preparations to marcy oral pills containing the appropriate probiotics. Patient reports  the diligent use of Probiotics.     He should also continue his Vesicare 10 mg as prescribed AT BEDTIME    We checked his PSA today free and total I will call him with results.      Continue  finasteride 5 mg daily,     Will see him back in  for Follow up in clinic and recheck his urinalysis at that time/JAYDEN/PSA and med refill.     We discussed Botox injections if his symptoms would resolve at this time-patient declined     Patient is agreeable to plan of care.     Patient reports that he is not currently experiencing any symptoms of urinary incontinence.     Patient's Body mass index is 33.23 kg/m². BMI is above normal parameters. Recommendations include: educational material, exercise counseling and nutrition counseling.     Smoking Cessation Counseling:  Current every day smoker. less than 3 minutes spent counseling. Has reduced tobacco use.  I advised patient to quit tobacco use and offered support.  I provided patient with tobacco cessation educational material printed in the patient's After Visit Summary.      Follow Up   Return in about 1 year (around 12/7/2022) for Next scheduled follow up, RECURRENT UTI/DYSURIA/DETRUSSOR  INSTABILITY CHECK PSA/REFILLS/JAYDEN?.     Patient was given instructions and counseling regarding his condition or for health maintenance advice. Please see specific information pulled into the AVS if appropriate.

## 2021-12-07 NOTE — PATIENT INSTRUCTIONS
Benign Prostatic Hyperplasia    Benign prostatic hyperplasia (BPH) is an enlarged prostate gland that is caused by the normal aging process and not by cancer. The prostate is a walnut-sized gland that is involved in the production of semen. It is located in front of the rectum and below the bladder. The bladder stores urine and the urethra is the tube that carries the urine out of the body. The prostate may get bigger as a man gets older.  An enlarged prostate can press on the urethra. This can make it harder to pass urine. The build-up of urine in the bladder can cause infection. Back pressure and infection may progress to bladder damage and kidney (renal) failure.  What are the causes?  This condition is part of a normal aging process. However, not all men develop problems from this condition. If the prostate enlarges away from the urethra, urine flow will not be blocked. If it enlarges toward the urethra and compresses it, there will be problems passing urine.  What increases the risk?  This condition is more likely to develop in men over the age of 50 years.  What are the signs or symptoms?  Symptoms of this condition include:  · Getting up often during the night to urinate.  · Needing to urinate frequently during the day.  · Difficulty starting urine flow.  · Decrease in size and strength of your urine stream.  · Leaking (dribbling) after urinating.  · Inability to pass urine. This needs immediate treatment.  · Inability to completely empty your bladder.  · Pain when you pass urine. This is more common if there is also an infection.  · Urinary tract infection (UTI).  How is this diagnosed?  This condition is diagnosed based on your medical history, a physical exam, and your symptoms. Tests will also be done, such as:  · A post-void bladder scan. This measures any amount of urine that may remain in your bladder after you finish urinating.  · A digital rectal exam. In a rectal exam, your health care provider  checks your prostate by putting a lubricated, gloved finger into your rectum to feel the back of your prostate gland. This exam detects the size of your gland and any abnormal lumps or growths.  · An exam of your urine (urinalysis).  · A prostate specific antigen (PSA) screening. This is a blood test used to screen for prostate cancer.  · An ultrasound. This test uses sound waves to electronically produce a picture of your prostate gland.  Your health care provider may refer you to a specialist in kidney and prostate diseases (urologist).  How is this treated?  Once symptoms begin, your health care provider will monitor your condition (active surveillance or watchful waiting). Treatment for this condition will depend on the severity of your condition. Treatment may include:  · Observation and yearly exams. This may be the only treatment needed if your condition and symptoms are mild.  · Medicines to relieve your symptoms, including:  ? Medicines to shrink the prostate.  ? Medicines to relax the muscle of the prostate.  · Surgery in severe cases. Surgery may include:  ? Prostatectomy. In this procedure, the prostate tissue is removed completely through an open incision or with a laparoscope or robotics.  ? Transurethral resection of the prostate (TURP). In this procedure, a tool is inserted through the opening at the tip of the penis (urethra). It is used to cut away tissue of the inner core of the prostate. The pieces are removed through the same opening of the penis. This removes the blockage.  ? Transurethral incision (TUIP). In this procedure, small cuts are made in the prostate. This lessens the prostate's pressure on the urethra.  ? Transurethral microwave thermotherapy (TUMT). This procedure uses microwaves to create heat. The heat destroys and removes a small amount of prostate tissue.  ? Transurethral needle ablation (TUNA). This procedure uses radio frequencies to destroy and remove a small amount of  prostate tissue.  ? Interstitial laser coagulation (ILC). This procedure uses a laser to destroy and remove a small amount of prostate tissue.  ? Transurethral electrovaporization (TUVP). This procedure uses electrodes to destroy and remove a small amount of prostate tissue.  ? Prostatic urethral lift. This procedure inserts an implant to push the lobes of the prostate away from the urethra.  Follow these instructions at home:  · Take over-the-counter and prescription medicines only as told by your health care provider.  · Monitor your symptoms for any changes. Contact your health care provider with any changes.  · Avoid drinking large amounts of liquid before going to bed or out in public.  · Avoid or reduce how much caffeine or alcohol you drink.  · Give yourself time when you urinate.  · Keep all follow-up visits as told by your health care provider. This is important.  Contact a health care provider if:  · You have unexplained back pain.  · Your symptoms do not get better with treatment.  · You develop side effects from the medicine you are taking.  · Your urine becomes very dark or has a bad smell.  · Your lower abdomen becomes distended and you have trouble passing your urine.  Get help right away if:  · You have a fever or chills.  · You suddenly cannot urinate.  · You feel lightheaded, or very dizzy, or you faint.  · There are large amounts of blood or clots in the urine.  · Your urinary problems become hard to manage.  · You develop moderate to severe low back or flank pain. The flank is the side of your body between the ribs and the hip.  These symptoms may represent a serious problem that is an emergency. Do not wait to see if the symptoms will go away. Get medical help right away. Call your local emergency services (911 in the [place] Do not drive yourself to the hospital.  Summary  · Benign prostatic hyperplasia (BPH) is an enlarged prostate that is caused by the normal aging process and not by  cancer.  · An enlarged prostate can press on the urethra. This can make it hard to pass urine.  · This condition is part of a normal aging process and is more likely to develop in men over the age of 50 years.  · Get help right away if you suddenly cannot urinate.  This information is not intended to replace advice given to you by your health care provider. Make sure you discuss any questions you have with your health care provider.  Document Revised: 11/12/2019 Document Reviewed: 01/22/2018  [employer] Patient Education © 2021 [employer]  Overactive Bladder, Adult    Overactive bladder refers to a condition in which a person has a sudden need to pass urine. The person may leak urine if he or she cannot get to the bathroom fast enough (urinary incontinence). A person with this condition may also wake up several times in the night to go to the bathroom.  Overactive bladder is associated with poor nerve signals between your bladder and your brain. Your bladder may get the signal to empty before it is full. You may also have very sensitive muscles that make your bladder squeeze too soon. These symptoms might interfere with daily work or social activities.  What are the causes?  This condition may be associated with or caused by:  · Urinary tract infection.  · Infection of nearby tissues, such as the prostate.  · Prostate enlargement.  · Surgery on the uterus or urethra.  · Bladder stones, inflammation, or tumors.  · Drinking too much caffeine or alcohol.  · Certain medicines, especially medicines that get rid of extra fluid in the body (diuretics).  · Muscle or nerve weakness, especially from:  ? A spinal cord injury.  ? Stroke.  ? Multiple sclerosis.  ? Parkinson's disease.  · Diabetes.  · Constipation.  What increases the risk?  You may be at greater risk for overactive bladder if you:  · Are an older adult.  · Smoke.  · Are going through menopause.  · Have prostate problems.  · Have a neurological disease, such as  stroke, dementia, Parkinson's disease, or multiple sclerosis (MS).  · Eat or drink things that irritate the bladder. These include alcohol, spicy food, and caffeine.  · Are overweight or obese.  What are the signs or symptoms?  Symptoms of this condition include:  · Sudden, strong urge to urinate.  · Leaking urine.  · Urinating 8 or more times a day.  · Waking up to urinate 2 or more times a night.  How is this diagnosed?  Your health care provider may suspect overactive bladder based on your symptoms. He or she will diagnose this condition by:  · A physical exam and medical history.  · Blood or urine tests. You might need bladder or urine tests to help determine what is causing your overactive bladder.  You might also need to see a health care provider who specializes in urinary tract problems (urologist).  How is this treated?  Treatment for overactive bladder depends on the cause of your condition and whether it is mild or severe. You can also make lifestyle changes at home. Options include:  · Bladder training. This may include:  ? Learning to control the urge to urinate by following a schedule that directs you to urinate at regular intervals (timed voiding).  ? Doing Kegel exercises to strengthen your pelvic floor muscles, which support your bladder. Toning these muscles can help you control urination, even if your bladder muscles are overactive.  · Special devices. This may include:  ? Biofeedback, which uses sensors to help you become aware of your body's signals.  ? Electrical stimulation, which uses electrodes placed inside the body (implanted) or outside the body. These electrodes send gentle pulses of electricity to strengthen the nerves or muscles that control the bladder.  ? Women may use a plastic device that fits into the vagina and supports the bladder (pessary).  · Medicines.  ? Antibiotics to treat bladder infection.  ? Antispasmodics to stop the bladder from releasing urine at the wrong  time.  ? Tricyclic antidepressants to relax bladder muscles.  ? Injections of botulinum toxin type A directly into the bladder tissue to relax bladder muscles.  · Lifestyle changes. This may include:  ? Weight loss. Talk to your health care provider about weight loss methods that would work best for you.  ? Diet changes. This may include reducing how much alcohol and caffeine you consume, or drinking fluids at different times of the day.  ? Not smoking. Do not use any products that contain nicotine or tobacco, such as cigarettes and e-cigarettes. If you need help quitting, ask your health care provider.  · Surgery.  ? A device may be implanted to help manage the nerve signals that control urination.  ? An electrode may be implanted to stimulate electrical signals in the bladder.  ? A procedure may be done to change the shape of the bladder. This is done only in very severe cases.  Follow these instructions at home:  Lifestyle  · Make any diet or lifestyle changes that are recommended by your health care provider. These may include:  ? Drinking less fluid or drinking fluids at different times of the day.  ? Cutting down on caffeine or alcohol.  ? Doing Kegel exercises.  ? Losing weight if needed.  ? Eating a healthy and balanced diet to prevent constipation. This may include:  § Eating foods that are high in fiber, such as fresh fruits and vegetables, whole grains, and beans.  § Limiting foods that are high in fat and processed sugars, such as fried and sweet foods.  General instructions  · Take over-the-counter and prescription medicines only as told by your health care provider.  · If you were prescribed an antibiotic medicine, take it as told by your health care provider. Do not stop taking the antibiotic even if you start to feel better.  · Use any implants or pessary as told by your health care provider.  · If needed, wear pads to absorb urine leakage.  · Keep a journal or log to track how much and when you  drink and when you feel the need to urinate. This will help your health care provider monitor your condition.  · Keep all follow-up visits as told by your health care provider. This is important.  Contact a health care provider if:  · You have a fever.  · Your symptoms do not get better with treatment.  · Your pain and discomfort get worse.  · You have more frequent urges to urinate.  Get help right away if:  · You are not able to control your bladder.  Summary  · Overactive bladder refers to a condition in which a person has a sudden need to pass urine.  · Several conditions may lead to an overactive bladder.  · Treatment for overactive bladder depends on the cause and severity of your condition.  · Follow your health care provider's instructions about lifestyle changes, doing Kegel exercises, keeping a journal, and taking medicines.  This information is not intended to replace advice given to you by your health care provider. Make sure you discuss any questions you have with your health care provider.  Document Revised: 04/09/2020 Document Reviewed: 01/03/2019  [employer] Patient Education © 2021 [employer]

## 2021-12-09 LAB
PSA FREE MFR SERPL: 34.2 %
PSA FREE SERPL-MCNC: 0.41 NG/ML
PSA SERPL-MCNC: 1.2 NG/ML (ref 0–4)

## 2021-12-10 LAB
BACTERIA UR CULT: NO GROWTH
BACTERIA UR CULT: NORMAL

## 2021-12-23 ENCOUNTER — OFFICE VISIT (OUTPATIENT)
Dept: CARDIOLOGY | Facility: CLINIC | Age: 66
End: 2021-12-23

## 2021-12-23 VITALS
OXYGEN SATURATION: 99 % | BODY MASS INDEX: 30.1 KG/M2 | WEIGHT: 215 LBS | DIASTOLIC BLOOD PRESSURE: 82 MMHG | TEMPERATURE: 98 F | SYSTOLIC BLOOD PRESSURE: 146 MMHG | HEART RATE: 81 BPM | HEIGHT: 71 IN

## 2021-12-23 DIAGNOSIS — R07.9 CHEST PAIN IN ADULT: ICD-10-CM

## 2021-12-23 DIAGNOSIS — Z72.0 TOBACCO USE: ICD-10-CM

## 2021-12-23 DIAGNOSIS — I10 PRIMARY HYPERTENSION: ICD-10-CM

## 2021-12-23 DIAGNOSIS — E78.2 MIXED HYPERLIPIDEMIA: ICD-10-CM

## 2021-12-23 DIAGNOSIS — I25.10 CORONARY ARTERY DISEASE INVOLVING NATIVE CORONARY ARTERY OF NATIVE HEART WITHOUT ANGINA PECTORIS: Primary | Chronic | ICD-10-CM

## 2021-12-23 DIAGNOSIS — E87.1 HYPONATREMIA: ICD-10-CM

## 2021-12-23 PROCEDURE — 99214 OFFICE O/P EST MOD 30 MIN: CPT | Performed by: INTERNAL MEDICINE

## 2021-12-23 NOTE — PROGRESS NOTES
subjective     Chief Complaint   Patient presents with   • Coronary Artery Disease     Follow up   • Shortness of Breath     x a couple of weeks     History of Present Illness    Gael is 66 years old white male who is here for cardiology follow-up.  Patient has known coronary artery disease with prior inferior wall myocardial infarction and multiple stents.  He states that he had 2 episodes of digestion and started sweating.  He denies any chest tightness or pressure sensation.  He has not had any cardiac work-up in quite some time.    He also been complaining of being more short of breath for last couple of weeks.    Blood pressure good at home according to him however blood pressure is elevated here today.  He is taking his medications regularly but he is not sure exactly which ones.  He is seeing Dr. Jane.  According to our records he is taking Cardura 8 mg daily, Toprol-XL 25 twice daily and losartan 25 mg daily for blood pressure .  He had prior history of hyponatremia so he is not taking losartan 50.  Patient is not sure what medications if any were seen by Dr. Jane.  Will get records from Dr. Jane.    Hyperlipidemia on Crestor therapy 5 mg daily.    Past Surgical History:   Procedure Laterality Date   • CARDIAC CATHETERIZATION  03/03/2014   • CARDIAC SURGERY     • CARDIOVASCULAR STRESS TEST  04/04/2013   • CATARACT EXTRACTION     • CEREBRAL ANEURYSM REPAIR     • COLONOSCOPY     • CORONARY ANGIOPLASTY WITH STENT PLACEMENT     • CYSTOSCOPY LITHOLAPAXY BLADDER STONE EXTRACTION N/A 11/29/2017    Procedure: CYSTOSCOPY LITHOLAPAXY BLADDER STONE EXTRACTION;  Surgeon: Flavio Barrios MD;  Location: [facility];  Service:    • ECHO - CONVERTED  02/02/2014   • EYE SURGERY     • INNER EAR SURGERY     • SKIN BIOPSY     • TONSILLECTOMY     • URETHRAL DILATATION N/A 6/1/2020    Procedure: CYSTOSCOPY URETHRAL DILATATION;  Surgeon: Flavio Barrios MD;  Location: [facility];  Service: Urology;  Laterality: N/A;      Family History   Problem Relation Age of Onset   • Osteoporosis Mother    • Cancer Mother         breast and lung   • Cancer Father         small cell cancer, kidney   • Heart disease Father    • Diabetes Father    • No Known Problems Sister    • Aneurysm Brother      Past Medical History:   Diagnosis Date   • Arthritis    • Bladder stones    • BPH (benign prostatic hyperplasia)    • Brain aneurysm     1987   • Brain aneurysm 1987   • Cancer ([facility])     skin ca- felix   • Claustrophobia    • COPD (chronic obstructive pulmonary disease) ([facility])    • Coronary artery disease    • Diabetes ([facility])    • Difficulty urinating    • Fracture, finger    • GERD (gastroesophageal reflux disease)    • Hematuria    • High cholesterol    • History of sepsis    • Hypertension    • MI (myocardial infarction) ([facility])     2013   • Neuropathy     FEET AND LEGS   • Prostate disease    • Stroke ([facility])     X3 last one 9/2016   TIA   • TIA (transient ischemic attack)    • Unsteady gait    • UTI (urinary tract infection)      Patient Active Problem List   Diagnosis   • Left wrist pain   • Diabetes ([facility])   • Prostate disease   • COPD (chronic obstructive pulmonary disease) ([facility])   • History of TIA (transient ischemic attack)   • Ganglion cyst of wrist   • Frequency of micturition   • Claustrophobia   • Blood in the urine   • Incomplete bladder emptying   • Coronary artery disease, inferior wall myocardial infarction 2013, bare metal stent to RCA, drug-eluting stent ×2 to LAD and balloon angioplasty of the medial trunk of the LAD diagonal 2014   • Hypertension   • Hyperlipidemia   • Tobacco use   • Gross hematuria   • Urinary retention   • Bladder stones   • Surgical aftercare, genitourinary system   • Cutaneous abscess of neck   • Acute cystitis without hematuria   • Dysuria   • Urethral stricture due to infection   • Urinary incontinence without sensory awareness   • Urinary tract infection without hematuria   • Chronic cystitis   • Chest pain in  adult   • Hyponatremia   • Stage 3a chronic kidney disease (HCC)   • Anemia       Social History     Tobacco Use   • Smoking status: Current Every Day Smoker     Packs/day: 1.00     Years: 45.00     Pack years: 45.00     Types: Cigarettes   • Smokeless tobacco: Never Used   Substance Use Topics   • Alcohol use: No   • Drug use: No       No Known Allergies    Current Outpatient Medications on File Prior to Visit   Medication Sig   • aspirin  MG tablet Take 325 mg by mouth Every 6 (Six) Hours As Needed.   • diclofenac (VOLTAREN) 75 MG EC tablet Take 75 mg by mouth 2 (Two) Times a Day.   • doxazosin (CARDURA) 8 MG tablet Take 1 tablet by mouth Daily.   • finasteride (PROSCAR) 5 MG tablet Take 1 tablet by mouth Daily.   • gabapentin (NEURONTIN) 600 MG tablet 2,400 mg Every Night.   • losartan (COZAAR) 50 MG tablet Take 0.5 tablets by mouth Daily.   • metFORMIN (GLUCOPHAGE) 500 MG tablet Take 500 mg by mouth 2 (Two) Times a Day With Meals.   • metoprolol succinate XL (TOPROL-XL) 25 MG 24 hr tablet Take 25 mg by mouth 2 (two) times a day.   • nitrofurantoin, macrocrystal-monohydrate, (MACROBID) 100 MG capsule Take 1 capsule by mouth Daily.   • nitroglycerin (NITROSTAT) 0.4 MG SL tablet Place 0.4 mg under the tongue Every 5 (Five) Minutes As Needed for Chest Pain. Take no more than 3 doses in 15 minutes.   • ondansetron (ZOFRAN) 8 MG tablet As Needed.   • oxybutynin (DITROPAN) 5 MG tablet Take 1 tablet by mouth 2 (Two) Times a Day.   • pantoprazole (PROTONIX) 40 MG EC tablet Take 40 mg by mouth 2 (two) times a day.   • rosuvastatin (CRESTOR) 5 MG tablet Take 1 tablet by mouth Daily.   • solifenacin (VESICARE) 10 MG tablet Take 1 tablet by mouth Daily.   • traMADol (ULTRAM) 50 MG tablet Take 100 mg by mouth Every Night.   • vitamin D (ERGOCALCIFEROL) 1.25 MG (41281 UT) capsule capsule Take 50,000 Units by mouth 1 (One) Time Per Week.   • zolpidem (AMBIEN) 10 MG tablet Take 10 mg by mouth At Night As Needed for Sleep.  "    No current facility-administered medications on file prior to visit.         The following portions of the patient's history were reviewed and updated as appropriate: allergies, current medications, past family history, past medical history, past social history, past surgical history and problem list.    Review of Systems   Constitutional: Negative.   HENT: Negative.  Negative for congestion.    Eyes: Negative.    Cardiovascular: Positive for dyspnea on exertion. Negative for chest pain, cyanosis, irregular heartbeat, leg swelling, near-syncope, orthopnea, palpitations, paroxysmal nocturnal dyspnea and syncope.   Respiratory: Positive for shortness of breath.    Hematologic/Lymphatic: Negative.    Musculoskeletal: Negative.    Gastrointestinal: Positive for heartburn.   Neurological: Negative.  Negative for headaches.          Objective:     /82   Pulse 81   Temp 98 °F (36.7 °C)   Ht 180.3 cm (71\")   Wt 97.5 kg (215 lb)   SpO2 99%   BMI 29.99 kg/m²   Pulmonary:      Effort: Pulmonary effort is normal.      Breath sounds: Normal breath sounds. No stridor. No wheezing. No rhonchi. No rales.   Cardiovascular:      PMI at left midclavicular line. Normal rate. Regular rhythm. Normal S1. Normal S2.      Murmurs: There is no murmur.      No gallop. No click. No rub.   Pulses:     Intact distal pulses.   Edema:     Peripheral edema absent.           Lab Review  Lab Results   Component Value Date     (L) 11/04/2021    K 4.3 11/04/2021    CL 94 (L) 11/04/2021    BUN 7 (L) 11/04/2021    CREATININE 1.33 (H) 11/04/2021    GLUCOSE 156 (H) 11/04/2021    CALCIUM 9.1 11/04/2021    ALT 8 11/04/2021    ALKPHOS 96 11/04/2021     Lab Results   Component Value Date    CKTOTAL 60 08/10/2021     Lab Results   Component Value Date    WBC 8.98 08/10/2021    HGB 9.4 (L) 08/10/2021    HCT 30.6 (L) 08/10/2021     08/10/2021     Lab Results   Component Value Date    INR 1.01 12/21/2019    INR 1.00 04/20/2018    INR " 1.02 11/13/2017     Lab Results   Component Value Date    MG 1.9 09/24/2020     Lab Results   Component Value Date    PSA 1.2 12/07/2021    TSH 2.850 09/24/2020     Lab Results   Component Value Date    BNP 27.0 09/04/2018     Lab Results   Component Value Date    CHLPL 101 03/26/2014    CHOL 96 08/10/2021    TRIG 122 08/10/2021    HDL 34 (L) 08/10/2021    VLDL 22 08/10/2021    LDLHDL 1.11 08/10/2021     Lab Results   Component Value Date    LDL 40 08/10/2021    LDL 54 03/26/2014       Procedures       I personally viewed and interpreted the patient's LAB data         Assessment:     1. Coronary artery disease involving native coronary artery of native heart without angina pectoris    2. Primary hypertension    3. Mixed hyperlipidemia    4. Tobacco use    5. Chest pain in adult    6. Hyponatremia          Plan:     Patient has known coronary artery disease with prior inferior wall myocardial infarction multiple stents.  He complains of indigestion feeling sweating and shortness of breath.  Further cardiac work-up is indicated.  Lexiscan stress test and echocardiogram was scheduled for further evaluation.    Blood pressure is elevated, patient is not sure about his medications.  He will call back with list of medications.  We will also get records from Dr. Jane.  Salt restriction was emphasized.  Patient has hyperlipidemia he will continue Crestor last LDL was 40.  Lab work scheduled.  Patient has history of hyponatremia electrolytes will be checked also.  Cessation of tobacco use was stressed.        No follow-ups on file.

## 2021-12-29 RX ORDER — ROSUVASTATIN CALCIUM 5 MG/1
TABLET, COATED ORAL
Qty: 90 TABLET | Refills: 1 | Status: SHIPPED | OUTPATIENT
Start: 2021-12-29 | End: 2022-01-01 | Stop reason: SDUPTHER

## 2022-01-01 ENCOUNTER — HOSPITAL ENCOUNTER (OUTPATIENT)
Dept: NUCLEAR MEDICINE | Facility: HOSPITAL | Age: 67
End: 2022-01-01

## 2022-01-01 ENCOUNTER — TRANSCRIBE ORDERS (OUTPATIENT)
Dept: ADMINISTRATIVE | Facility: HOSPITAL | Age: 67
End: 2022-01-01

## 2022-01-01 ENCOUNTER — HOSPITAL ENCOUNTER (OUTPATIENT)
Dept: CT IMAGING | Facility: HOSPITAL | Age: 67
Discharge: HOME | End: 2022-03-17
Admitting: NURSE PRACTITIONER

## 2022-01-01 ENCOUNTER — HOSPITAL ENCOUNTER (OUTPATIENT)
Dept: GENERAL RADIOLOGY | Facility: HOSPITAL | Age: 67
Discharge: HOME | End: 2022-11-21
Admitting: ANESTHESIOLOGY

## 2022-01-01 ENCOUNTER — HOSPITAL ENCOUNTER (OUTPATIENT)
Dept: GENERAL RADIOLOGY | Facility: HOSPITAL | Age: 67
Discharge: HOME | End: 2022-10-18

## 2022-01-01 ENCOUNTER — HOSPITAL ENCOUNTER (OUTPATIENT)
Dept: CARDIOLOGY | Facility: HOSPITAL | Age: 67
Discharge: HOME | End: 2022-02-08

## 2022-01-01 ENCOUNTER — HOSPITAL ENCOUNTER (OUTPATIENT)
Dept: NUCLEAR MEDICINE | Facility: HOSPITAL | Age: 67
Discharge: HOME | End: 2022-02-08

## 2022-01-01 ENCOUNTER — HOSPITAL ENCOUNTER (OUTPATIENT)
Dept: ULTRASOUND IMAGING | Facility: HOSPITAL | Age: 67
Discharge: HOME | End: 2022-05-31

## 2022-01-01 ENCOUNTER — TELEPHONE (OUTPATIENT)
Dept: UROLOGY | Facility: CLINIC | Age: 67
End: 2022-01-01

## 2022-01-01 ENCOUNTER — TELEPHONE (OUTPATIENT)
Dept: CARDIOLOGY | Facility: CLINIC | Age: 67
End: 2022-01-01

## 2022-01-01 ENCOUNTER — LAB (OUTPATIENT)
Dept: LAB | Facility: HOSPITAL | Age: 67
End: 2022-01-01

## 2022-01-01 ENCOUNTER — HOSPITAL ENCOUNTER (OUTPATIENT)
Dept: CT IMAGING | Facility: HOSPITAL | Age: 67
Discharge: HOME | End: 2022-05-31

## 2022-01-01 ENCOUNTER — OFFICE VISIT (OUTPATIENT)
Dept: ORTHOPEDIC SURGERY | Facility: CLINIC | Age: 67
End: 2022-01-01

## 2022-01-01 ENCOUNTER — TRANSCRIBE ORDERS (OUTPATIENT)
Dept: OTHER | Facility: OTHER | Age: 67
End: 2022-01-01

## 2022-01-01 ENCOUNTER — APPOINTMENT (OUTPATIENT)
Dept: NUCLEAR MEDICINE | Facility: HOSPITAL | Age: 67
End: 2022-01-01

## 2022-01-01 ENCOUNTER — APPOINTMENT (OUTPATIENT)
Dept: CARDIOLOGY | Facility: HOSPITAL | Age: 67
End: 2022-01-01

## 2022-01-01 ENCOUNTER — HOSPITAL ENCOUNTER (OUTPATIENT)
Dept: CARDIOLOGY | Facility: HOSPITAL | Age: 67
End: 2022-01-01

## 2022-01-01 ENCOUNTER — HOSPITAL ENCOUNTER (OUTPATIENT)
Dept: GENERAL RADIOLOGY | Facility: HOSPITAL | Age: 67
Discharge: HOME | End: 2022-03-08
Admitting: PHYSICIAN ASSISTANT

## 2022-01-01 ENCOUNTER — LAB (OUTPATIENT)
Dept: UROLOGY | Facility: CLINIC | Age: 67
End: 2022-01-01

## 2022-01-01 ENCOUNTER — OFFICE VISIT (OUTPATIENT)
Dept: UROLOGY | Facility: CLINIC | Age: 67
End: 2022-01-01

## 2022-01-01 VITALS
HEIGHT: 71 IN | HEART RATE: 66 BPM | WEIGHT: 215 LBS | BODY MASS INDEX: 30.1 KG/M2 | SYSTOLIC BLOOD PRESSURE: 131 MMHG | DIASTOLIC BLOOD PRESSURE: 72 MMHG

## 2022-01-01 VITALS — BODY MASS INDEX: 30.1 KG/M2 | HEIGHT: 71 IN | WEIGHT: 215 LBS

## 2022-01-01 DIAGNOSIS — N39.0 RECURRENT UTI: ICD-10-CM

## 2022-01-01 DIAGNOSIS — R35.0 BENIGN PROSTATIC HYPERPLASIA WITH URINARY FREQUENCY: ICD-10-CM

## 2022-01-01 DIAGNOSIS — R35.0 FREQUENCY OF MICTURITION: ICD-10-CM

## 2022-01-01 DIAGNOSIS — N32.81 OVERACTIVE BLADDER: ICD-10-CM

## 2022-01-01 DIAGNOSIS — N13.30 HYDRONEPHROSIS OF LEFT KIDNEY: ICD-10-CM

## 2022-01-01 DIAGNOSIS — R35.0 BENIGN PROSTATIC HYPERPLASIA WITH URINARY FREQUENCY: Primary | ICD-10-CM

## 2022-01-01 DIAGNOSIS — N39.0 RECURRENT UTI: Primary | ICD-10-CM

## 2022-01-01 DIAGNOSIS — M25.552 LEFT HIP PAIN: Primary | ICD-10-CM

## 2022-01-01 DIAGNOSIS — M54.9 BACK PAIN, UNSPECIFIED BACK LOCATION, UNSPECIFIED BACK PAIN LATERALITY, UNSPECIFIED CHRONICITY: ICD-10-CM

## 2022-01-01 DIAGNOSIS — N40.1 BENIGN PROSTATIC HYPERPLASIA WITH URINARY FREQUENCY: ICD-10-CM

## 2022-01-01 DIAGNOSIS — Z87.891 PERSONAL HISTORY OF TOBACCO USE, PRESENTING HAZARDS TO HEALTH: ICD-10-CM

## 2022-01-01 DIAGNOSIS — M17.0 PRIMARY OSTEOARTHRITIS OF BOTH KNEES: Primary | ICD-10-CM

## 2022-01-01 DIAGNOSIS — N17.9 ACUTE RENAL FAILURE, UNSPECIFIED ACUTE RENAL FAILURE TYPE: Primary | ICD-10-CM

## 2022-01-01 DIAGNOSIS — Z13.6 ENCOUNTER FOR ABDOMINAL AORTIC ANEURYSM (AAA) SCREENING: Primary | ICD-10-CM

## 2022-01-01 DIAGNOSIS — N30.20 CHRONIC CYSTITIS: ICD-10-CM

## 2022-01-01 DIAGNOSIS — R05.9 COUGH, UNSPECIFIED TYPE: Primary | ICD-10-CM

## 2022-01-01 DIAGNOSIS — N40.1 BENIGN PROSTATIC HYPERPLASIA WITH URINARY FREQUENCY: Primary | ICD-10-CM

## 2022-01-01 DIAGNOSIS — M54.9 BACK PAIN, UNSPECIFIED BACK LOCATION, UNSPECIFIED BACK PAIN LATERALITY, UNSPECIFIED CHRONICITY: Primary | ICD-10-CM

## 2022-01-01 DIAGNOSIS — N34.3 DYSURIA-FREQUENCY SYNDROME: ICD-10-CM

## 2022-01-01 DIAGNOSIS — Z13.6 ENCOUNTER FOR ABDOMINAL AORTIC ANEURYSM (AAA) SCREENING: ICD-10-CM

## 2022-01-01 DIAGNOSIS — M25.559 HIP PAIN: ICD-10-CM

## 2022-01-01 DIAGNOSIS — R35.0 FREQUENCY OF URINATION: Primary | ICD-10-CM

## 2022-01-01 DIAGNOSIS — R05.9 COUGH, UNSPECIFIED TYPE: ICD-10-CM

## 2022-01-01 DIAGNOSIS — N17.9 ACUTE RENAL FAILURE, UNSPECIFIED ACUTE RENAL FAILURE TYPE: ICD-10-CM

## 2022-01-01 DIAGNOSIS — N18.9 CHRONIC KIDNEY DISEASE, UNSPECIFIED CKD STAGE: Primary | ICD-10-CM

## 2022-01-01 DIAGNOSIS — Z87.891 PERSONAL HISTORY OF TOBACCO USE, PRESENTING HAZARDS TO HEALTH: Primary | ICD-10-CM

## 2022-01-01 DIAGNOSIS — I25.10 CORONARY ARTERY DISEASE INVOLVING NATIVE CORONARY ARTERY OF NATIVE HEART WITHOUT ANGINA PECTORIS: ICD-10-CM

## 2022-01-01 DIAGNOSIS — R07.9 CHEST PAIN IN ADULT: ICD-10-CM

## 2022-01-01 DIAGNOSIS — N18.9 CHRONIC KIDNEY DISEASE, UNSPECIFIED CKD STAGE: ICD-10-CM

## 2022-01-01 DIAGNOSIS — R33.9 URINARY RETENTION: ICD-10-CM

## 2022-01-01 DIAGNOSIS — M25.552 LEFT HIP PAIN: ICD-10-CM

## 2022-01-01 DIAGNOSIS — M17.0 PRIMARY OSTEOARTHRITIS OF BOTH KNEES: ICD-10-CM

## 2022-01-01 LAB
ALBUMIN SERPL-MCNC: 4.2 G/DL (ref 3.5–5.2)
ALBUMIN SERPL-MCNC: 4.3 G/DL (ref 3.5–5.2)
ALBUMIN UR-MCNC: 2.6 MG/DL
ALBUMIN UR-MCNC: 5.6 MG/DL
ALBUMIN UR-MCNC: 7.9 MG/DL
ALBUMIN/GLOB SERPL: 1.3 G/DL
ALBUMIN/GLOB SERPL: 1.4 G/DL
ALP SERPL-CCNC: 65 U/L (ref 39–117)
ALP SERPL-CCNC: 80 U/L (ref 39–117)
ALT SERPL W P-5'-P-CCNC: 6 U/L (ref 1–41)
ALT SERPL W P-5'-P-CCNC: 6 U/L (ref 1–41)
ANION GAP SERPL CALCULATED.3IONS-SCNC: 10 MMOL/L (ref 5–15)
ANION GAP SERPL CALCULATED.3IONS-SCNC: 10.3 MMOL/L (ref 5–15)
ANION GAP SERPL CALCULATED.3IONS-SCNC: 9.6 MMOL/L (ref 5–15)
AST SERPL-CCNC: 11 U/L (ref 1–40)
AST SERPL-CCNC: 13 U/L (ref 1–40)
BACTERIA SPEC AEROBE CULT: NO GROWTH
BACTERIA SPEC AEROBE CULT: NO GROWTH
BACTERIA UR QL AUTO: ABNORMAL /HPF
BH CV ECHO MEAS - AO ROOT AREA (BSA CORRECTED): 1.5
BH CV ECHO MEAS - AO ROOT AREA: 8 CM^2
BH CV ECHO MEAS - AO ROOT DIAM: 3.2 CM
BH CV ECHO MEAS - BSA(HAYCOCK): 2.2 M^2
BH CV ECHO MEAS - BSA: 2.2 M^2
BH CV ECHO MEAS - BZI_BMI: 30 KILOGRAMS/M^2
BH CV ECHO MEAS - BZI_METRIC_HEIGHT: 180.3 CM
BH CV ECHO MEAS - BZI_METRIC_WEIGHT: 97.5 KG
BH CV ECHO MEAS - EDV(CUBED): 74.1 ML
BH CV ECHO MEAS - EDV(MOD-SP4): 87.9 ML
BH CV ECHO MEAS - EDV(TEICH): 78.6 ML
BH CV ECHO MEAS - EF(CUBED): 19.9 %
BH CV ECHO MEAS - EF(MOD-SP4): 57.5 %
BH CV ECHO MEAS - EF(TEICH): 16.1 %
BH CV ECHO MEAS - ESV(CUBED): 59.3 ML
BH CV ECHO MEAS - ESV(MOD-SP4): 37.4 ML
BH CV ECHO MEAS - ESV(TEICH): 65.9 ML
BH CV ECHO MEAS - FS: 7.1 %
BH CV ECHO MEAS - IVS/LVPW: 1.4
BH CV ECHO MEAS - IVSD: 1.8 CM
BH CV ECHO MEAS - LA DIMENSION: 4.3 CM
BH CV ECHO MEAS - LA/AO: 1.3
BH CV ECHO MEAS - LV DIASTOLIC VOL/BSA (35-75): 40.4 ML/M^2
BH CV ECHO MEAS - LV MASS(C)D: 262.6 GRAMS
BH CV ECHO MEAS - LV MASS(C)DI: 120.8 GRAMS/M^2
BH CV ECHO MEAS - LV SYSTOLIC VOL/BSA (12-30): 17.2 ML/M^2
BH CV ECHO MEAS - LVIDD: 4.2 CM
BH CV ECHO MEAS - LVIDS: 3.9 CM
BH CV ECHO MEAS - LVLD AP4: 8.9 CM
BH CV ECHO MEAS - LVLS AP4: 8.2 CM
BH CV ECHO MEAS - LVOT AREA (M): 3.8 CM^2
BH CV ECHO MEAS - LVOT AREA: 3.8 CM^2
BH CV ECHO MEAS - LVOT DIAM: 2.2 CM
BH CV ECHO MEAS - LVPWD: 1.3 CM
BH CV ECHO MEAS - MV A MAX VEL: 128 CM/SEC
BH CV ECHO MEAS - MV E MAX VEL: 92.4 CM/SEC
BH CV ECHO MEAS - MV E/A: 0.72
BH CV ECHO MEAS - PA ACC TIME: 0.09 SEC
BH CV ECHO MEAS - PA PR(ACCEL): 39.4 MMHG
BH CV ECHO MEAS - SI(CUBED): 6.8 ML/M^2
BH CV ECHO MEAS - SI(MOD-SP4): 23.2 ML/M^2
BH CV ECHO MEAS - SI(TEICH): 5.8 ML/M^2
BH CV ECHO MEAS - SV(CUBED): 14.8 ML
BH CV ECHO MEAS - SV(MOD-SP4): 50.5 ML
BH CV ECHO MEAS - SV(TEICH): 12.7 ML
BH CV NUCLEAR PRIOR STUDY: 3
BH CV REST NUCLEAR ISOTOPE DOSE: 10.2 MCI
BH CV STRESS BP STAGE 1: NORMAL
BH CV STRESS BP STAGE 2: NORMAL
BH CV STRESS COMMENTS STAGE 1: NORMAL
BH CV STRESS COMMENTS STAGE 2: NORMAL
BH CV STRESS DOSE REGADENOSON STAGE 1: 0.4
BH CV STRESS DURATION MIN STAGE 1: 0
BH CV STRESS DURATION MIN STAGE 2: 4
BH CV STRESS DURATION SEC STAGE 1: 10
BH CV STRESS DURATION SEC STAGE 2: 0
BH CV STRESS HR STAGE 1: 100
BH CV STRESS HR STAGE 2: 109
BH CV STRESS NUCLEAR ISOTOPE DOSE: 32.9 MCI
BH CV STRESS PROTOCOL 1: NORMAL
BH CV STRESS RECOVERY BP: NORMAL MMHG
BH CV STRESS RECOVERY HR: 87 BPM
BH CV STRESS STAGE 1: 1
BH CV STRESS STAGE 2: 2
BILIRUB BLD-MCNC: NEGATIVE MG/DL
BILIRUB BLD-MCNC: NEGATIVE MG/DL
BILIRUB SERPL-MCNC: 0.2 MG/DL (ref 0–1.2)
BILIRUB SERPL-MCNC: 0.3 MG/DL (ref 0–1.2)
BILIRUB UR QL STRIP: NEGATIVE
BUN SERPL-MCNC: 11 MG/DL (ref 8–23)
BUN SERPL-MCNC: 8 MG/DL (ref 8–23)
BUN SERPL-MCNC: 8 MG/DL (ref 8–23)
BUN/CREAT SERPL: 5.5 (ref 7–25)
BUN/CREAT SERPL: 6.1 (ref 7–25)
BUN/CREAT SERPL: 7.9 (ref 7–25)
CALCIUM SPEC-SCNC: 8.9 MG/DL (ref 8.6–10.5)
CALCIUM SPEC-SCNC: 9.3 MG/DL (ref 8.6–10.5)
CALCIUM SPEC-SCNC: 9.4 MG/DL (ref 8.6–10.5)
CHLORIDE SERPL-SCNC: 97 MMOL/L (ref 98–107)
CHLORIDE SERPL-SCNC: 98 MMOL/L (ref 98–107)
CHLORIDE SERPL-SCNC: 99 MMOL/L (ref 98–107)
CLARITY UR: ABNORMAL
CLARITY UR: CLEAR
CLARITY UR: CLEAR
CLARITY, POC: ABNORMAL
CLARITY, POC: CLEAR
CO2 SERPL-SCNC: 22.7 MMOL/L (ref 22–29)
CO2 SERPL-SCNC: 24 MMOL/L (ref 22–29)
CO2 SERPL-SCNC: 25.4 MMOL/L (ref 22–29)
COLOR UR: YELLOW
CREAT SERPL-MCNC: 1.32 MG/DL (ref 0.76–1.27)
CREAT SERPL-MCNC: 1.39 MG/DL (ref 0.76–1.27)
CREAT SERPL-MCNC: 1.45 MG/DL (ref 0.76–1.27)
CREAT UR-MCNC: 33.1 MG/DL
CREAT UR-MCNC: 33.1 MG/DL
CREAT UR-MCNC: 45.8 MG/DL
CREAT UR-MCNC: 45.8 MG/DL
CREAT UR-MCNC: 51.6 MG/DL
CREAT UR-MCNC: 51.6 MG/DL
EGFRCR SERPLBLD CKD-EPI 2021: 55.9 ML/MIN/1.73
EGFRCR SERPLBLD CKD-EPI 2021: 59.1 ML/MIN/1.73
EXPIRATION DATE: ABNORMAL
EXPIRATION DATE: ABNORMAL
GFR SERPL CREATININE-BSD FRML MDRD: 49 ML/MIN/1.73
GLOBULIN UR ELPH-MCNC: 3.1 GM/DL
GLOBULIN UR ELPH-MCNC: 3.3 GM/DL
GLUCOSE SERPL-MCNC: 114 MG/DL (ref 65–99)
GLUCOSE SERPL-MCNC: 141 MG/DL (ref 65–99)
GLUCOSE SERPL-MCNC: 173 MG/DL (ref 65–99)
GLUCOSE UR STRIP-MCNC: NEGATIVE MG/DL
HGB UR QL STRIP.AUTO: ABNORMAL
HGB UR QL STRIP.AUTO: ABNORMAL
HGB UR QL STRIP.AUTO: NEGATIVE
HYALINE CASTS UR QL AUTO: ABNORMAL /LPF
KETONES UR QL STRIP: NEGATIVE
KETONES UR QL: NEGATIVE
KETONES UR QL: NEGATIVE
LEUKOCYTE EST, POC: ABNORMAL
LEUKOCYTE EST, POC: ABNORMAL
LEUKOCYTE ESTERASE UR QL STRIP.AUTO: ABNORMAL
LV EF NUC BP: 43 %
Lab: ABNORMAL
Lab: ABNORMAL
MAXIMAL PREDICTED HEART RATE: 154 BPM
MAXIMAL PREDICTED HEART RATE: 154 BPM
MICROALBUMIN/CREAT UR: 153.1 MG/G
MICROALBUMIN/CREAT UR: 169.2 MG/G
MICROALBUMIN/CREAT UR: 56.8 MG/G
NITRITE UR QL STRIP: NEGATIVE
NITRITE UR-MCNC: NEGATIVE MG/ML
NITRITE UR-MCNC: NEGATIVE MG/ML
PERCENT MAX PREDICTED HR: 70.78 %
PH UR STRIP.AUTO: 7 [PH] (ref 5–8)
PH UR STRIP.AUTO: 7.5 [PH] (ref 5–8)
PH UR STRIP.AUTO: 7.5 [PH] (ref 5–8)
PH UR: 6.5 [PH] (ref 5–8)
PH UR: 7 [PH] (ref 5–8)
POTASSIUM SERPL-SCNC: 4.3 MMOL/L (ref 3.5–5.2)
POTASSIUM SERPL-SCNC: 4.5 MMOL/L (ref 3.5–5.2)
POTASSIUM SERPL-SCNC: 4.5 MMOL/L (ref 3.5–5.2)
PROT ?TM UR-MCNC: 12.1 MG/DL
PROT ?TM UR-MCNC: 15 MG/DL
PROT ?TM UR-MCNC: 19.6 MG/DL
PROT SERPL-MCNC: 7.4 G/DL (ref 6–8.5)
PROT SERPL-MCNC: 7.5 G/DL (ref 6–8.5)
PROT UR QL STRIP: ABNORMAL
PROT UR STRIP-MCNC: NEGATIVE MG/DL
PROT/CREAT UR: 264.2 MG/G CREA (ref 0–200)
PROT/CREAT UR: 379.8 MG/G CREA (ref 0–200)
PROT/CREAT UR: 453.2 MG/G CREA (ref 0–200)
RBC # UR STRIP: ABNORMAL /HPF
RBC # UR STRIP: NEGATIVE /UL
RBC # UR STRIP: NEGATIVE /UL
REF LAB TEST METHOD: ABNORMAL
SODIUM SERPL-SCNC: 131 MMOL/L (ref 136–145)
SODIUM SERPL-SCNC: 131 MMOL/L (ref 136–145)
SODIUM SERPL-SCNC: 134 MMOL/L (ref 136–145)
SP GR UR STRIP: 1.01 (ref 1–1.03)
SP GR UR: 1 (ref 1–1.03)
SP GR UR: 1.01 (ref 1–1.03)
SQUAMOUS #/AREA URNS HPF: ABNORMAL /HPF
STRESS BASELINE BP: NORMAL MMHG
STRESS BASELINE HR: 63 BPM
STRESS PERCENT HR: 83 %
STRESS POST PEAK BP: NORMAL MMHG
STRESS POST PEAK HR: 109 BPM
STRESS TARGET HR: 131 BPM
STRESS TARGET HR: 131 BPM
TRANS CELLS #/AREA URNS HPF: ABNORMAL /HPF
UROBILINOGEN UR QL STRIP: ABNORMAL
UROBILINOGEN UR QL: NORMAL
UROBILINOGEN UR QL: NORMAL
WBC # UR STRIP: ABNORMAL /HPF

## 2022-01-01 PROCEDURE — 82570 ASSAY OF URINE CREATININE: CPT

## 2022-01-01 PROCEDURE — 84156 ASSAY OF PROTEIN URINE: CPT

## 2022-01-01 PROCEDURE — 93018 CV STRESS TEST I&R ONLY: CPT | Performed by: INTERNAL MEDICINE

## 2022-01-01 PROCEDURE — 93306 TTE W/DOPPLER COMPLETE: CPT

## 2022-01-01 PROCEDURE — 73560 X-RAY EXAM OF KNEE 1 OR 2: CPT | Performed by: RADIOLOGY

## 2022-01-01 PROCEDURE — 87086 URINE CULTURE/COLONY COUNT: CPT | Performed by: NURSE PRACTITIONER

## 2022-01-01 PROCEDURE — 71271 CT THORAX LUNG CANCER SCR C-: CPT | Performed by: RADIOLOGY

## 2022-01-01 PROCEDURE — 99214 OFFICE O/P EST MOD 30 MIN: CPT

## 2022-01-01 PROCEDURE — A9500 TC99M SESTAMIBI: HCPCS | Performed by: INTERNAL MEDICINE

## 2022-01-01 PROCEDURE — 71046 X-RAY EXAM CHEST 2 VIEWS: CPT

## 2022-01-01 PROCEDURE — 36415 COLL VENOUS BLD VENIPUNCTURE: CPT

## 2022-01-01 PROCEDURE — 0 TECHNETIUM SESTAMIBI: Performed by: INTERNAL MEDICINE

## 2022-01-01 PROCEDURE — 93306 TTE W/DOPPLER COMPLETE: CPT | Performed by: INTERNAL MEDICINE

## 2022-01-01 PROCEDURE — 99203 OFFICE O/P NEW LOW 30 MIN: CPT | Performed by: ORTHOPAEDIC SURGERY

## 2022-01-01 PROCEDURE — 71271 CT THORAX LUNG CANCER SCR C-: CPT

## 2022-01-01 PROCEDURE — 81001 URINALYSIS AUTO W/SCOPE: CPT

## 2022-01-01 PROCEDURE — 73700 CT LOWER EXTREMITY W/O DYE: CPT | Performed by: RADIOLOGY

## 2022-01-01 PROCEDURE — 72110 X-RAY EXAM L-2 SPINE 4/>VWS: CPT | Performed by: RADIOLOGY

## 2022-01-01 PROCEDURE — 73503 X-RAY EXAM HIP UNI 4/> VIEWS: CPT | Performed by: RADIOLOGY

## 2022-01-01 PROCEDURE — 93017 CV STRESS TEST TRACING ONLY: CPT

## 2022-01-01 PROCEDURE — 82043 UR ALBUMIN QUANTITATIVE: CPT

## 2022-01-01 PROCEDURE — 76706 US ABDL AORTA SCREEN AAA: CPT

## 2022-01-01 PROCEDURE — 73700 CT LOWER EXTREMITY W/O DYE: CPT

## 2022-01-01 PROCEDURE — 80048 BASIC METABOLIC PNL TOTAL CA: CPT

## 2022-01-01 PROCEDURE — 76706 US ABDL AORTA SCREEN AAA: CPT | Performed by: RADIOLOGY

## 2022-01-01 PROCEDURE — 80053 COMPREHEN METABOLIC PANEL: CPT

## 2022-01-01 PROCEDURE — 78452 HT MUSCLE IMAGE SPECT MULT: CPT | Performed by: INTERNAL MEDICINE

## 2022-01-01 PROCEDURE — 81003 URINALYSIS AUTO W/O SCOPE: CPT | Performed by: NURSE PRACTITIONER

## 2022-01-01 PROCEDURE — 87086 URINE CULTURE/COLONY COUNT: CPT

## 2022-01-01 PROCEDURE — 73503 X-RAY EXAM HIP UNI 4/> VIEWS: CPT

## 2022-01-01 PROCEDURE — 78452 HT MUSCLE IMAGE SPECT MULT: CPT

## 2022-01-01 PROCEDURE — 71046 X-RAY EXAM CHEST 2 VIEWS: CPT | Performed by: RADIOLOGY

## 2022-01-01 PROCEDURE — 72110 X-RAY EXAM L-2 SPINE 4/>VWS: CPT

## 2022-01-01 PROCEDURE — 25010000002 REGADENOSON 0.4 MG/5ML SOLUTION: Performed by: INTERNAL MEDICINE

## 2022-01-01 PROCEDURE — 81003 URINALYSIS AUTO W/O SCOPE: CPT

## 2022-01-01 PROCEDURE — 73560 X-RAY EXAM OF KNEE 1 OR 2: CPT

## 2022-01-01 RX ORDER — DOXAZOSIN 8 MG/1
TABLET ORAL
Qty: 30 TABLET | Refills: 3 | Status: SHIPPED | OUTPATIENT
Start: 2022-01-01 | End: 2022-01-01

## 2022-01-01 RX ORDER — PHENAZOPYRIDINE HYDROCHLORIDE 200 MG/1
200 TABLET, FILM COATED ORAL 3 TIMES DAILY PRN
Qty: 20 TABLET | Refills: 0 | Status: SHIPPED | OUTPATIENT
Start: 2022-01-01

## 2022-01-01 RX ORDER — OXYBUTYNIN CHLORIDE 5 MG/1
TABLET ORAL
Qty: 60 TABLET | Refills: 4 | Status: SHIPPED | OUTPATIENT
Start: 2022-01-01

## 2022-01-01 RX ORDER — HYDROCODONE BITARTRATE AND ACETAMINOPHEN 7.5; 325 MG/1; MG/1
TABLET ORAL
COMMUNITY
Start: 2022-01-01

## 2022-01-01 RX ORDER — DOXAZOSIN 8 MG/1
TABLET ORAL
Qty: 30 TABLET | Refills: 3 | Status: SHIPPED | OUTPATIENT
Start: 2022-01-01

## 2022-01-01 RX ORDER — ETODOLAC 600 MG/1
TABLET, EXTENDED RELEASE ORAL
COMMUNITY
Start: 2022-01-01

## 2022-01-01 RX ORDER — NITROFURANTOIN 25; 75 MG/1; MG/1
CAPSULE ORAL
Qty: 30 CAPSULE | Refills: 3 | Status: SHIPPED | OUTPATIENT
Start: 2022-01-01 | End: 2022-01-01 | Stop reason: SDUPTHER

## 2022-01-01 RX ORDER — NITROFURANTOIN 25; 75 MG/1; MG/1
100 CAPSULE ORAL DAILY
Qty: 30 CAPSULE | Refills: 3 | Status: SHIPPED | OUTPATIENT
Start: 2022-01-01

## 2022-01-01 RX ORDER — ROSUVASTATIN CALCIUM 5 MG/1
5 TABLET, COATED ORAL DAILY
Qty: 90 TABLET | Refills: 0 | Status: SHIPPED | OUTPATIENT
Start: 2022-01-01

## 2022-01-01 RX ORDER — ONDANSETRON 4 MG/1
4 TABLET, ORALLY DISINTEGRATING ORAL EVERY 12 HOURS PRN
Qty: 20 TABLET | Refills: 0 | Status: SHIPPED | OUTPATIENT
Start: 2022-01-01

## 2022-01-01 RX ORDER — ONDANSETRON 4 MG/1
TABLET, ORALLY DISINTEGRATING ORAL
COMMUNITY
Start: 2022-01-01 | End: 2022-01-01 | Stop reason: SDUPTHER

## 2022-01-01 RX ADMIN — TECHNETIUM TC 99M SESTAMIBI 1 DOSE: 1 INJECTION INTRAVENOUS at 09:00

## 2022-01-01 RX ADMIN — TECHNETIUM TC 99M SESTAMIBI 1 DOSE: 1 INJECTION INTRAVENOUS at 10:33

## 2022-01-01 RX ADMIN — REGADENOSON 0.4 MG: 0.08 INJECTION, SOLUTION INTRAVENOUS at 10:33

## 2022-02-11 NOTE — TELEPHONE ENCOUNTER
----- Message from Heber Tejada MD sent at 2/11/2022 12:01 PM EST -----  Stress test shows old heart attack but no new blockages.  Echocardiogram is okay

## 2022-02-11 NOTE — TELEPHONE ENCOUNTER
Tried contacting patient, it stated the call could not be completed at this time and to try again later.

## 2022-02-15 NOTE — TELEPHONE ENCOUNTER
Patient has changed pharmacy to [facility] on [address][phone] and needs a 90 day refill on Rosuvastatin 5mg

## 2022-03-11 NOTE — TELEPHONE ENCOUNTER
Pt called to see what his U/A came back - had him increase him Macrobid to BID until the culture is resulted and Hakan sent in some pyridium for the dysuria. Told pt to increase his fluid intake and if his symptoms get worse to go to the ER - pt voiced understanding.

## 2022-03-14 NOTE — TELEPHONE ENCOUNTER
----- Message from Griselda Cheng-Akwa, APRN sent at 3/14/2022  8:33 AM EDT -----  Regarding: NEGATIVE CX, CONTINUE MACROBID DAILY ONLY, NOT BID      0 Result Notes    Urine Culture   No growth           ----- Message -----  From: Aubrie Gunderson CMA  Sent: 3/11/2022  12:04 PM EDT  To: Griselda Cheng-Akwa, APRN

## 2022-04-25 NOTE — PROGRESS NOTES
New Patient Visit      Patient: Jovanni Richter  YOB: 1955  Date of Encounter: 04/25/2022        Chief Complaint:   Chief Complaint   Patient presents with   • Left Hip - Initial Evaluation, Pain           HPI:   Jovanni Richter, 66 y.o. male, referred by Nathan Espinal MD presents for evaluation of left hip pain of approximately 2 months duration no trauma recent or remote.  He experiences pain anterior aspect left hip describes it as severe.  He is currently controlling his pain with Norco.  He has increased pain with ambulation but does have moderate to severe pain at rest.  He has severe referral neuropathy secondary to diabetes.  He also has history of MI in the past history of TIAs in the past and has had repair of cerebral aneurysm.  Social history is positive for tobacco.        Active Problem List:  Patient Active Problem List   Diagnosis   • Left wrist pain   • Diabetes (HCC)   • Prostate disease   • COPD (chronic obstructive pulmonary disease) ([facility])   • History of TIA (transient ischemic attack)   • Ganglion cyst of wrist   • Frequency of micturition   • Claustrophobia   • Blood in the urine   • Incomplete bladder emptying   • Coronary artery disease, inferior wall myocardial infarction 2013, bare metal stent to RCA, drug-eluting stent ×2 to LAD and balloon angioplasty of the medial trunk of the LAD diagonal 2014   • Hypertension   • Hyperlipidemia   • Tobacco use   • Gross hematuria   • Urinary retention   • Bladder stones   • Surgical aftercare, genitourinary system   • Cutaneous abscess of neck   • Acute cystitis without hematuria   • Dysuria   • Urethral stricture due to infection   • Urinary incontinence without sensory awareness   • Urinary tract infection without hematuria   • Chronic cystitis   • Chest pain in adult   • Hyponatremia   • Stage 3a chronic kidney disease (HCC)   • Anemia           Past Medical History:  Past Medical History:   Diagnosis Date   • Arthritis    • Bladder  stones    • BPH (benign prostatic hyperplasia)    • Brain aneurysm     1987   • Brain aneurysm 1987   • Cancer (HCC)     skin ca- felix   • Claustrophobia    • COPD (chronic obstructive pulmonary disease) ([facility])    • Coronary artery disease    • Diabetes (HCC)    • Difficulty urinating    • Fracture, finger    • GERD (gastroesophageal reflux disease)    • Hematuria    • High cholesterol    • History of sepsis    • Hypertension    • MI (myocardial infarction) ([facility])     2013   • Neuropathy     FEET AND LEGS   • Prostate disease    • Stroke ([facility])     X3 last one 9/2016   TIA   • TIA (transient ischemic attack)    • Unsteady gait    • UTI (urinary tract infection)            Past Surgical History:  Past Surgical History:   Procedure Laterality Date   • CARDIAC CATHETERIZATION  03/03/2014   • CARDIAC SURGERY     • CARDIOVASCULAR STRESS TEST  04/04/2013   • CATARACT EXTRACTION     • CEREBRAL ANEURYSM REPAIR     • COLONOSCOPY     • CORONARY ANGIOPLASTY WITH STENT PLACEMENT     • CYSTOSCOPY LITHOLAPAXY BLADDER STONE EXTRACTION N/A 11/29/2017    Procedure: CYSTOSCOPY LITHOLAPAXY BLADDER STONE EXTRACTION;  Surgeon: Flavio Barrios MD;  Location: [facility];  Service:    • ECHO - CONVERTED  02/02/2014   • EYE SURGERY     • INNER EAR SURGERY     • SKIN BIOPSY     • TONSILLECTOMY     • URETHRAL DILATATION N/A 6/1/2020    Procedure: CYSTOSCOPY URETHRAL DILATATION;  Surgeon: Flavio Barrios MD;  Location: [facility];  Service: Urology;  Laterality: N/A;           Family History:  Family History   Problem Relation Age of Onset   • Osteoporosis Mother    • Cancer Mother         breast and lung   • Cancer Father         small cell cancer, kidney   • Heart disease Father    • Diabetes Father    • No Known Problems Sister    • Aneurysm Brother          Social History:  Social History     Socioeconomic History   • Marital status: Single   Tobacco Use   • Smoking status: Current Every Day Smoker     Packs/day: 1.00     Years:  45.00     Pack years: 45.00     Types: Cigarettes   • Smokeless tobacco: Never Used   Vaping Use   • Vaping Use: Never used   Substance and Sexual Activity   • Alcohol use: No   • Drug use: No   • Sexual activity: Defer     Birth control/protection: None     Body mass index is 29.99 kg/m².      Medications:  Current Outpatient Medications   Medication Sig Dispense Refill   • aspirin  MG tablet Take 325 mg by mouth Every 6 (Six) Hours As Needed.     • diclofenac (VOLTAREN) 75 MG EC tablet Take 75 mg by mouth 2 (Two) Times a Day.     • doxazosin (CARDURA) 8 MG tablet TAKE ONE TABLET BY MOUTH DAILY 30 tablet 3   • etodolac XL (LODINE XL) 600 MG 24 hr tablet      • finasteride (PROSCAR) 5 MG tablet Take 1 tablet by mouth Daily. 90 tablet 3   • gabapentin (NEURONTIN) 600 MG tablet 2,400 mg Every Night.     • HYDROcodone-acetaminophen (NORCO) 7.5-325 MG per tablet      • losartan (COZAAR) 50 MG tablet Take 0.5 tablets by mouth Daily. 90 tablet 1   • metFORMIN (GLUCOPHAGE) 500 MG tablet Take 500 mg by mouth 2 (Two) Times a Day With Meals.     • metoprolol succinate XL (TOPROL-XL) 25 MG 24 hr tablet Take 25 mg by mouth 2 (two) times a day.     • mupirocin (BACTROBAN) 2 % ointment      • nitrofurantoin, macrocrystal-monohydrate, (MACROBID) 100 MG capsule Take 1 capsule by mouth Daily. 90 capsule 5   • nitroglycerin (NITROSTAT) 0.4 MG SL tablet Place 0.4 mg under the tongue Every 5 (Five) Minutes As Needed for Chest Pain. Take no more than 3 doses in 15 minutes.     • ondansetron (ZOFRAN) 8 MG tablet As Needed.     • ondansetron ODT (ZOFRAN-ODT) 4 MG disintegrating tablet      • oxybutynin (DITROPAN) 5 MG tablet Take 1 tablet by mouth 2 (Two) Times a Day. 60 tablet 4   • pantoprazole (PROTONIX) 40 MG EC tablet Take 40 mg by mouth 2 (two) times a day.     • phenazopyridine (Pyridium) 200 MG tablet Take 1 tablet by mouth 3 (Three) Times a Day As Needed for Bladder Spasms. 20 tablet 0   • rosuvastatin (CRESTOR) 5 MG tablet  "Take 1 tablet by mouth Daily. 90 tablet 0   • solifenacin (VESICARE) 10 MG tablet Take 1 tablet by mouth Daily. 90 tablet 5   • traMADol (ULTRAM) 50 MG tablet Take 100 mg by mouth Every Night.     • vitamin D (ERGOCALCIFEROL) 1.25 MG (77014 UT) capsule capsule Take 50,000 Units by mouth 1 (One) Time Per Week.     • zolpidem (AMBIEN) 10 MG tablet Take 10 mg by mouth At Night As Needed for Sleep.       No current facility-administered medications for this visit.         Allergies:  No Known Allergies      Review of Systems:   Review of Systems   Constitutional: Negative.    HENT: Negative.    Eyes: Negative.    Respiratory: Negative.    Cardiovascular: Negative.    Gastrointestinal: Negative.    Endocrine: Negative.    Genitourinary: Negative.    Musculoskeletal: Positive for arthralgias.   Skin: Negative.    Allergic/Immunologic: Negative.    Hematological: Negative.    Psychiatric/Behavioral: Negative.          Physical Exam:   Physical Exam  GENERAL: 66 y.o. male, alert and oriented X 3 in no acute distress.   Visit Vitals  /72   Pulse 66   Ht 180.3 cm (71\")   Wt 97.5 kg (215 lb)   BMI 29.99 kg/m²         Musculoskeletal:   Examination left hip reveals limited mobility secondary to pain he is able to flex to 80 degrees with 20 degree arc of internal and external rotation but does have fairly severe pain at the extremes of rotation.  He has very limited sensation to his lower extremities.        Radiology/Labs:     Radiographs left hip reviewed demonstrates lucency within the superior portion of the femoral head.      CT scan obtained of left hip shows area of sclerosis with lucency within the superior portion of the femoral head.  Appearance is concerning for avascular necrosis.        Assessment & Plan:   66 y.o. male presents with severe pain left hip corresponding CT scan and radiograph showing lucency within the superior portion of the femoral head.  He has recommended consideration for total hip " arthroplasty I am doubtful that steroid injections would give him good relief.  We did discuss referral to [facility] he declined at this point as he does not feel that he can stop smoking.  Will return if he wishes to make that referral.        ICD-10-CM ICD-9-CM   1. Left hip pain  M25.552 719.45         Procedures      Cc:   Nathan Espinal MD                This document has been electronically signed by Luis Felipe Meyer MD   April 25, 2022 23:36 EDT

## 2022-09-15 NOTE — TELEPHONE ENCOUNTER
Patient had ct scan done at [facility] in [place]. Patient asking for provider to review and let him know if there is anything urological to be concerned about.

## 2022-09-15 NOTE — TELEPHONE ENCOUNTER
Routing to Sophie to review scanned in imaging and advise me on what to tell the patient.    Routing to Leia. Pt needs to be seen for Hydronephrosis and Sophie said he can see Lit or Adama.

## 2022-09-22 NOTE — PROGRESS NOTES
"Chief Complaint:    Chief Complaint   Patient presents with   • hydronephrosis       Vital Signs:   Ht 180.3 cm (71\")   Wt 97.5 kg (215 lb)   BMI 29.99 kg/m²   Body mass index is 29.99 kg/m².      HPI:  Jovanni Richter is a 67 y.o. male who presents today for follow up    History of Present Illness  Mr. Richter is to the clinic for hydronephrosis.  He has been seen previously in our office multiple times for chronic cystitis, urinary retention, dysuria, and urinary incontinence.  A CT of lumbar spine completed on 9/13/2022 revealed moderate left hydronephrosis and hydroureter with distention of the bladder.  Patient reports suprapubic pain and some left-sided flank pain today.  Bladder scan today reveals 556 mL.  Patient was only able to void a minimal amount in office today.  UA showed 3+ leukocytes today.  Patient has had multiple cultures in the past with no growth. I discussed with the patient the use of catheter placement today to help with urinary output.  However patient states that he did not want any catheter placement at this time.  I have warned the patient of significant risk if he is unable to urinate.      Past Medical History:  Past Medical History:   Diagnosis Date   • Arthritis    • Bladder stones    • BPH (benign prostatic hyperplasia)    • Brain aneurysm     1987   • Brain aneurysm 1987   • Cancer ([facility])     skin ca- felix   • Claustrophobia    • COPD (chronic obstructive pulmonary disease) ([facility])    • Coronary artery disease    • Diabetes ([facility])    • Difficulty urinating    • Fracture, finger    • GERD (gastroesophageal reflux disease)    • Hematuria    • High cholesterol    • History of sepsis    • Hypertension    • MI (myocardial infarction) ([facility])     2013   • Neuropathy     FEET AND LEGS   • Prostate disease    • Stroke ([facility])     X3 last one 9/2016   TIA   • TIA (transient ischemic attack)    • Unsteady gait    • UTI (urinary tract infection)        Current Meds:  Current Outpatient Medications "   Medication Sig Dispense Refill   • aspirin  MG tablet Take 325 mg by mouth Every 6 (Six) Hours As Needed.     • diclofenac (VOLTAREN) 75 MG EC tablet Take 75 mg by mouth 2 (Two) Times a Day.     • doxazosin (CARDURA) 8 MG tablet TAKE ONE TABLET BY MOUTH DAILY 30 tablet 3   • etodolac XL (LODINE XL) 600 MG 24 hr tablet      • finasteride (PROSCAR) 5 MG tablet Take 1 tablet by mouth Daily. 90 tablet 3   • gabapentin (NEURONTIN) 600 MG tablet 2,400 mg Every Night.     • HYDROcodone-acetaminophen (NORCO) 7.5-325 MG per tablet      • losartan (COZAAR) 50 MG tablet Take 0.5 tablets by mouth Daily. 90 tablet 1   • metFORMIN (GLUCOPHAGE) 500 MG tablet Take 500 mg by mouth 2 (Two) Times a Day With Meals.     • metoprolol succinate XL (TOPROL-XL) 25 MG 24 hr tablet Take 25 mg by mouth 2 (two) times a day.     • mupirocin (BACTROBAN) 2 % ointment      • nitrofurantoin, macrocrystal-monohydrate, (MACROBID) 100 MG capsule Take 1 capsule by mouth Daily. 90 capsule 5   • nitroglycerin (NITROSTAT) 0.4 MG SL tablet Place 0.4 mg under the tongue Every 5 (Five) Minutes As Needed for Chest Pain. Take no more than 3 doses in 15 minutes.     • ondansetron (ZOFRAN) 8 MG tablet As Needed.     • ondansetron ODT (ZOFRAN-ODT) 4 MG disintegrating tablet      • oxybutynin (DITROPAN) 5 MG tablet TAKE ONE TABLET BY MOUTH TWICE A DAY 60 tablet 4   • pantoprazole (PROTONIX) 40 MG EC tablet Take 40 mg by mouth 2 (two) times a day.     • phenazopyridine (Pyridium) 200 MG tablet Take 1 tablet by mouth 3 (Three) Times a Day As Needed for Bladder Spasms. 20 tablet 0   • rosuvastatin (CRESTOR) 5 MG tablet Take 1 tablet by mouth Daily. 90 tablet 0   • solifenacin (VESICARE) 10 MG tablet Take 1 tablet by mouth Daily. 90 tablet 5   • traMADol (ULTRAM) 50 MG tablet Take 100 mg by mouth Every Night.     • vitamin D (ERGOCALCIFEROL) 1.25 MG (26338 UT) capsule capsule Take 50,000 Units by mouth 1 (One) Time Per Week.     • zolpidem (AMBIEN) 10 MG tablet  Take 10 mg by mouth At Night As Needed for Sleep.       No current facility-administered medications for this visit.        Allergies:   No Known Allergies     Past Surgical History:  Past Surgical History:   Procedure Laterality Date   • CARDIAC CATHETERIZATION  03/03/2014   • CARDIAC SURGERY     • CARDIOVASCULAR STRESS TEST  04/04/2013   • CATARACT EXTRACTION     • CEREBRAL ANEURYSM REPAIR     • COLONOSCOPY     • CORONARY ANGIOPLASTY WITH STENT PLACEMENT     • CYSTOSCOPY LITHOLAPAXY BLADDER STONE EXTRACTION N/A 11/29/2017    Procedure: CYSTOSCOPY LITHOLAPAXY BLADDER STONE EXTRACTION;  Surgeon: Flavio Barrios MD;  Location: [facility];  Service:    • ECHO - CONVERTED  02/02/2014   • EYE SURGERY     • INNER EAR SURGERY     • SKIN BIOPSY     • TONSILLECTOMY     • URETHRAL DILATATION N/A 6/1/2020    Procedure: CYSTOSCOPY URETHRAL DILATATION;  Surgeon: Flavio Barrios MD;  Location: [facility];  Service: Urology;  Laterality: N/A;       Social History:  Social History     Socioeconomic History   • Marital status: Single   Tobacco Use   • Smoking status: Current Every Day Smoker     Packs/day: 1.00     Years: 45.00     Pack years: 45.00     Types: Cigarettes   • Smokeless tobacco: Never Used   Vaping Use   • Vaping Use: Never used   Substance and Sexual Activity   • Alcohol use: No   • Drug use: No   • Sexual activity: Defer     Birth control/protection: None       Family History:  Family History   Problem Relation Age of Onset   • Osteoporosis Mother    • Cancer Mother         breast and lung   • Cancer Father         small cell cancer, kidney   • Heart disease Father    • Diabetes Father    • No Known Problems Sister    • Aneurysm Brother        Review of Systems:  Review of Systems   Constitutional: Negative for fatigue, fever and unexpected weight change.   Respiratory: Negative for chest tightness and shortness of breath.    Cardiovascular: Negative for chest pain.   Gastrointestinal: Positive for  nausea. Negative for abdominal pain, constipation, diarrhea and vomiting.   Genitourinary: Positive for difficulty urinating, dysuria, frequency and urgency. Negative for flank pain and hematuria.   Musculoskeletal: Negative for back pain.   Skin: Negative for rash.   Psychiatric/Behavioral: Negative for confusion and suicidal ideas.       Physical Exam:  Physical Exam  Constitutional:       General: He is not in acute distress.     Appearance: Normal appearance.   HENT:      Head: Normocephalic and atraumatic.      Nose: Nose normal.      Mouth/Throat:      Mouth: Mucous membranes are moist.   Eyes:      Conjunctiva/sclera: Conjunctivae normal.   Cardiovascular:      Rate and Rhythm: Normal rate and regular rhythm.      Pulses: Normal pulses.      Heart sounds: Normal heart sounds.   Pulmonary:      Effort: Pulmonary effort is normal.      Breath sounds: Normal breath sounds.   Abdominal:      General: Bowel sounds are normal. There is distension.      Palpations: Abdomen is soft.      Comments: Slight suprapubic tenderness   Musculoskeletal:         General: Normal range of motion.      Cervical back: Normal range of motion.   Skin:     General: Skin is warm.   Neurological:      General: No focal deficit present.      Mental Status: He is alert and oriented to person, place, and time.   Psychiatric:         Mood and Affect: Mood normal.         Behavior: Behavior normal.         Thought Content: Thought content normal.         Judgment: Judgment normal.         Recent Image (CT and/or KUB):   CT Abdomen and Pelvis: Results for orders placed during the hospital encounter of 02/28/20    CT Abdomen Pelvis With & Without Contrast    Narrative  EXAM: CT ABDOMEN PELVIS W WO CONTRAST-        TECHNIQUE: Multiple axial CT images were obtained from lung bases  through pubic symphysis with and without administration of IV contrast.  Reformatted images in the coronal and/or sagittal plane(s) were  generated from the axial  data set to facilitate diagnostic accuracy  and/or surgical planning.    Radiation dose reduction techniques were utilized per ALARA protocol.  Automated exposure control was initiated through either or [employer] or  [employer] software packages by  protocol.    DOSE:    CLINICAL INFORMATION: Hematuria, renal parenchymal cause suspected;  N20.0-Calculus of kidney; R31.0-Gross hematuria; N42.9-Disorder of  prostate, unspecified    COMPARISON: Comparison: 04/20/2018    FINDINGS:    LOWER THORAX: Clear. No effusions.    ABDOMEN:    LIVER: DIFFUSE FATTY LIVER WITH NODULAR MARGINS.    GALLBLADDER: No dilation or stone identified.    PANCREAS: Unremarkable. No mass or ductal dilatation.    SPLEEN: Homogeneous. No splenomegaly.    ADRENALS: No mass.    KIDNEYS/URETERS: MILD DILATATION OF THE RIGHT URETER WITHOUT  SIGNIFICANT HYDRONEPHROSIS NOTED AND APPEARS IN PART RELATED TO PARTIAL  BLADDER OUTLET OBSTRUCTION AND URINARY RETENTION. LEFT URETER AND LEFT  KIDNEY ARE WITHIN NORMAL LIMITS. STABLE CYST RIGHT KIDNEY.    GI TRACT: Non-dilated. No definite wall thickening.    PERITONEUM: SMALL FREE FLUID LOWER PELVIS IS NOTED.    MESENTERY: Unremarkable.    LYMPH NODES: No lymphadenopathy.    VASCULATURE: STABLE ATHEROSCLEROSIS.    ABDOMINAL WALL: No focal hernia or mass.      OTHER: None.    PELVIS:    BLADDER: URINARY BLADDER WALL THICKENING IS NOTED LIKELY DUE TO  PARTIAL CHRONIC BLADDER OUTLET OBSTRUCTION. CALCIFICATIONS DESCRIBED  PREVIOUSLY IN THE RIGHT URINARY BLADDER HAVE EITHER PASSED OR BEEN  REMOVED.    REPRODUCTIVE: MARKED ENLARGEMENT OF THE PROSTATE GLAND IS AGAIN NOTED  WITH A TRANSVERSE DIAMETER OF APPROXIMATELY 5.8 CM AND MASS EFFECT ON  THE BASE OF THE URINARY BLADDER.    APPENDIX: Nondistended. No surrounding inflammation.    BONES: DEGENERATIVE CHANGES LUMBAR SPINE STABLE FROM PREVIOUS EXAM BUT  NO ACUTE BONY FINDINGS IDENTIFIED.    Impression  Impression:  1. Mild prominence of the right ureter near  level of urinary bladder  insertion and is probably related to retention and chronic partial  bladder outlet obstruction.  2. No significant hydronephrosis is noted.  3. Marked prostate enlargement with urinary bladder wall thickening  compatible with chronic partial bladder outlet obstruction.  4. Fatty liver with nodular margins can BE seen with early changes of  cirrhosis.  5. Small ascites.  6. Nonobstructing stones. No solid enhancing renal masses identified.    This report was finalized on 2/28/2020 11:24 AM by Dr. Jose White MD.     CT Stone Protocol: No results found for this or any previous visit.     KUB: Results for orders placed in visit on 02/06/20    XR Abdomen KUB    Narrative  X-RAY ABDOMEN KUB    CLINICAL INDICATION: Kidney or bladder stone; N20.0-Calculus of kidney.    COMPARISON: None available.    FINDINGS: One view of the abdomen shows moderate-to-large volume stool  in the colon.    Cannot exclude calcifications in the left kidney.    This report was finalized on 2/6/2020 12:46 PM by Dr. Chris Wilson MD.       Labs:  Brief Urine Lab Results  (Last result in the past 365 days)      Color   Clarity   Blood   Leuk Est   Nitrite   Protein   CREAT   Urine HCG        09/22/22 1148 Yellow   Clear   Negative   Large (3+)   Negative   Negative               Office Visit on 09/22/2022   Component Date Value Ref Range Status   • Color 09/22/2022 Yellow  Yellow, Straw, Dark Yellow, Hillary Final   • Clarity, UA 09/22/2022 Clear  Clear Final   • Specific Gravity  09/22/2022 1.005  1.005 - 1.030 Final   • pH, Urine 09/22/2022 6.5  5.0 - 8.0 Final   • Leukocytes 09/22/2022 Large (3+) (A) Negative Final   • Nitrite, UA 09/22/2022 Negative  Negative Final   • Protein, POC 09/22/2022 Negative  Negative mg/dL Final   • Glucose, UA 09/22/2022 Negative  Negative mg/dL Final   • Ketones, UA 09/22/2022 Negative  Negative Final   • Urobilinogen, UA 09/22/2022 Normal  Normal, 0.2 E.U./dL Final   • Bilirubin  09/22/2022 Negative  Negative Final   • Blood, UA 09/22/2022 Negative  Negative Final   • Lot Number 09/22/2022 [id number]   Final   • Expiration Date 09/22/2022 12/21/2023   Final   Lab on 09/08/2022   Component Date Value Ref Range Status   • Color, UA 09/08/2022 Yellow  Yellow, Straw Final   • Appearance, UA 09/08/2022 Clear  Clear Final   • pH, UA 09/08/2022 7.0  5.0 - 8.0 Final   • Specific Gravity, UA 09/08/2022 1.007  1.005 - 1.030 Final   • Glucose, UA 09/08/2022 Negative  Negative Final   • Ketones, UA 09/08/2022 Negative  Negative Final   • Bilirubin, UA 09/08/2022 Negative  Negative Final   • Blood, UA 09/08/2022 Negative  Negative Final   • Protein, UA 09/08/2022 Trace (A) Negative Final   • Leuk Esterase, UA 09/08/2022 Large (3+) (A) Negative Final   • Nitrite, UA 09/08/2022 Negative  Negative Final   • Urobilinogen, UA 09/08/2022 0.2 E.U./dL  0.2 - 1.0 E.U./dL Final   • Glucose 09/08/2022 141 (A) 65 - 99 mg/dL Final   • BUN 09/08/2022 8  8 - 23 mg/dL Final   • Creatinine 09/08/2022 1.32 (A) 0.76 - 1.27 mg/dL Final   • Sodium 09/08/2022 131 (A) 136 - 145 mmol/L Final   • Potassium 09/08/2022 4.5  3.5 - 5.2 mmol/L Final   • Chloride 09/08/2022 98  98 - 107 mmol/L Final   • CO2 09/08/2022 22.7  22.0 - 29.0 mmol/L Final   • Calcium 09/08/2022 8.9  8.6 - 10.5 mg/dL Final   • BUN/Creatinine Ratio 09/08/2022 6.1 (A) 7.0 - 25.0 Final   • Anion Gap 09/08/2022 10.3  5.0 - 15.0 mmol/L Final   • eGFR 09/08/2022 59.1 (A) >60.0 mL/min/1.73 Final    National Kidney Foundation and American Society of Nephrology (ASN) Task Force recommended calculation based on the Chronic Kidney Disease Epidemiology Collaboration (CKD-EPI) equation refit without adjustment for race.   • RBC, UA 09/08/2022 0-2  None Seen, 0-2 /HPF Final   • WBC, UA 09/08/2022 21-30 (A) None Seen, 0-2 /HPF Final   • Bacteria, UA 09/08/2022 None Seen  None Seen /HPF Final   • Squamous Epithelial Cells, UA 09/08/2022 0-2  None Seen, 0-2 /HPF Final   •  Hyaline Casts, UA 09/08/2022 0-2  None Seen /LPF Final   • Methodology 09/08/2022 Automated Microscopy   Final   • Protein/Creatinine Ratio, Urine 09/08/2022 453.2 (A) 0.0 - 200.0 mg/G Crea Final   • Creatinine, Urine 09/08/2022 33.1  mg/dL Final   • Total Protein, Urine 09/08/2022 15.0  mg/dL Final   • Microalbumin/Creatinine Ratio 09/08/2022 169.2  mg/g Final   • Creatinine, Urine 09/08/2022 33.1  mg/dL Final   • Microalbumin, Urine 09/08/2022 5.6  mg/dL Final        Procedure: None  Procedures     I have reviewed and agree with the above PMH, PSH, FMH, social history, medications, allergies, and labs.     Assessment/Plan:   Problem List Items Addressed This Visit        Genitourinary and Reproductive     Urinary retention      Other Visit Diagnoses     Frequency of urination    -  Primary    Relevant Orders    POC Urinalysis Dipstick, Automated (Completed)    Urine Culture - Urine, Urine, Clean Catch    Hydronephrosis of left kidney              Health Maintenance:   Health Maintenance Due   Topic Date Due   • COLORECTAL CANCER SCREENING  Never done   • Pneumococcal Vaccine 65+ (1 - PCV) Never done   • HEPATITIS C SCREENING  Never done   • ANNUAL WELLNESS VISIT  Never done   • DIABETIC FOOT EXAM  Never done   • DIABETIC EYE EXAM  Never done   • HEMOGLOBIN A1C  05/14/2018   • COVID-19 Vaccine (3 - Booster for Joie series) 01/24/2022   • ZOSTER VACCINE (2 of 2) 06/14/2022   • LIPID PANEL  08/10/2022        Smoking Counseling: Patient is a current everyday smoker.  Counseling was given however patient is not ready to quit at this time.    Urine Incontinence: Patient reports that he still experiencing urinary incontinence for which he takes oxybutynin.    Patient was given instructions and counseling regarding his condition or for health maintenance advice. Please see specific information pulled into the AVS if appropriate.    Patient Education:   Hydronephrosis/urinary retention -I discussed with the patient's  his most recent CT results completed on 9/13/2022.  I discussed with the patient that he currently has 556 mL in his bladder leading to significant distention of the bladder and possible hydronephrosis.  Advised the patient to try intermittent cathing today to help avoid urine.  I informed the patient that his best options would be to self cath at home or leave an indwelling catheter in.  Patient states that he would not like to do that at this time.  I thoroughly discussed with the patient his risk of worsening symptoms if he is unable to urinate completely.  I advised the patient to return to the clinic or go to the nearest ER if he is unable to urinate, experiences fevers, chills, hematuria, increase in pain, or altered mental status.  I will culture the patient's urine and call with results.    Visit Diagnoses:    ICD-10-CM ICD-9-CM   1. Frequency of urination  R35.0 788.41   2. Urinary retention  R33.9 788.20   3. Hydronephrosis of left kidney  N13.30 591       Meds Ordered During Visit:  No orders of the defined types were placed in this encounter.      Follow Up Appointment: December  No follow-ups on file.      This document has been electronically signed by Armand Mcdonough PA-C   September 22, 2022 16:28 EDT    Part of this note may be an electronic transcription/translation of spoken language to printed text using the Dragon Dictation System.

## 2022-11-17 NOTE — TELEPHONE ENCOUNTER
Routing to Lit to see if she wants tos end this in for the pt and I called the pt and let him know that Lit called it in.

## 2022-12-09 NOTE — TELEPHONE ENCOUNTER
The patient is asking for a refill on his Macrobid     He wants it sent to [facility]

## 2023-01-02 ENCOUNTER — PATIENT OUTREACH (OUTPATIENT)
Dept: CASE MANAGEMENT | Facility: OTHER | Age: 68
End: 2023-01-02
Payer: MEDICARE

## 2023-01-02 NOTE — OUTREACH NOTE
AMBULATORY CASE MANAGEMENT NOTE    Name and Relationship of Patient/Support Person:  -     Care Coordination    Per Patient PING alert, patient  at [facility] [place] [facility].  Epic demographics updated.            DELIA CARDOZA  Ambulatory Case Management    2023, 08:31 EST

## 2023-02-04 DIAGNOSIS — R35.0 BENIGN PROSTATIC HYPERPLASIA WITH URINARY FREQUENCY: ICD-10-CM

## 2023-02-04 DIAGNOSIS — R33.9 INCOMPLETE BLADDER EMPTYING: ICD-10-CM

## 2023-02-04 DIAGNOSIS — N40.1 BENIGN PROSTATIC HYPERPLASIA WITH URINARY FREQUENCY: ICD-10-CM

## 2023-02-04 DIAGNOSIS — R35.0 FREQUENCY OF MICTURITION: ICD-10-CM

## 2023-02-04 RX ORDER — FINASTERIDE 5 MG/1
TABLET, FILM COATED ORAL
Qty: 90 TABLET | Refills: 3 | OUTPATIENT
Start: 2023-02-04

## (undated) DEVICE — 100% SILICONE FOLEY CATHETER,5-10 ML, 2-WAY: Brand: DOVER

## (undated) DEVICE — EVAC BLDR UROVAC W ADAPT

## (undated) DEVICE — Y-TYPE TUR/BLADDER IRRIGATION SET, REGULATING CLAMP

## (undated) DEVICE — PAD GRND E/S W/CORD SPLT A/

## (undated) DEVICE — ENCORE® LATEX MICRO SIZE 8, STERILE LATEX POWDER-FREE SURGICAL GLOVE: Brand: ENCORE

## (undated) DEVICE — COR CYSTO: Brand: MEDLINE INDUSTRIES, INC.

## (undated) DEVICE — BASIN SET PACK: Brand: MEDLINE INDUSTRIES, INC.

## (undated) DEVICE — DRAINBAG,ANTI-REFLUX TOWER,L/F,2000ML,LL: Brand: MEDLINE

## (undated) DEVICE — CATHETER,FOLEY,100%SILICONE,20FR,10ML,LF: Brand: MEDLINE

## (undated) DEVICE — GOWN,REINF,POLY,ECL,PP SLV,XXL: Brand: MEDLINE

## (undated) DEVICE — NITINOL STONE RETRIEVAL BASKET: Brand: ZERO TIP

## (undated) DEVICE — SUCTION CANISTER, 1500CC, RIGID: Brand: DEROYAL

## (undated) DEVICE — FIBR LASR FLEXIVA 1000 HIPOWR 1P/U

## (undated) DEVICE — CATHETER,FOLEY,COUDE,LATEX,20FR,10ML: Brand: MEDLINE

## (undated) DEVICE — GLV SURG PREMIERPRO MIC LTX PF SZ8 BRN